# Patient Record
Sex: FEMALE | Race: WHITE | NOT HISPANIC OR LATINO | Employment: FULL TIME | URBAN - METROPOLITAN AREA
[De-identification: names, ages, dates, MRNs, and addresses within clinical notes are randomized per-mention and may not be internally consistent; named-entity substitution may affect disease eponyms.]

---

## 2017-03-10 DIAGNOSIS — N92.6 IRREGULAR MENSES: ICD-10-CM

## 2017-03-10 RX ORDER — DESOGESTREL AND ETHINYL ESTRADIOL 0.15-0.03
KIT ORAL
Qty: 28 TABLET | Refills: 3 | Status: SHIPPED | OUTPATIENT
Start: 2017-03-10 | End: 2018-01-26

## 2017-03-28 ENCOUNTER — PATIENT MESSAGE (OUTPATIENT)
Dept: INTERNAL MEDICINE | Facility: CLINIC | Age: 27
End: 2017-03-28

## 2017-03-28 ENCOUNTER — OFFICE VISIT (OUTPATIENT)
Dept: INTERNAL MEDICINE | Facility: CLINIC | Age: 27
End: 2017-03-28
Payer: COMMERCIAL

## 2017-03-28 VITALS
HEIGHT: 64 IN | DIASTOLIC BLOOD PRESSURE: 74 MMHG | SYSTOLIC BLOOD PRESSURE: 126 MMHG | WEIGHT: 143.94 LBS | BODY MASS INDEX: 24.57 KG/M2

## 2017-03-28 DIAGNOSIS — R30.0 DYSURIA: ICD-10-CM

## 2017-03-28 DIAGNOSIS — N30.00 ACUTE CYSTITIS WITHOUT HEMATURIA: Primary | ICD-10-CM

## 2017-03-28 LAB
BACTERIA #/AREA URNS AUTO: NORMAL /HPF
BILIRUB UR QL STRIP: NEGATIVE
CAOX CRY UR QL COMP ASSIST: NORMAL
CLARITY UR REFRACT.AUTO: ABNORMAL
COLOR UR AUTO: ABNORMAL
GLUCOSE UR QL STRIP: NEGATIVE
HGB UR QL STRIP: NEGATIVE
KETONES UR QL STRIP: NEGATIVE
LEUKOCYTE ESTERASE UR QL STRIP: ABNORMAL
MICROSCOPIC COMMENT: NORMAL
NITRITE UR QL STRIP: POSITIVE
PH UR STRIP: 5 [PH] (ref 5–8)
PROT UR QL STRIP: NEGATIVE
RBC #/AREA URNS AUTO: 0 /HPF (ref 0–4)
SP GR UR STRIP: >1.03 (ref 1–1.03)
SQUAMOUS #/AREA URNS AUTO: 1 /HPF
URN SPEC COLLECT METH UR: ABNORMAL
UROBILINOGEN UR STRIP-ACNC: ABNORMAL EU/DL
WBC #/AREA URNS AUTO: 1 /HPF (ref 0–5)

## 2017-03-28 PROCEDURE — 99213 OFFICE O/P EST LOW 20 MIN: CPT | Mod: S$GLB,,, | Performed by: STUDENT IN AN ORGANIZED HEALTH CARE EDUCATION/TRAINING PROGRAM

## 2017-03-28 PROCEDURE — 87086 URINE CULTURE/COLONY COUNT: CPT

## 2017-03-28 PROCEDURE — 87077 CULTURE AEROBIC IDENTIFY: CPT

## 2017-03-28 PROCEDURE — 87186 SC STD MICRODIL/AGAR DIL: CPT

## 2017-03-28 PROCEDURE — 87088 URINE BACTERIA CULTURE: CPT

## 2017-03-28 PROCEDURE — 99999 PR PBB SHADOW E&M-EST. PATIENT-LVL III: CPT | Mod: PBBFAC,,, | Performed by: STUDENT IN AN ORGANIZED HEALTH CARE EDUCATION/TRAINING PROGRAM

## 2017-03-28 PROCEDURE — 1160F RVW MEDS BY RX/DR IN RCRD: CPT | Mod: S$GLB,,, | Performed by: STUDENT IN AN ORGANIZED HEALTH CARE EDUCATION/TRAINING PROGRAM

## 2017-03-28 PROCEDURE — 81001 URINALYSIS AUTO W/SCOPE: CPT

## 2017-03-28 RX ORDER — NITROFURANTOIN (MACROCRYSTALS) 100 MG/1
100 CAPSULE ORAL EVERY 12 HOURS
Qty: 10 CAPSULE | Refills: 0 | Status: SHIPPED | OUTPATIENT
Start: 2017-03-28 | End: 2017-03-29 | Stop reason: ALTCHOICE

## 2017-03-28 NOTE — PROGRESS NOTES
Subjective:       Patient ID: Chelsie Almonte is a 27 y.o. female.    Chief Complaint: Urinary Tract Infection (x1 day)    Urinary Tract Infection    Associated symptoms include frequency. Pertinent negatives include no flank pain, hematuria, nausea or rash.      Patient comes to clinic for evaluation of possible UTI. She has been experiencing urinary discomfort, along with increased frequency for the past day. She states that this is her third UTI this year, and symptoms are similar to previous episodes. The first UTI she took Cipro, second UTI she took Bactrim which she states did not work well as she still had residual symptoms. None of the previous UTI's are documented in our chart as they were done elsewhere, no previous urine cultures on file. She has been taking Azo since yesterday. She denies any fevers, chills, or hematuria. She believes that recurrent UTI's are secondary to lack of urination following sexual intercourse and dehydration.     Review of Systems   Constitutional: Negative for activity change, appetite change, fatigue, fever and unexpected weight change.   HENT: Negative for congestion, hearing loss, rhinorrhea and sore throat.    Eyes: Negative for discharge and visual disturbance.   Respiratory: Negative for apnea, cough, choking and shortness of breath.    Cardiovascular: Negative for chest pain, palpitations and leg swelling.   Gastrointestinal: Negative for abdominal distention, abdominal pain, blood in stool, diarrhea and nausea.   Endocrine: Negative for cold intolerance, heat intolerance, polydipsia, polyphagia and polyuria.   Genitourinary: Positive for dysuria and frequency. Negative for difficulty urinating, flank pain and hematuria.   Musculoskeletal: Negative for arthralgias, back pain, joint swelling and myalgias.   Skin: Negative for color change, pallor and rash.   Allergic/Immunologic: Negative for environmental allergies and food allergies.   Neurological: Negative for  dizziness and headaches.   Hematological: Negative for adenopathy. Does not bruise/bleed easily.   Psychiatric/Behavioral: Negative for behavioral problems, decreased concentration, dysphoric mood and sleep disturbance. The patient is not nervous/anxious.        Objective:      Physical Exam   Constitutional: She is oriented to person, place, and time. She appears well-developed and well-nourished. No distress.   HENT:   Head: Normocephalic and atraumatic.   Right Ear: External ear normal.   Left Ear: External ear normal.   Mouth/Throat: No oropharyngeal exudate.   Eyes: Conjunctivae and EOM are normal. Pupils are equal, round, and reactive to light. Right eye exhibits no discharge. Left eye exhibits no discharge. No scleral icterus.   Neck: Normal range of motion. Neck supple. No JVD present. No tracheal deviation present. No thyromegaly present.   Cardiovascular: Normal rate and regular rhythm.  Exam reveals no gallop and no friction rub.    No murmur heard.  Pulmonary/Chest: Effort normal and breath sounds normal. No respiratory distress. She has no wheezes. She has no rales.   Abdominal: Soft. Bowel sounds are normal. She exhibits no distension and no mass. There is no tenderness. There is no rebound and no guarding.   Musculoskeletal: Normal range of motion. She exhibits no edema or tenderness.   Lymphadenopathy:     She has no cervical adenopathy.   Neurological: She is alert and oriented to person, place, and time.   Skin: Skin is warm and dry. No rash noted. She is not diaphoretic. No erythema. No pallor.   Psychiatric: She has a normal mood and affect. Her behavior is normal. Judgment and thought content normal.       Assessment:       1. Acute cystitis without hematuria    2. Dysuria        Plan:       Chelsie CORDOBA was seen today for urinary tract infection.    Diagnoses and all orders for this visit:    Dysuria  -     URINALYSIS  -     Urine culture  -     POCT urinalysis, dipstick  - Nitrofurantoin 500 BID  for 5 days, will follow up with patient regarding results of culture and appropriate abx

## 2017-03-28 NOTE — MR AVS SNAPSHOT
[facility]  [address]  [facility] [address]  Phone: [phone]  Fax: [phone]                  Chelsie Almonte   3/28/2017 3:30 PM   Office Visit    Description:  Female : 1990   Provider:  Philippe Peterson MD   Department:  [facility]           Reason for Visit     Urinary Tract Infection           Diagnoses this Visit        Comments    Dysuria    -  Primary            To Do List           Goals (5 Years of Data)     None      Follow-Up and Disposition     Return if symptoms worsen or fail to improve.    Follow-up and Disposition History       These Medications        Disp Refills Start End    nitrofurantoin (MACRODANTIN) 100 MG capsule 10 capsule 0 3/28/2017 2017    Take 1 capsule (100 mg total) by mouth every 12 (twelve) hours. - Oral    Pharmacy: \[facility]\"" [facility]  [facility] [address] #: [phone]         [facility] On Call     [facility] On Call Nurse Care Line -  Assistance  Registered nurses in the [facility] On Call Center provide clinical advisement, health education, appointment booking, and other advisory services.  Call for this free service at [phone].             Medications           Message regarding Medications     Verify the changes and/or additions to your medication regime listed below are the same as discussed with your clinician today.  If any of these changes or additions are incorrect, please notify your healthcare provider.        START taking these NEW medications        Refills    nitrofurantoin (MACRODANTIN) 100 MG capsule 0    Sig: Take 1 capsule (100 mg total) by mouth every 12 (twelve) hours.    Class: Normal    Route: Oral           Verify that the below list of medications is an accurate representation of the medications you are currently taking.  If none reported, the list may be blank. If incorrect, please contact your healthcare provider. Carry this list with you in case  "of emergency.           Current Medications     adapalene (DIFFERIN) 0.1 % gel     fluticasone (FLONASE) 50 mcg/actuation nasal spray 1 spray by Each Nare route once daily.    GILDESS 1-20 mg-mcg per tablet Take 1 tablet by mouth once daily.    minocycline 90 mg Tb24     nitrofurantoin (MACRODANTIN) 100 MG capsule Take 1 capsule (100 mg total) by mouth every 12 (twelve) hours.    RECLIPSEN, 28, 0.15-0.03 mg per tablet TAKE ONE TABLET BY MOUTH ONCE DAILY           Clinical Reference Information           Your Vitals Were     BP Height Weight Last Period BMI    126/74 (BP Location: Right arm, Patient Position: Sitting, BP Method: Manual) 5' 4" (1.626 m) 65.3 kg (143 lb 15.4 oz) 03/01/2017 24.71 kg/m2      Blood Pressure          Most Recent Value    BP  126/74      Allergies as of 3/28/2017     Penicillins      Immunizations Administered on Date of Encounter - 3/28/2017     None      Orders Placed During Today's Visit      Normal Orders This Visit    POCT urinalysis, dipstick or tablet reag     URINALYSIS     Urine culture       Language Assistance Services     ATTENTION: Language assistance services are available, free of charge. Please call [phone].      ATENCIÓN: Si habla oxana, tiene a wright disposición servicios gratuitos de asistencia lingüística. Llame al [phone].     MARLENY Ý: N?u b?n nói Ti?ng Vi?t, có các d?ch v? h? tr? ngôn ng? mi?n phí dành cho b?n. G?i s? [phone].         [facility] complies with applicable Federal civil rights laws and does not discriminate on the basis of race, color, national origin, age, disability, or sex.        "

## 2017-03-29 RX ORDER — NITROFURANTOIN 25; 75 MG/1; MG/1
100 CAPSULE ORAL 2 TIMES DAILY
Qty: 10 CAPSULE | Refills: 0 | Status: SHIPPED | OUTPATIENT
Start: 2017-03-29 | End: 2017-04-03

## 2017-03-29 NOTE — PROGRESS NOTES
Preceptor note    Patient's history and physical discussed, please refer to resident physician's note for specific details. Pt seen with resident physician. Medical record reviewed. UA positive. I agree with resident's assessment and plan.

## 2017-03-31 LAB — BACTERIA UR CULT: NORMAL

## 2017-05-26 ENCOUNTER — OFFICE VISIT (OUTPATIENT)
Dept: OBSTETRICS AND GYNECOLOGY | Facility: CLINIC | Age: 27
End: 2017-05-26
Payer: COMMERCIAL

## 2017-05-26 VITALS
BODY MASS INDEX: 24.46 KG/M2 | DIASTOLIC BLOOD PRESSURE: 74 MMHG | SYSTOLIC BLOOD PRESSURE: 108 MMHG | WEIGHT: 143.31 LBS | HEIGHT: 64 IN

## 2017-05-26 DIAGNOSIS — Z01.419 ENCOUNTER FOR GYNECOLOGICAL EXAMINATION WITHOUT ABNORMAL FINDING: Primary | ICD-10-CM

## 2017-05-26 DIAGNOSIS — N92.6 IRREGULAR MENSES: ICD-10-CM

## 2017-05-26 LAB
C TRACH DNA SPEC QL NAA+PROBE: NOT DETECTED
N GONORRHOEA DNA SPEC QL NAA+PROBE: NOT DETECTED

## 2017-05-26 PROCEDURE — 99999 PR PBB SHADOW E&M-EST. PATIENT-LVL III: CPT | Mod: PBBFAC,,, | Performed by: OBSTETRICS & GYNECOLOGY

## 2017-05-26 PROCEDURE — 99395 PREV VISIT EST AGE 18-39: CPT | Mod: S$GLB,,, | Performed by: OBSTETRICS & GYNECOLOGY

## 2017-05-26 PROCEDURE — 87591 N.GONORRHOEAE DNA AMP PROB: CPT

## 2017-05-26 RX ORDER — DESOGESTREL AND ETHINYL ESTRADIOL 0.15-0.03
1 KIT ORAL DAILY
Qty: 28 TABLET | Refills: 11 | Status: SHIPPED | OUTPATIENT
Start: 2017-05-26 | End: 2018-02-19 | Stop reason: SDUPTHER

## 2017-05-26 NOTE — PROGRESS NOTES
"CC: Well woman exam    Chelsie Almonte is a 27 y.o. female  presents for a well woman exam.  She has no issues, problems, or complaints.  Wants refill on OCPs  Agrees to STD testing    Past Medical History:   Diagnosis Date    Abnormal Pap smear of cervix     Heart murmur     evaluated as a child, told not to be a problem    Scoliosis     didn' t need brace       Past Surgical History:   Procedure Laterality Date    breast augmentation      NOSE SURGERY      correct fracture of nose    TONSILLECTOMY, ADENOIDECTOMY         OB History    Para Term  AB Living   0        SAB TAB Ectopic Multiple Live Births                    Family History   Problem Relation Age of Onset    Breast cancer Neg Hx     Colon cancer Neg Hx     Ovarian cancer Neg Hx        Social History   Substance Use Topics    Smoking status: Never Smoker    Smokeless tobacco: Never Used    Alcohol use 0.0 oz/week      Comment: once a week       /74   Ht 5' 4" (1.626 m)   Wt 65 kg (143 lb 4.8 oz)   LMP 2017 (Exact Date)   BMI 24.60 kg/m²     ROS:  GENERAL: Denies weight gain or weight loss. Feeling well overall.   SKIN: Denies rash or lesions.   HEAD: Denies head injury or headache.   NODES: Denies enlarged lymph nodes.   CHEST: Denies chest pain or shortness of breath.   CARDIOVASCULAR: Denies palpitations or left sided chest pain.   ABDOMEN: No abdominal pain, constipation, diarrhea, nausea, vomiting or rectal bleeding.   URINARY: No frequency, dysuria, hematuria, or burning on urination.  REPRODUCTIVE: See HPI.   BREASTS: The patient performs breast self-examination and denies pain, lumps, or nipple discharge.   HEMATOLOGIC: No easy bruisability or excessive bleeding.  MUSCULOSKELETAL: Denies joint pain or swelling.   NEUROLOGIC: Denies syncope or weakness.   PSYCHIATRIC: Denies depression, anxiety or mood swings.    Physical Exam:    APPEARANCE: Well nourished, well developed, in no acute " distress.  AFFECT: WNL, alert and oriented x 3  SKIN: No acne or hirsutism  NECK: Neck symmetric without masses or thyromegaly  NODES: No inguinal, cervical, axillary, or femoral lymph node enlargement  CHEST: Good respiratory effect  ABDOMEN: Soft.  No tenderness or masses.  No hepatosplenomegaly.  No hernias.  BREASTS: Symmetrical, no skin changes or visible lesions.  No palpable masses, nipple discharge bilaterally.  PELVIC: Normal external genitalia without lesions.  Normal hair distribution.  Adequate perineal body, normal urethral meatus.  Vagina moist and well rugated without lesions or discharge.  Cervix pink, without lesions, discharge or tenderness.  No significant cystocele or rectocele.  Bimanual exam shows uterus to be normal size, regular, mobile and nontender.  Adnexa without masses or tenderness.    EXTREMITIES: No edema.    ASSESSMENT AND PLAN  1. Encounter for gynecological examination without abnormal finding  desogestrel-ethinyl estradiol (APRI) 0.15-0.03 mg per tablet   2. Irregular menses  desogestrel-ethinyl estradiol (APRI) 0.15-0.03 mg per tablet    C. trachomatis/N. gonorrhoeae by AMP DNA Cervix       Patient was counseled today on A.C.S. Pap guidelines and recommendations for yearly pelvic exams, mammograms and monthly self breast exams; to see her PCP for other health maintenance.     F/U PRN

## 2017-06-02 ENCOUNTER — TELEPHONE (OUTPATIENT)
Dept: INTERNAL MEDICINE | Facility: CLINIC | Age: 27
End: 2017-06-02

## 2017-06-02 ENCOUNTER — PATIENT MESSAGE (OUTPATIENT)
Dept: INTERNAL MEDICINE | Facility: CLINIC | Age: 27
End: 2017-06-02

## 2017-06-02 NOTE — TELEPHONE ENCOUNTER
----- Message from Hortensia Hodgson sent at 6/2/2017 12:00 PM CDT -----  Contact: Self/[phone]  Pt said that she is calling in regards to needing to schedule a Tdap injection. Please call and advise            Thank you

## 2017-11-15 ENCOUNTER — OFFICE VISIT (OUTPATIENT)
Dept: URGENT CARE | Facility: CLINIC | Age: 27
End: 2017-11-15
Payer: COMMERCIAL

## 2017-11-15 VITALS
HEART RATE: 103 BPM | BODY MASS INDEX: 23.9 KG/M2 | OXYGEN SATURATION: 98 % | TEMPERATURE: 100 F | DIASTOLIC BLOOD PRESSURE: 85 MMHG | SYSTOLIC BLOOD PRESSURE: 147 MMHG | HEIGHT: 64 IN | WEIGHT: 140 LBS

## 2017-11-15 DIAGNOSIS — J06.9 UPPER RESPIRATORY TRACT INFECTION, UNSPECIFIED TYPE: Primary | ICD-10-CM

## 2017-11-15 DIAGNOSIS — R52 BODY ACHES: ICD-10-CM

## 2017-11-15 PROCEDURE — 99213 OFFICE O/P EST LOW 20 MIN: CPT | Mod: 25,S$GLB,, | Performed by: FAMILY MEDICINE

## 2017-11-15 PROCEDURE — 96372 THER/PROPH/DIAG INJ SC/IM: CPT | Mod: S$GLB,,, | Performed by: FAMILY MEDICINE

## 2017-11-15 RX ORDER — DEXAMETHASONE SODIUM PHOSPHATE 100 MG/10ML
5 INJECTION INTRAMUSCULAR; INTRAVENOUS ONCE
Status: COMPLETED | OUTPATIENT
Start: 2017-11-15 | End: 2017-11-15

## 2017-11-15 RX ORDER — BENZONATATE 200 MG/1
200 CAPSULE ORAL 3 TIMES DAILY PRN
Qty: 30 CAPSULE | Refills: 0 | Status: SHIPPED | OUTPATIENT
Start: 2017-11-15 | End: 2017-11-25

## 2017-11-15 RX ORDER — IBUPROFEN 200 MG
800 TABLET ORAL
Status: COMPLETED | OUTPATIENT
Start: 2017-11-15 | End: 2017-11-15

## 2017-11-15 RX ADMIN — Medication 800 MG: at 06:11

## 2017-11-15 RX ADMIN — DEXAMETHASONE SODIUM PHOSPHATE 5 MG: 100 INJECTION INTRAMUSCULAR; INTRAVENOUS at 06:11

## 2017-11-15 NOTE — PROGRESS NOTES
"Subjective:       Patient ID: Chelsie Almonte is a 27 y.o. female.    Vitals:  height is 5' 4" (1.626 m) and weight is 63.5 kg (140 lb). Her temperature is 99.5 °F (37.5 °C). Her blood pressure is 147/85 (abnormal) and her pulse is 103. Her oxygen saturation is 98%.     Chief Complaint: URI    Throat pain and cough      URI    This is a new problem. The current episode started yesterday. The problem has been gradually worsening. Associated symptoms include congestion, coughing, headaches and a sore throat. Pertinent negatives include no abdominal pain, chest pain, ear pain, nausea or wheezing. She has tried acetaminophen (tylenol cold, dayquil, flonase) for the symptoms. The treatment provided no relief.     Review of Systems   Constitution: Positive for chills, fever and malaise/fatigue.   HENT: Positive for congestion, hoarse voice and sore throat. Negative for ear pain.    Eyes: Negative for discharge and redness.   Cardiovascular: Negative for chest pain, dyspnea on exertion and leg swelling.   Respiratory: Positive for cough. Negative for shortness of breath, sputum production and wheezing.    Musculoskeletal: Negative for myalgias.   Gastrointestinal: Negative for abdominal pain and nausea.   Neurological: Positive for headaches.   All other systems reviewed and are negative.      Objective:      Physical Exam   Constitutional: She is oriented to person, place, and time. She appears well-developed and well-nourished.  Non-toxic appearance. She appears ill.   HENT:   Head: Normocephalic and atraumatic.   Right Ear: External ear normal.   Left Ear: External ear normal.   Nose: Mucosal edema, rhinorrhea and sinus tenderness present.   Mouth/Throat: Posterior oropharyngeal erythema present.   Eyes: Conjunctivae and EOM are normal. Pupils are equal, round, and reactive to light.   Neck: Normal range of motion.   Cardiovascular: Normal rate, regular rhythm and normal heart sounds.    Pulmonary/Chest: Effort " normal and breath sounds normal. She has no wheezes. She has no rales.   Abdominal: Soft.   Neurological: She is alert and oriented to person, place, and time.       Assessment:       1. Upper respiratory tract infection, unspecified type    2. Body aches        Plan:         Upper respiratory tract infection, unspecified type  -     dexamethasone injection 5 mg; Inject 0.5 mLs (5 mg total) into the muscle once.    Body aches  -     POCT Influenza A/B  -     ibuprofen tablet 800 mg; Take 4 tablets (800 mg total) by mouth one time.    Other orders  -     benzonatate (TESSALON) 200 MG capsule; Take 1 capsule (200 mg total) by mouth 3 (three) times daily as needed for Cough.  Dispense: 30 capsule; Refill: 0      Patient Instructions     Viral Upper Respiratory Illness (Adult)  You have a viral upper respiratory illness (URI), which is another term for the common cold. This illness is contagious during the first few days. It is spread through the air by coughing and sneezing. It may also be spread by direct contact (touching the sick person and then touching your own eyes, nose, or mouth). Frequent handwashing will decrease risk of spread. Most viral illnesses go away within 7 to 10 days with rest and simple home remedies. Sometimes the illness may last for several weeks. Antibiotics will not kill a virus, and they are generally not prescribed for this condition.    Home care  · If symptoms are severe, rest at home for the first 2 to 3 days. When you resume activity, don't let yourself get too tired.  · Avoid being exposed to cigarette smoke (yours or others).  · You may use acetaminophen or ibuprofen to control pain and fever, unless another medicine was prescribed. (Note: If you have chronic liver or kidney disease, have ever had a stomach ulcer or gastrointestinal bleeding, or are taking blood-thinning medicines, talk with your healthcare provider before using these medicines.) Aspirin should never be given to  anyone under 18 years of age who is ill with a viral infection or fever. It may cause severe liver or brain damage.  · Your appetite may be poor, so a light diet is fine. Avoid dehydration by drinking 6 to 8 glasses of fluids per day (water, soft drinks, juices, tea, or soup). Extra fluids will help loosen secretions in the nose and lungs.  · Over-the-counter cold medicines will not shorten the length of time youre sick, but they may be helpful for the following symptoms: cough, sore throat, and nasal and sinus congestion. (Note: Do not use decongestants if you have high blood pressure.)  Follow-up care  Follow up with your healthcare provider, or as advised.  When to seek medical advice  Call your healthcare provider right away if any of these occur:  · Cough with lots of colored sputum (mucus)  · Severe headache; face, neck, or ear pain  · Difficulty swallowing due to throat pain  · Fever of 100.4°F (38°C)  Call 911, or get immediate medical care  Call emergency services right away if any of these occur:  · Chest pain, shortness of breath, wheezing, or difficulty breathing  · Coughing up blood  · Inability to swallow due to throat pain  Date Last Reviewed: 9/13/2015  © 5131-0026 [employer]. [address] [place] [address], [place] [address]. All rights reserved. This information is not intended as a substitute for professional medical care. Always follow your healthcare professional's instructions.

## 2017-11-15 NOTE — LETTER
November 15, 2017      [facility] [address] [facility] [facility] [place] [address]  Phone: [phone]  Fax: [phone]       Patient: Chelsie Almonte   YOB: 1990  Date of Visit: 11/15/2017    To Whom It May Concern:    Herbert Almonte  was at [facility] on 11/15/2017. She may return to work/school on 11/17/2017 with no restrictions. If you have any questions or concerns, or if I can be of further assistance, please do not hesitate to contact me.    Sincerely,        Eliseo Maradiaga MD

## 2017-11-16 NOTE — PATIENT INSTRUCTIONS
Viral Upper Respiratory Illness (Adult)  You have a viral upper respiratory illness (URI), which is another term for the common cold. This illness is contagious during the first few days. It is spread through the air by coughing and sneezing. It may also be spread by direct contact (touching the sick person and then touching your own eyes, nose, or mouth). Frequent handwashing will decrease risk of spread. Most viral illnesses go away within 7 to 10 days with rest and simple home remedies. Sometimes the illness may last for several weeks. Antibiotics will not kill a virus, and they are generally not prescribed for this condition.    Home care  · If symptoms are severe, rest at home for the first 2 to 3 days. When you resume activity, don't let yourself get too tired.  · Avoid being exposed to cigarette smoke (yours or others).  · You may use acetaminophen or ibuprofen to control pain and fever, unless another medicine was prescribed. (Note: If you have chronic liver or kidney disease, have ever had a stomach ulcer or gastrointestinal bleeding, or are taking blood-thinning medicines, talk with your healthcare provider before using these medicines.) Aspirin should never be given to anyone under 18 years of age who is ill with a viral infection or fever. It may cause severe liver or brain damage.  · Your appetite may be poor, so a light diet is fine. Avoid dehydration by drinking 6 to 8 glasses of fluids per day (water, soft drinks, juices, tea, or soup). Extra fluids will help loosen secretions in the nose and lungs.  · Over-the-counter cold medicines will not shorten the length of time youre sick, but they may be helpful for the following symptoms: cough, sore throat, and nasal and sinus congestion. (Note: Do not use decongestants if you have high blood pressure.)  Follow-up care  Follow up with your healthcare provider, or as advised.  When to seek medical advice  Call your healthcare provider right away if any  of these occur:  · Cough with lots of colored sputum (mucus)  · Severe headache; face, neck, or ear pain  · Difficulty swallowing due to throat pain  · Fever of 100.4°F (38°C)  Call 911, or get immediate medical care  Call emergency services right away if any of these occur:  · Chest pain, shortness of breath, wheezing, or difficulty breathing  · Coughing up blood  · Inability to swallow due to throat pain  Date Last Reviewed: 9/13/2015  © 0748-9860 [employer]. [address] [place] [address]. All rights reserved. This information is not intended as a substitute for professional medical care. Always follow your healthcare professional's instructions.

## 2017-11-20 ENCOUNTER — OFFICE VISIT (OUTPATIENT)
Dept: URGENT CARE | Facility: CLINIC | Age: 27
End: 2017-11-20
Payer: COMMERCIAL

## 2017-11-20 VITALS
SYSTOLIC BLOOD PRESSURE: 143 MMHG | TEMPERATURE: 98 F | DIASTOLIC BLOOD PRESSURE: 83 MMHG | OXYGEN SATURATION: 98 % | HEART RATE: 104 BPM | WEIGHT: 140 LBS | HEIGHT: 64 IN | RESPIRATION RATE: 19 BRPM | BODY MASS INDEX: 23.9 KG/M2

## 2017-11-20 DIAGNOSIS — H66.002 ACUTE SUPPURATIVE OTITIS MEDIA OF LEFT EAR WITHOUT SPONTANEOUS RUPTURE OF TYMPANIC MEMBRANE, RECURRENCE NOT SPECIFIED: ICD-10-CM

## 2017-11-20 DIAGNOSIS — J02.9 PHARYNGITIS, UNSPECIFIED ETIOLOGY: Primary | ICD-10-CM

## 2017-11-20 PROCEDURE — 99213 OFFICE O/P EST LOW 20 MIN: CPT | Mod: S$GLB,,, | Performed by: NURSE PRACTITIONER

## 2017-11-20 RX ORDER — CODEINE PHOSPHATE AND GUAIFENESIN 10; 100 MG/5ML; MG/5ML
5 SOLUTION ORAL 3 TIMES DAILY PRN
Qty: 118 ML | Refills: 0 | Status: SHIPPED | OUTPATIENT
Start: 2017-11-20 | End: 2017-11-27

## 2017-11-20 RX ORDER — DOXYCYCLINE 100 MG/1
100 CAPSULE ORAL 2 TIMES DAILY
Qty: 14 CAPSULE | Refills: 0 | Status: SHIPPED | OUTPATIENT
Start: 2017-11-20 | End: 2017-11-27

## 2017-11-20 RX ORDER — LIDOCAINE HYDROCHLORIDE 20 MG/ML
SOLUTION OROPHARYNGEAL
Qty: 100 ML | Refills: 0 | Status: SHIPPED | OUTPATIENT
Start: 2017-11-20 | End: 2017-11-23

## 2017-11-20 NOTE — PATIENT INSTRUCTIONS
Please drink plenty of fluids.  Please get plenty of rest.    Please return here or go to the Emergency Department for any concerns or worsening of condition.    If you were prescribed antibiotics, please take them to completion.    If you were prescribed a narcotic medication cough medication, do not drive or operate heavy equipment or machinery while taking these medications.    If not allergic, please take over the counter Tylenol (Acetaminophen) and/or Motrin (Ibuprofen) as directed on bottle for control of pain and/or fever.    Please follow up with your primary care doctor or specialist as needed.    If you  smoke, please stop smoking.    When You Have a Sore Throat    A sore throat can be painful. There are many reasons why you may have a sore throat. Your healthcare provider will work with you to find the cause of your sore throat. He or she will also find the best treatment for you.  What causes a sore throat?  Sore throats can be caused or worsened by:  · Cold or flu viruses  · Bacteria  · Irritants such as tobacco smoke or air pollution  · Acid reflux  A healthy throat  The tonsils are on the sides of the throat near the base of the tongue. They collect viruses and bacteria and help fight infection. The throat (pharynx) is the passage for air. Mucus from the nasal cavity also moves down the passage.  An inflamed throat  The tonsils and pharynx can become inflamed due to a cold or flu virus. Postnasal drip (excess mucus draining from the nasal cavity) can irritate the throat. It can also make the throat or tonsils more likely to be infected by bacteria. Severe, untreated tonsillitis in children or adults can cause a pocket of pus (abscess) to form near the tonsil.  Your evaluation  A medical evaluation can help find the cause of your sore throat. It can also help your healthcare provider choose the best treatment for you. The evaluation may include a health history, physical exam, and diagnostic  tests.  Health history  Your healthcare provider may ask you the following:  · How long has the sore throat lasted and how have you been treating it?  · Do you have any other symptoms, such as body aches, fever, or cough?  · Does your sore throat recur? If so, how often? How many days of school or work have you missed because of a sore throat?  · Do you have trouble eating or swallowing?  · Have you been told that you snore or have other sleep problems?  · Do you have bad breath?  · Do you cough up bad-tasting mucus?  Physical exam  During the exam, your healthcare provider checks your ears, nose, and throat for problems. He or she also checks for swelling in the neck, and may listen to your chest.  Possible tests  Other tests your healthcare provider may perform include:  · A throat swab to check for bacteria such as streptococcus (the bacteria that causes strep throat)  · A blood test to check for mononucleosis (a viral infection)  · A chest X-ray to rule out pneumonia, especially if you have a cough  Treating a sore throat  Treatment depends on many factors. What is the likely cause? Is the problem recent? Does it keep coming back? In many cases, the best thing to do is to treat the symptoms, rest, and let the problem heal itself. Antibiotics may help clear up some bacterial infections. For cases of severe or recurring tonsillitis, the tonsils may need to be removed.  Relieving your symptoms  · Dont smoke, and avoid secondhand smoke.  · For children, try throat sprays or Popsicles. Adults and older children may try lozenges.  · Drink warm liquids to soothe the throat and help thin mucus. Avoid alcohol, spicy foods, and acidic drinks such as orange juice. These can irritate the throat.  · Gargle with warm saltwater (1 teaspoon of salt to 8 ounces of warm water).  · Use a humidifier to keep air moist and relieve throat dryness.  · Try over-the-counter pain relievers such as acetaminophen or ibuprofen. Use as  "directed, and dont exceed the recommended dose. Dont give aspirin to children.   Are antibiotics needed?  If your sore throat is due to a bacterial infection, antibiotics may speed healing and prevent complications. Although group A streptococcus ("strep throat" or GAS) is the major treatable infection for a sore throat, GAS causes only 5% to 15% of sore throats in adults who seek medical care. Most sore throats are caused by cold or flu viruses. And antibiotics dont treat viral illness. In fact, using antibiotics when theyre not needed may produce bacteria that are harder to kill. Your healthcare provider will prescribe antibiotics only if he or she thinks they are likely to help.  If antibiotics are prescribed  Take the medicine exactly as directed. Be sure to finish your prescription even if youre feeling better. And be sure to ask your healthcare provider or pharmacist what side effects are common and what to do about them.  Is surgery needed?  In some cases, tonsils need to be removed. This is often done as outpatient (same-day) surgery. Your healthcare provider may advise removing the tonsils in cases of:  · Several severe bouts of tonsillitis in a year. Severe episodes include those that lead to missed days of school or work, or that need to be treated with antibiotics.  · Tonsillitis that causes breathing problems during sleep  · Tonsillitis caused by food particles collecting in pouches in the tonsils (cryptic tonsillitis)  Call your healthcare provider if any of the following occur:  · Symptoms worsen, or new symptoms develop.  · Swollen tonsils make breathing difficult.  · The pain is severe enough to keep you from drinking liquids.  · A skin rash, hives, or wheezing develops. Any of these could signal an allergic reaction to antibiotics.  · Symptoms dont improve within a week.  · Symptoms dont improve within 2 to 3 days of starting antibiotics.   Date Last Reviewed: 10/1/2016  © 0916-6154 [employer] " [employer]. [address] [place] [address], [place] [address]. All rights reserved. This information is not intended as a substitute for professional medical care. Always follow your healthcare professional's instructions.        Self-Care for Sore Throats    Sore throats happen for many reasons, such as colds, allergies, and infections caused by viruses or bacteria. In any case, your throat becomes red and sore. Your goal for self-care is to reduce your discomfort while giving your throat a chance to heal.  Moisten and soothe your throat  Tips include the following:  · Try a sip of water first thing after waking up.  · Keep your throat moist by drinking 6 or more glasses of clear liquids every day.  · Run a cool-air humidifier in your room overnight.  · Avoid cigarette smoke.   · Suck on throat lozenges, cough drops, hard candy, ice chips, or frozen fruit-juice bars. Use the sugar-free versions if your diet or medical condition requires them.  Gargle to ease irritation  Gargling every hour or 2 can ease irritation. Try gargling with 1 of these solutions:  · 1/4 teaspoon of salt in 1/2 cup of warm water  · An over-the-counter anesthetic gargle  Use medicine for more relief  Over-the-counter medicine can reduce sore throat symptoms. Ask your pharmacist if you have questions about which medicine to use:  · Ease pain with anesthetic sprays. Aspirin or an aspirin substitute also helps. Remember, never give aspirin to anyone 18 or younger, or if you are already taking blood thinners.   · For sore throats caused by allergies, try antihistamines to block the allergic reaction.  · Remember: unless a sore throat is caused by a bacterial infection, antibiotics wont help you.  Prevent future sore throats  Prevention tips include the following:  · Stop smoking or reduce contact with secondhand smoke. Smoke irritates the tender throat lining.  · Limit contact with pets and with allergy-causing substances, such as pollen and  mold.  · When youre around someone with a sore throat or cold, wash your hands often to keep viruses or bacteria from spreading.  · Dont strain your vocal cords.  Call your healthcare provider  Contact your healthcare provider if you have:  · A temperature over 101°F (38.3°C)  · White spots on the throat  · Great difficulty swallowing  · Trouble breathing  · A skin rash  · Recent exposure to someone else with strep bacteria  · Severe hoarseness and swollen glands in the neck or jaw   Date Last Reviewed: 8/1/2016 © 2000-2017 [employer]. [address] [place] [id number]. All rights reserved. This information is not intended as a substitute for professional medical care. Always follow your healthcare professional's instructions.

## 2017-11-20 NOTE — PROGRESS NOTES
"Subjective:       Patient ID: Chelsie Almonte is a 27 y.o. female.    Vitals:  height is 5' 4" (1.626 m) and weight is 63.5 kg (140 lb). Her temperature is 98.4 °F (36.9 °C). Her blood pressure is 143/83 (abnormal) and her pulse is 104. Her respiration is 19 and oxygen saturation is 98%.     Chief Complaint: Cough    Pt reports being sick for 1 week and getting worse today with fever of 100.6      Cough   This is a new problem. The current episode started in the past 7 days. The problem has been gradually worsening. The problem occurs every few minutes. The cough is productive of purulent sputum. Associated symptoms include chills, ear pain, a fever, headaches and a sore throat. Pertinent negatives include no chest pain, eye redness, myalgias, shortness of breath or wheezing. Nothing aggravates the symptoms. Treatments tried: delsym, motrin and nyquil. The treatment provided mild relief.     Review of Systems   Constitution: Positive for chills, fever and malaise/fatigue.   HENT: Positive for ear pain, hoarse voice and sore throat.    Eyes: Negative for discharge and redness.   Cardiovascular: Negative for chest pain, dyspnea on exertion and leg swelling.   Respiratory: Positive for cough. Negative for shortness of breath, sputum production and wheezing.    Musculoskeletal: Negative for myalgias.   Gastrointestinal: Negative for abdominal pain and nausea.   Neurological: Positive for headaches.       Objective:      Physical Exam   Constitutional: She is oriented to person, place, and time. She appears well-developed and well-nourished. She is cooperative.  Non-toxic appearance. She does not have a sickly appearance. She appears ill. No distress.   HENT:   Head: Normocephalic and atraumatic.   Right Ear: Hearing, tympanic membrane, external ear and ear canal normal.   Left Ear: Hearing, external ear and ear canal normal. Tympanic membrane is injected. A middle ear effusion is present.   Nose: Mucosal edema and " rhinorrhea present. Right sinus exhibits no maxillary sinus tenderness and no frontal sinus tenderness. Left sinus exhibits no maxillary sinus tenderness and no frontal sinus tenderness.   Mouth/Throat: Uvula is midline and mucous membranes are normal. Posterior oropharyngeal erythema present.   Eyes: Conjunctivae and lids are normal.   Neck: Normal range of motion and full passive range of motion without pain. Neck supple. No neck rigidity. No edema, no erythema and normal range of motion present.   Cardiovascular: Normal rate, regular rhythm and normal heart sounds.    Pulmonary/Chest: Effort normal and breath sounds normal. No accessory muscle usage. No apnea, no tachypnea and no bradypnea. No respiratory distress. She has no decreased breath sounds. She has no wheezes. She has no rhonchi. She has no rales.   Pt coughing throughout exam   Abdominal: Normal appearance.   Lymphadenopathy:        Head (right side): No submental, no submandibular, no tonsillar, no preauricular, no posterior auricular and no occipital adenopathy present.        Head (left side): No submental, no submandibular, no tonsillar, no preauricular, no posterior auricular and no occipital adenopathy present.     She has cervical adenopathy.        Right cervical: Superficial cervical adenopathy present.        Left cervical: Superficial cervical adenopathy present.   Neurological: She is alert and oriented to person, place, and time.   Psychiatric: She has a normal mood and affect. Her speech is normal and behavior is normal.   Nursing note and vitals reviewed.      Assessment:       1. Pharyngitis, unspecified etiology    2. Acute suppurative otitis media of left ear without spontaneous rupture of tympanic membrane, recurrence not specified        Plan:         Pharyngitis, unspecified etiology  -     guaifenesin-codeine 100-10 mg/5 ml (TUSSI-ORGANIDIN NR)  mg/5 mL syrup; Take 5 mLs by mouth 3 (three) times daily as needed for Cough.   Dispense: 118 mL; Refill: 0  -     doxycycline (VIBRAMYCIN) 100 MG Cap; Take 1 capsule (100 mg total) by mouth 2 (two) times daily.  Dispense: 14 capsule; Refill: 0  -     lidocaine HCl 2% (LIDOCAINE VISCOUS) 2 % Soln; by Mucous Membrane route every 3 (three) hours as needed. Gargle and spit every 3 hours as needed  Dispense: 100 mL; Refill: 0    Acute suppurative otitis media of left ear without spontaneous rupture of tympanic membrane, recurrence not specified  -     doxycycline (VIBRAMYCIN) 100 MG Cap; Take 1 capsule (100 mg total) by mouth 2 (two) times daily.  Dispense: 14 capsule; Refill: 0      Pt instructed to follow up with pcp for no improvement or go to ER for worsening of symptoms.

## 2017-11-24 ENCOUNTER — TELEPHONE (OUTPATIENT)
Dept: URGENT CARE | Facility: CLINIC | Age: 27
End: 2017-11-24

## 2017-11-25 ENCOUNTER — TELEPHONE (OUTPATIENT)
Dept: URGENT CARE | Facility: CLINIC | Age: 27
End: 2017-11-25

## 2017-12-26 ENCOUNTER — PATIENT MESSAGE (OUTPATIENT)
Dept: INTERNAL MEDICINE | Facility: CLINIC | Age: 27
End: 2017-12-26

## 2017-12-26 ENCOUNTER — HOSPITAL ENCOUNTER (OUTPATIENT)
Dept: RADIOLOGY | Facility: HOSPITAL | Age: 27
Discharge: HOME | End: 2017-12-26
Attending: NURSE PRACTITIONER
Payer: COMMERCIAL

## 2017-12-26 ENCOUNTER — OFFICE VISIT (OUTPATIENT)
Dept: INTERNAL MEDICINE | Facility: CLINIC | Age: 27
End: 2017-12-26
Payer: COMMERCIAL

## 2017-12-26 VITALS
OXYGEN SATURATION: 99 % | HEIGHT: 64 IN | SYSTOLIC BLOOD PRESSURE: 122 MMHG | BODY MASS INDEX: 24.05 KG/M2 | HEART RATE: 64 BPM | DIASTOLIC BLOOD PRESSURE: 70 MMHG | WEIGHT: 140.88 LBS

## 2017-12-26 DIAGNOSIS — M54.40 ACUTE RIGHT-SIDED LOW BACK PAIN WITH SCIATICA, SCIATICA LATERALITY UNSPECIFIED: ICD-10-CM

## 2017-12-26 DIAGNOSIS — M41.26 OTHER IDIOPATHIC SCOLIOSIS, LUMBAR REGION: ICD-10-CM

## 2017-12-26 DIAGNOSIS — M54.41 CHRONIC RIGHT-SIDED LOW BACK PAIN WITH RIGHT-SIDED SCIATICA: ICD-10-CM

## 2017-12-26 DIAGNOSIS — M41.9 SCOLIOSIS OF LUMBAR SPINE, UNSPECIFIED SCOLIOSIS TYPE: Primary | ICD-10-CM

## 2017-12-26 DIAGNOSIS — G89.29 CHRONIC RIGHT-SIDED LOW BACK PAIN WITH RIGHT-SIDED SCIATICA: ICD-10-CM

## 2017-12-26 DIAGNOSIS — G89.29 CHRONIC RIGHT-SIDED LOW BACK PAIN WITH RIGHT-SIDED SCIATICA: Primary | ICD-10-CM

## 2017-12-26 DIAGNOSIS — M54.41 CHRONIC RIGHT-SIDED LOW BACK PAIN WITH RIGHT-SIDED SCIATICA: Primary | ICD-10-CM

## 2017-12-26 DIAGNOSIS — M54.31 SCIATICA OF RIGHT SIDE: ICD-10-CM

## 2017-12-26 PROCEDURE — 72110 X-RAY EXAM L-2 SPINE 4/>VWS: CPT | Mod: TC

## 2017-12-26 PROCEDURE — 99999 PR PBB SHADOW E&M-EST. PATIENT-LVL IV: CPT | Mod: PBBFAC,,, | Performed by: NURSE PRACTITIONER

## 2017-12-26 PROCEDURE — 99213 OFFICE O/P EST LOW 20 MIN: CPT | Mod: S$GLB,,, | Performed by: NURSE PRACTITIONER

## 2017-12-26 PROCEDURE — 72110 X-RAY EXAM L-2 SPINE 4/>VWS: CPT | Mod: 26,,, | Performed by: RADIOLOGY

## 2017-12-26 RX ORDER — DICLOFENAC SODIUM 75 MG/1
75 TABLET, DELAYED RELEASE ORAL 2 TIMES DAILY
Qty: 30 TABLET | Refills: 0 | Status: SHIPPED | OUTPATIENT
Start: 2017-12-26 | End: 2018-01-26

## 2017-12-26 RX ORDER — METHOCARBAMOL 500 MG/1
500 TABLET, FILM COATED ORAL 3 TIMES DAILY
Qty: 30 TABLET | Refills: 0 | Status: SHIPPED | OUTPATIENT
Start: 2017-12-26 | End: 2017-12-27

## 2017-12-26 NOTE — PROGRESS NOTES
INTERNAL MEDICINE PROGRESS/URGENT CARE NOTE    CHIEF COMPLAINT     Chief Complaint   Patient presents with    Low-back Pain     4+ months (But has gotten worst in past few days) , Patient stated that she has shooting Lower Glute pain =5 , Right side       HPI     Chelsie Almonte is a 27 y.o. female who presents for an urgent visit today.    Lowe back pain-0 started 4 moths ago but recently pain localizes to the right glute and down the right leg. Pain is sharp and stabbing. Pain worse with bending over and walking. Relieving with heating pad and stretches.   Denies injury and trauma. Pt stands for long periods of time at work attributes pain to the this. wears flat shoes at work. 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    No numbness or tingling to the feet.     Past Medical History:  Past Medical History:   Diagnosis Date    Abnormal Pap smear of cervix     Heart murmur     evaluated as a child, told not to be a problem    Scoliosis     didn' t need brace       Home Medications:  Prior to Admission medications    Medication Sig Start Date End Date Taking? Authorizing Provider   desogestrel-ethinyl estradiol (APRI) 0.15-0.03 mg per tablet Take 1 tablet by mouth once daily. 5/26/17 5/26/18 Yes Hodan Markham MD   RECLIPSEN, 28, 0.15-0.03 mg per tablet TAKE ONE TABLET BY MOUTH ONCE DAILY 3/10/17   Hodan Markham MD       Review of Systems:  Review of Systems   Constitutional: Negative for chills, fatigue and fever.   Respiratory: Positive for cough. Negative for shortness of breath and wheezing.    Cardiovascular: Negative for chest pain and palpitations.   Gastrointestinal: Negative for abdominal pain, constipation, diarrhea, nausea and vomiting.   Genitourinary: Negative for difficulty urinating, flank pain, pelvic pain and urgency.   Musculoskeletal: Positive for back pain. Negative for gait problem, myalgias, neck pain and neck stiffness.   Skin: Negative for rash.   Neurological:  "Negative for dizziness, seizures, light-headedness, numbness and headaches.       Health Maintainence:   Immunizations:  Health Maintenance       Date Due Completion Date    HPV Vaccines (1 of 3 - Female 3 Dose Series) 02/11/2002 ---    TETANUS VACCINE 02/11/2009 ---    Pap Smear 04/04/2017 4/4/2016    Influenza Vaccine 08/01/2017 ---           PHYSICAL EXAM     /70 (BP Location: Left arm, Patient Position: Sitting, BP Method: Medium (Manual))   Pulse 64   Ht 5' 4" (1.626 m)   Wt 63.9 kg (140 lb 14 oz)   LMP 10/26/2017 (LMP Unknown)   SpO2 99%   BMI 24.18 kg/m²     Physical Exam   Constitutional: She is oriented to person, place, and time. She appears well-developed and well-nourished.   HENT:   Head: Normocephalic.   Right Ear: External ear normal.   Left Ear: External ear normal.   Nose: Nose normal.   Mouth/Throat: Oropharynx is clear and moist. No oropharyngeal exudate.   Eyes: Pupils are equal, round, and reactive to light.   Neck: Neck supple. No JVD present. No tracheal deviation present. No thyromegaly present.   Cardiovascular: Normal rate, regular rhythm, normal heart sounds and intact distal pulses.  Exam reveals no gallop and no friction rub.    No murmur heard.  Pulmonary/Chest: Effort normal and breath sounds normal. No respiratory distress. She has no wheezes. She has no rales.   Abdominal: Soft. Bowel sounds are normal. She exhibits no distension. There is no tenderness.   Musculoskeletal: Normal range of motion. She exhibits no edema.        Lumbar back: She exhibits tenderness, pain and spasm. She exhibits normal range of motion, no bony tenderness, no swelling, no edema, no deformity, no laceration and normal pulse.        Back:    Lymphadenopathy:     She has no cervical adenopathy.   Neurological: She is alert and oriented to person, place, and time.   Skin: Skin is warm and dry. No rash noted.   Psychiatric: She has a normal mood and affect. Her behavior is normal.   Vitals " reviewed.      LABS     No results found for: LABA1C, HGBA1C  CMP  Sodium   Date Value Ref Range Status   11/11/2015 137 136 - 145 mmol/L Final     Potassium   Date Value Ref Range Status   11/11/2015 4.1 3.5 - 5.1 mmol/L Final     Chloride   Date Value Ref Range Status   11/11/2015 106 95 - 110 mmol/L Final     CO2   Date Value Ref Range Status   11/11/2015 26 23 - 29 mmol/L Final     Glucose   Date Value Ref Range Status   11/11/2015 98 70 - 110 mg/dL Final     BUN, Bld   Date Value Ref Range Status   11/11/2015 11 6 - 20 mg/dL Final     Creatinine   Date Value Ref Range Status   11/11/2015 0.9 0.5 - 1.4 mg/dL Final     Calcium   Date Value Ref Range Status   11/11/2015 9.3 8.7 - 10.5 mg/dL Final     Total Protein   Date Value Ref Range Status   11/11/2015 6.9 6.0 - 8.4 g/dL Final     Albumin   Date Value Ref Range Status   11/11/2015 3.7 3.5 - 5.2 g/dL Final     Total Bilirubin   Date Value Ref Range Status   11/11/2015 0.5 0.1 - 1.0 mg/dL Final     Comment:     For infants and newborns, interpretation of results should be based  on gestational age, weight and in agreement with clinical  observations.  Premature Infant recommended reference ranges:  Up to 24 hours.............<8.0 mg/dL  Up to 48 hours............<12.0 mg/dL  3-5 days..................<15.0 mg/dL  6-29 days.................<15.0 mg/dL       Alkaline Phosphatase   Date Value Ref Range Status   11/11/2015 35 (L) 55 - 135 U/L Final     AST   Date Value Ref Range Status   11/11/2015 21 10 - 40 U/L Final     ALT   Date Value Ref Range Status   11/11/2015 18 10 - 44 U/L Final     Anion Gap   Date Value Ref Range Status   11/11/2015 5 (L) 8 - 16 mmol/L Final     eGFR if    Date Value Ref Range Status   11/11/2015 >60.0 >60 mL/min/1.73 m^2 Final     eGFR if non    Date Value Ref Range Status   11/11/2015 >60.0 >60 mL/min/1.73 m^2 Final     Comment:     Calculation used to obtain the estimated glomerular filtration  rate  (eGFR) is the CKD-EPI equation. Since race is unknown   in our information system, the eGFR values for   -American and Non--American patients are given   for each creatinine result.       Lab Results   Component Value Date    WBC 9.52 02/20/2016    HGB 13.8 02/20/2016    HCT 41.4 02/20/2016    MCV 84 02/20/2016     02/20/2016     Lab Results   Component Value Date    CHOL 174 11/11/2015     Lab Results   Component Value Date    HDL 60 11/11/2015     Lab Results   Component Value Date    LDLCALC 90.0 11/11/2015     Lab Results   Component Value Date    TRIG 120 11/11/2015     Lab Results   Component Value Date    CHOLHDL 34.5 11/11/2015     Lab Results   Component Value Date    TSH 2.938 11/11/2015       ASSESSMENT/PLAN     Chelsie Almonte is a 27 y.o. female with  Past Medical History:   Diagnosis Date    Abnormal Pap smear of cervix     Heart murmur     evaluated as a child, told not to be a problem    Scoliosis     didn' t need brace     Chronic right-sided low back pain with right-sided sciatica- will send for images. May use nsaids bid with food and methocarbamol as needed.   -     X-Ray Lumbar Spine Complete 5 View; Future; Expected date: 12/26/2017  -     diclofenac (VOLTAREN) 75 MG EC tablet; Take 1 tablet (75 mg total) by mouth 2 (two) times daily.  Dispense: 30 tablet; Refill: 0  -     methocarbamol (ROBAXIN) 500 MG Tab; Take 1 tablet (500 mg total) by mouth 3 (three) times daily.  Dispense: 30 tablet; Refill: 0    Sciatica of right side  -     diclofenac (VOLTAREN) 75 MG EC tablet; Take 1 tablet (75 mg total) by mouth 2 (two) times daily.  Dispense: 30 tablet; Refill: 0  -     methocarbamol (ROBAXIN) 500 MG Tab; Take 1 tablet (500 mg total) by mouth 3 (three) times daily.  Dispense: 30 tablet; Refill: 0    Other idiopathic scoliosis, lumbar region- xrays of the L-spine show lumbar scoliosis. Will refer to PT/OT.   -     Ambulatory Referral to Physical/Occupational  Therapy          Follow up  As needed     Patient education provided from Mon[facility]. Patient was counseled on when and how to seek emergent care.       Jane KNIGHT, KAYLA, FNP-c   Department of Internal Medicine - [facility]  11:33 AM

## 2017-12-27 ENCOUNTER — TELEPHONE (OUTPATIENT)
Dept: INTERNAL MEDICINE | Facility: CLINIC | Age: 27
End: 2017-12-27

## 2017-12-27 RX ORDER — CYCLOBENZAPRINE HCL 5 MG
5 TABLET ORAL 3 TIMES DAILY PRN
Qty: 30 TABLET | Refills: 1 | Status: SHIPPED | OUTPATIENT
Start: 2017-12-27 | End: 2018-01-06

## 2017-12-27 NOTE — TELEPHONE ENCOUNTER
----- Message from Jose Escalante sent at 12/27/2017  8:50 AM CST -----  Contact: self [phone]  Patient is returning a call from the office . Please advise , Thanks

## 2017-12-27 NOTE — TELEPHONE ENCOUNTER
----- Message from [place] Jennifer sent at 12/26/2017  9:02 AM CST -----  Contact: self/ [phone] cell  Type: Sooner appointment than  is able to schedule    When is the first available appointment? 01/02/2018    What is the nature of the appointment? Annual    What appointment type: epp    Comments: Pt would like to request a sooner appt than 01/02/2018 with Dr. Olvera or a later appt during the week, preferably after 3 pm.  She won't be available on Tuesday, the 2nd.  She would like to speak with someone in the office to discuss.  Please call and advise.      Thank you

## 2018-01-09 ENCOUNTER — CLINICAL SUPPORT (OUTPATIENT)
Dept: REHABILITATION | Facility: HOSPITAL | Age: 28
End: 2018-01-09
Attending: NURSE PRACTITIONER
Payer: COMMERCIAL

## 2018-01-09 DIAGNOSIS — G89.29 CHRONIC RIGHT-SIDED LOW BACK PAIN WITH RIGHT-SIDED SCIATICA: ICD-10-CM

## 2018-01-09 DIAGNOSIS — M54.41 CHRONIC RIGHT-SIDED LOW BACK PAIN WITH RIGHT-SIDED SCIATICA: ICD-10-CM

## 2018-01-09 PROBLEM — M54.40 CHRONIC RIGHT-SIDED LOW BACK PAIN WITH SCIATICA: Status: ACTIVE | Noted: 2018-01-09

## 2018-01-09 PROCEDURE — 97161 PT EVAL LOW COMPLEX 20 MIN: CPT | Performed by: PHYSICAL THERAPIST

## 2018-01-09 PROCEDURE — 97140 MANUAL THERAPY 1/> REGIONS: CPT | Performed by: PHYSICAL THERAPIST

## 2018-01-09 NOTE — PROGRESS NOTES
Physician:Jane Nazario,*  Diagnosis:  LBP with R sided sciatica  Encounter Diagnosis   Name Primary?    Chronic right-sided low back pain with right-sided sciatica       Orders:  Physical Therapy evaluate and treat  Treatment start time: 8:25  Treatment end time: 9:15    Subjective:  Pt c/o midline LBP with pain radiating from R buttock posteriorly to knee at times x 5 mo of insidious onset.  She rates pain as 0/10 presently, 6/10 with prolonged standing at work.     Chief complaint:  pain  Radicular symptoms:  R LE  Aggravating factors:   Standing; increased activity  Easing factors:  rest     Current functional status:   Independent with ADL    Patients structured exercise routine:    none  Exercise routine prior to onset :     walking    Special tests:    MRI:    none  Xray:    + for L-scoliosis and disc space narrowing    Past treatment includes:  none    Work:     pharmacist                             Pts goals:  Decrease pain    OBJECTIVE:  Postural examination:  Decreased lordosis in sitting; pelvis level     Functional assessment:   - walking:   independent             AROM:  WNL trunk, pain with extension     MMT:   Gross trunk 4+/5; 4/5 R hip abductors, 5/5 others B LE    Tone:  normal    Flexibility testing:   Tight hams and piriformis    Special tests:   + SLR on R; neg SI stresses    Palpation:   TTP R piriformis    Joint mobility: good    Swelling:  none    Other: 2+ quad reflex; sensation intact to light touch      History  Co-morbidities and personal factors that may impact the plan of care Examination  Body Structures and Functions, activity limitations and participation restrictions that may impact the plan of care    Clinical Presentation   Co-morbidities:   none        Personal Factors:   no deficits Body Regions:   back  trunk    Body Systems:    ROM  strength            Participation Restrictions:   No heavy lifting     Activity limitations:   Learning and applying knowledge  no  deficits    General Tasks and Commands  no deficits    Communication  no deficits    Mobility  no deficits    Self care  no deficits    Domestic Life  no deficits    Interactions/Relationships  no deficits    Life Areas  no deficits    Community and Social Life  no deficits         stable and uncomplicated                      low   low  low Decision Making/ Complexity Score:  Low/41% limitation           TREATMENT:    Today's treatment:  HP x 10 min, HEP and DN R piriformis after written consent given.    Pt was provided with a written copy of exercises to perform as tolerated, including: knee to chest, GS, SL hip abd, prone hip extension, LTR, priformis stretch and bridges.    Exercises were reviewed and pt was able to demonstrate them prior to the end of the session.     Pt was provided educational information, including: posture education.    Discussed insurance limitations with pt.     ASSESSMENT:  PT diagnosis: trunk/hip weakness with pain secondary to piriformis syndrome; possible HNP?   Patient can benefit from outpatient physical therapy and a home program  Prognosis is Good.    No cultural, environmental or spiritual barriers identified to treatment or learning.     Medical necessity is demonstrated by the following  IMPAIRMENTS:  Pain limits function of effected part for some activities and Weakness    GOALS:    4_   weeks. Pt agrees with goals set.  1. Independent with HEP.  2. Report decreased    LB    pain  <   / =  3/10 with adls such as walking  3. Increased MMT  for  Trunk/hip abductors to 4+/5 to 5/5 with ADL    4. Increased flexibility for piriformis to WNL with functional activities    PLAN:  Outpatient physical therapy 1-2 times weekly to include: pt ed, hep, therapeutic exercises, neuromuscular re-education/ balance exercises, joint mobilizations, modalities prn, and aquatic therapy.    Cont PT for  6         weeks.   I certify the need for these services   furnished under this plan of  treatment and while under my   care.____________________________________ Physician/Referring Practitioner Date   of Signature

## 2018-01-23 ENCOUNTER — CLINICAL SUPPORT (OUTPATIENT)
Dept: REHABILITATION | Facility: HOSPITAL | Age: 28
End: 2018-01-23
Attending: NURSE PRACTITIONER
Payer: COMMERCIAL

## 2018-01-23 DIAGNOSIS — G89.29 CHRONIC RIGHT-SIDED LOW BACK PAIN WITH RIGHT-SIDED SCIATICA: ICD-10-CM

## 2018-01-23 DIAGNOSIS — M54.41 CHRONIC RIGHT-SIDED LOW BACK PAIN WITH RIGHT-SIDED SCIATICA: ICD-10-CM

## 2018-01-23 PROCEDURE — 97140 MANUAL THERAPY 1/> REGIONS: CPT | Performed by: PHYSICAL THERAPIST

## 2018-01-23 PROCEDURE — 97110 THERAPEUTIC EXERCISES: CPT | Performed by: PHYSICAL THERAPIST

## 2018-01-23 NOTE — PROGRESS NOTES
" Outpatient Physical Therapy Progress Note     Time in: 8:20  Time out: 9:00  Ther ex time: 20 min    Visit # 2    Subjective:  Pt states R LB/buttock feels "real good" and rates pain as 0/10.  States doing HEP as instructed.    Objective:  MIN TTP R piriformis.    Treatment:  There ex and modalities for increased ROM/strength and improved ADL to include:  HP x 10 min, piriformis stretch 2 ways, prone alt UE lifts, prone opposite UE/LE lifts, manual piriformis stretch, HEP review and DN TP R piriformis.     Assessment:  Significant sx decrease.  Tolerated DN well.     Will the patient continue to benefit from skilled PT intervention?     yes    Medical necessity is demonstrated by:   Pain limits function of effected part for all activities  Unable to participate fully in daily activities      Progress towards goals: good    New/Revised Goals:    Plan:  Continue with established Plan of Care toward PT goals.        "

## 2018-01-26 ENCOUNTER — OFFICE VISIT (OUTPATIENT)
Dept: OPHTHALMOLOGY | Facility: CLINIC | Age: 28
End: 2018-01-26
Payer: COMMERCIAL

## 2018-01-26 DIAGNOSIS — H15.101 EPISCLERITIS OF RIGHT EYE: ICD-10-CM

## 2018-01-26 PROCEDURE — 99999 PR PBB SHADOW E&M-EST. PATIENT-LVL III: CPT | Mod: PBBFAC,,, | Performed by: OPHTHALMOLOGY

## 2018-01-26 PROCEDURE — 92002 INTRM OPH EXAM NEW PATIENT: CPT | Mod: S$GLB,,, | Performed by: OPHTHALMOLOGY

## 2018-01-26 NOTE — PROGRESS NOTES
HPI     Triage pt  Patient states OD red and eye pressure x 1 day ago which the redness has   improved.  No eye drops.  No pain, but pressure (1)    I have personally interviewed the patient, reviewed the history and   examined the patient and agree with the technician's exam.    Last edited by Michael Xie MD on 1/26/2018  4:03 PM. (History)            Assessment /Plan     For exam results, see Encounter Report.    Episcleritis of right eye      Artificial tears as desired. Return as needed.

## 2018-01-31 ENCOUNTER — CLINICAL SUPPORT (OUTPATIENT)
Dept: REHABILITATION | Facility: HOSPITAL | Age: 28
End: 2018-01-31
Attending: NURSE PRACTITIONER
Payer: COMMERCIAL

## 2018-01-31 DIAGNOSIS — R52 PAIN: ICD-10-CM

## 2018-01-31 PROCEDURE — 97140 MANUAL THERAPY 1/> REGIONS: CPT | Performed by: PHYSICAL THERAPIST

## 2018-01-31 PROCEDURE — 97110 THERAPEUTIC EXERCISES: CPT | Performed by: PHYSICAL THERAPIST

## 2018-01-31 NOTE — PROGRESS NOTES
" Outpatient Physical Therapy Progress Note     Time in: 8:50  Time out: 9:35  Ther ex time: 25 min    Visit # 3    Subjective:  Pt states R LB/buttock feel "alot better" and rates pain as 0/10.  States doing HEP as instructed.    Objective:  MIN TTP R piriformis.  TTP R piriformis.    Treatment:  There ex and modalities for increased ROM/strength and improved ADL to include:  HP x 10 min, piriformis stretch 2 ways, prone alt UE lifts, prone alt LE lifts, prone opposite UE/LE lifts, manual piriformis stretch, bridges, GTB ER, HEP review and DN TP R piriformis.     Assessment:  Progressing well.  No pain with treatment.     Will the patient continue to benefit from skilled PT intervention?     yes    Medical necessity is demonstrated by:   Pain limits function of effected part for all activities  Unable to participate fully in daily activities      Progress towards goals: good    New/Revised Goals:    Plan:  Continue with established Plan of Care toward PT goals.        "

## 2018-02-02 ENCOUNTER — CLINICAL SUPPORT (OUTPATIENT)
Dept: REHABILITATION | Facility: HOSPITAL | Age: 28
End: 2018-02-02
Attending: NURSE PRACTITIONER
Payer: COMMERCIAL

## 2018-02-02 DIAGNOSIS — R52 PAIN: ICD-10-CM

## 2018-02-02 PROCEDURE — 97110 THERAPEUTIC EXERCISES: CPT | Performed by: PHYSICAL THERAPIST

## 2018-02-02 PROCEDURE — 97140 MANUAL THERAPY 1/> REGIONS: CPT | Performed by: PHYSICAL THERAPIST

## 2018-02-02 NOTE — PROGRESS NOTES
" Outpatient Physical Therapy Progress Note     Time in: 7:30  Time out: 8:20  Ther ex time: 25 min    Visit # 4    Subjective:  Pt states R LB/buttock feel "good" and rates pain as 0/10.  States doing HEP as instructed.    Objective:  MIN TTP R piriformis.  Gross strength 4+/5 hip abductors.    Treatment:  There ex and modalities for increased ROM/strength and improved ADL to include:  HP x 10 min, piriformis stretch 2 ways, prone alt UE lifts, prone alt LE lifts, prone opposite UE/LE lifts, manual piriformis stretch, bridges, GTB ER, HEP review and DN TP R piriformis.     Assessment:  Progressing well.  No pain with treatment.     Will the patient continue to benefit from skilled PT intervention?     yes    Medical necessity is demonstrated by:   Pain limits function of effected part for all activities  Unable to participate fully in daily activities      Progress towards goals: good    New/Revised Goals:    Plan:  Continue with established Plan of Care toward PT goals.        "

## 2018-02-08 ENCOUNTER — TELEPHONE (OUTPATIENT)
Dept: INTERNAL MEDICINE | Facility: CLINIC | Age: 28
End: 2018-02-08

## 2018-02-08 RX ORDER — OSELTAMIVIR PHOSPHATE 75 MG/1
75 CAPSULE ORAL 2 TIMES DAILY
Qty: 10 CAPSULE | Refills: 0 | Status: SHIPPED | OUTPATIENT
Start: 2018-02-08 | End: 2018-02-13

## 2018-02-08 NOTE — TELEPHONE ENCOUNTER
----- Message from Mindy Vega sent at 2/7/2018  3:17 PM CST -----  Contact: self/[phone]  Patient is asking for a courtesy call in regards her appointment on 02/09/18 ( I offered the earliest 04/2018, but patient want something for this month). Please call and advice.       Thank you!!!

## 2018-02-19 DIAGNOSIS — Z01.419 ENCOUNTER FOR GYNECOLOGICAL EXAMINATION WITHOUT ABNORMAL FINDING: ICD-10-CM

## 2018-02-19 DIAGNOSIS — N92.6 IRREGULAR MENSES: ICD-10-CM

## 2018-02-19 RX ORDER — DESOGESTREL AND ETHINYL ESTRADIOL 0.15-0.03
KIT ORAL
Qty: 28 TABLET | Refills: 0 | Status: SHIPPED | OUTPATIENT
Start: 2018-02-19 | End: 2018-04-22 | Stop reason: SDUPTHER

## 2018-02-20 ENCOUNTER — OFFICE VISIT (OUTPATIENT)
Dept: INTERNAL MEDICINE | Facility: CLINIC | Age: 28
End: 2018-02-20
Payer: COMMERCIAL

## 2018-02-20 VITALS
DIASTOLIC BLOOD PRESSURE: 70 MMHG | HEART RATE: 76 BPM | HEIGHT: 64 IN | WEIGHT: 140.88 LBS | BODY MASS INDEX: 24.05 KG/M2 | SYSTOLIC BLOOD PRESSURE: 118 MMHG

## 2018-02-20 DIAGNOSIS — Z00.00 ROUTINE GENERAL MEDICAL EXAMINATION AT A HEALTH CARE FACILITY: Primary | ICD-10-CM

## 2018-02-20 DIAGNOSIS — Z71.84 TRAVEL ADVICE ENCOUNTER: ICD-10-CM

## 2018-02-20 DIAGNOSIS — Z01.84 IMMUNITY STATUS TESTING: ICD-10-CM

## 2018-02-20 PROCEDURE — 99395 PREV VISIT EST AGE 18-39: CPT | Mod: S$GLB,,, | Performed by: INTERNAL MEDICINE

## 2018-02-20 PROCEDURE — 99999 PR PBB SHADOW E&M-EST. PATIENT-LVL III: CPT | Mod: PBBFAC,,, | Performed by: INTERNAL MEDICINE

## 2018-02-20 RX ORDER — FLUTICASONE PROPIONATE 50 MCG
2 SPRAY, SUSPENSION (ML) NASAL DAILY
Qty: 16 G | Refills: 12 | Status: SHIPPED | OUTPATIENT
Start: 2018-02-20 | End: 2018-03-22

## 2018-02-20 NOTE — PROGRESS NOTES
"Chief Complaint: Annual exam     HPI: This is a 28 year old woman who presents for her annual exam. 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     She has intermittent carpal tunnel syndrome symptoms of the right hand.  She wears a wrist brace for carpal tunnel intermittently which helps.      Sleep is better. She will wake up once to urinate.            Past Medical History    Diagnosis  Date      Scoliosis          didn' t need brace      Heart murmur          evaluated as a child, told not to be a problem      Abnormal Pap smear of cervix                Past Surgical History    Procedure  Laterality  Date      Tonsillectomy, adenoidectomy          Nose surgery            correct fracture of nose      Breast augmentation                 Meds and allergies: updated on EPIC     Social History: updated     Family History: updated     Review of Systems:  General: No fever, chills, night sweats, weight loss, fatigue  HEENT: No visual changes, hearing loss, sore throat, post nasal drip. Intermittent sinus congestion.   Resp:  No cough, shortness of breath, dyspnea on exertion  Cardiovascular:  No chest pain, palpitations  GI: No nausea, vomiting, diarrhea, melana, bloody stools, heartburn. Has BM 3 times weekly - whole life  : No dysuria, hematuria,   Musculoskeletal: No joint pain, muscle pain  Neuro: No headaches. She has some right sciatic nerve pain and has been doing physical therapy which has helped. She has occasional right foot numbness which is better  Endocrine: No polydipsia, polyuria, hot or cold intolerance.  Heme: No anemia, easy bruising.  Skin: No rashes, hairloss. Has acne which is better  Breasts: No abnormalities  GYN: No periods. On ocp but does not take placebo. Saw Dr Markham     Physical exam:  /70   Pulse 76   Ht 5' 4" (1.626 m)   Wt 63.9 kg (140 lb 14 oz)   BMI 24.18 kg/m²   General: alert, oriented x 3, no apparent distress.  Affect normal  HEENT: Conjunctivae: " anicteric, PERRL, EOMI, TM clear, Oralpharynx clear  Neck: supple, no thyroid enlargement, no cervical lymphadenopathy  Resp: effort normal, lungs clear bilaterally  CV: Regular rate and rhythm with 3/6 systolic murmur, No gallops or rubs, no lower extremity edema,  Abdomen: soft, non-distended, non-tender, bowel sounds present, no hepatosplenomegaly.           Assessment/Plan:  Annual exam - discussed diet, exercise and safety issues.  [facility]  Travel clinic to travel to [facility]  She is to return in one year, sooner if issues  \          Answers for HPI/ROS submitted by the patient on 2/20/2018   activity change: No  unexpected weight change: No  neck pain: No  hearing loss: No  rhinorrhea: No  trouble swallowing: No  eye discharge: No  visual disturbance: No  chest tightness: No  wheezing: No  chest pain: No  palpitations: No  blood in stool: No  constipation: No  vomiting: No  diarrhea: No  polydipsia: No  polyuria: No  difficulty urinating: No  hematuria: No  menstrual problem: No  dysuria: No  joint swelling: No  arthralgias: No  headaches: No  weakness: No  confusion: No  dysphoric mood: No

## 2018-03-21 ENCOUNTER — OFFICE VISIT (OUTPATIENT)
Dept: INFECTIOUS DISEASES | Facility: CLINIC | Age: 28
End: 2018-03-21
Payer: COMMERCIAL

## 2018-03-21 VITALS
SYSTOLIC BLOOD PRESSURE: 120 MMHG | DIASTOLIC BLOOD PRESSURE: 73 MMHG | HEIGHT: 64 IN | BODY MASS INDEX: 23.9 KG/M2 | WEIGHT: 140 LBS | TEMPERATURE: 99 F | HEART RATE: 68 BPM

## 2018-03-21 DIAGNOSIS — Z71.84 COUNSELING FOR TRAVEL: Primary | ICD-10-CM

## 2018-03-21 PROCEDURE — 99999 PR PBB SHADOW E&M-EST. PATIENT-LVL III: CPT | Mod: PBBFAC,,, | Performed by: PHYSICIAN ASSISTANT

## 2018-03-21 PROCEDURE — 99401 PREV MED CNSL INDIV APPRX 15: CPT | Mod: S$GLB,,, | Performed by: PHYSICIAN ASSISTANT

## 2018-03-21 RX ORDER — AZITHROMYCIN 500 MG/1
1000 TABLET, FILM COATED ORAL ONCE
Qty: 4 TABLET | Refills: 0 | Status: SHIPPED | OUTPATIENT
Start: 2018-03-21 | End: 2018-03-22 | Stop reason: SDUPTHER

## 2018-03-21 RX ORDER — ATOVAQUONE AND PROGUANIL HYDROCHLORIDE 250; 100 MG/1; MG/1
TABLET, FILM COATED ORAL
Qty: 17 TABLET | Refills: 0 | Status: SHIPPED | OUTPATIENT
Start: 2018-03-21 | End: 2018-05-07

## 2018-03-21 NOTE — PROGRESS NOTES
Travel Consult  Chief Complaint   Patient presents with    Travel Consult     Chelsie Almonte is here for travel consultation.  She is going to the [facility] [place] leaving 3/24 for [place] x 1 week.  She will going to [facility] [place] for vacation in Nov x 2 weeks.    [place]    Accommodations: hotel and resort  Purpose of travel: vacation  Currently ill / Fever: no  History of Splenectomy: no  The patient states that she does not live with a household member that has cancer, HIV infection, or take drugs to suppress the immune system.  Past Medical History:   Diagnosis Date    Abnormal Pap smear of cervix     Heart murmur     evaluated as a child, told not to be a problem    Scoliosis     didn' t need brace     Penicillins  Immunization History   Administered Date(s) Administered    Meningococcal Conjugate (MCV4P) 04/04/2016     ASSESSMENT: Travel  PLAN:  1. The Patient was provided with an extensive travel guidance packet which provides travel information specific to the patients itinerary.   2. The patient's medical history was reviewed and the patient was counseled on:  · Dietary precautions.  · Personal protective measures to prevent insect-borne diseases (e.g., malaria, dengue).  · Precautions to prevent exposure to rabies and seek treatment for possible exposures.  · Precautions against sun exposure.  · Precautions against development of DVT during flight.  · Personal and travel safety.  3. The patient's immunization history was reviewed and, based on the patient's itinerary, immunizations were ordered.    4. The patient was encouraged to contact us about any problems that may develop after immunization and possible side effects were reviewed.    5. The patient was instructed to purchase Imodium over the counter to take in case diarrhea (without blood or fever) develops.  An antibiotic was ordered for treatment if severe or bloody diarrhea develops and the patient was  instructed on use and possible side effects.    6. The patient was also instructed to purchase insect repellent containing DEET or [place] and apply according to repellent label instructions.  If indicated by the patients itinerary an anti-malarial agent was prescribed for malaria prophylaxis and possible side effects were reviewed.    7. The patient was instructed to contact us if problems develop after travel.  8. She has had the HEp A and B vaccines in the past.    9. She will be given Malarone and Azithromycin.  She was made aware Malarone is pregnancy category C  10. She was given a copy of the Zika and pregnancy sheet from the CDC.  11. She will be going to [place] later in the year and will contact me when her itinerary is set.    12. Typhoid vaccine was deferred given her short departure and she will be staying at a resort so suspect risk is minimal.  13.  The patient was encouraged to call the office for further concerns or complaints pr illness associated with her travel or vaccines.  Business card provided.

## 2018-03-22 DIAGNOSIS — Z71.84 COUNSELING FOR TRAVEL: ICD-10-CM

## 2018-03-22 RX ORDER — AZITHROMYCIN 500 MG/1
1000 TABLET, FILM COATED ORAL ONCE
Qty: 4 TABLET | Refills: 0 | Status: SHIPPED | OUTPATIENT
Start: 2018-03-22 | End: 2018-03-22

## 2018-04-22 DIAGNOSIS — N92.6 IRREGULAR MENSES: ICD-10-CM

## 2018-04-22 DIAGNOSIS — Z01.419 ENCOUNTER FOR GYNECOLOGICAL EXAMINATION WITHOUT ABNORMAL FINDING: ICD-10-CM

## 2018-04-22 RX ORDER — DESOGESTREL AND ETHINYL ESTRADIOL 0.15-0.03
KIT ORAL
Qty: 28 TABLET | Refills: 4 | Status: SHIPPED | OUTPATIENT
Start: 2018-04-22 | End: 2018-07-11 | Stop reason: SDUPTHER

## 2018-05-07 ENCOUNTER — OFFICE VISIT (OUTPATIENT)
Dept: OBSTETRICS AND GYNECOLOGY | Facility: CLINIC | Age: 28
End: 2018-05-07
Payer: COMMERCIAL

## 2018-05-07 VITALS
WEIGHT: 140.88 LBS | HEIGHT: 64 IN | BODY MASS INDEX: 24.05 KG/M2 | DIASTOLIC BLOOD PRESSURE: 72 MMHG | SYSTOLIC BLOOD PRESSURE: 100 MMHG

## 2018-05-07 DIAGNOSIS — Z01.419 ENCOUNTER FOR GYNECOLOGICAL EXAMINATION WITHOUT ABNORMAL FINDING: Primary | ICD-10-CM

## 2018-05-07 DIAGNOSIS — Z30.9 ENCOUNTER FOR CONTRACEPTIVE MANAGEMENT, UNSPECIFIED TYPE: ICD-10-CM

## 2018-05-07 PROCEDURE — 99999 PR PBB SHADOW E&M-EST. PATIENT-LVL III: CPT | Mod: PBBFAC,,, | Performed by: OBSTETRICS & GYNECOLOGY

## 2018-05-07 PROCEDURE — 99395 PREV VISIT EST AGE 18-39: CPT | Mod: S$GLB,,, | Performed by: OBSTETRICS & GYNECOLOGY

## 2018-05-07 RX ORDER — LEVONORGESTREL / ETHINYL ESTRADIOL AND ETHINYL ESTRADIOL 150-30(84)
1 KIT ORAL DAILY
Qty: 91 EACH | Refills: 3 | Status: SHIPPED | OUTPATIENT
Start: 2018-05-07 | End: 2019-05-02

## 2018-05-07 RX ORDER — CYCLOBENZAPRINE HCL 5 MG
5 TABLET ORAL 3 TIMES DAILY PRN
Refills: 1 | COMMUNITY
Start: 2018-04-24 | End: 2019-05-02

## 2018-05-07 RX ORDER — FLUTICASONE PROPIONATE 50 MCG
SPRAY, SUSPENSION (ML) NASAL
COMMUNITY
Start: 2018-04-05 | End: 2019-05-02

## 2018-05-07 NOTE — PROGRESS NOTES
"CC: Well woman exam    Chelsie Almonte is a 28 y.o. female  presents for a well woman exam.  She has no issues, problems, or complaints.  She was doing continuous OCPs but having BTB bleeding. Would like to try another birth control.   She is getting  and would like to skip cycles in five months for her wedding and honeymoon.    Past Medical History:   Diagnosis Date    Abnormal Pap smear of cervix     Heart murmur     evaluated as a child, told not to be a problem    Scoliosis     didn' t need brace       Past Surgical History:   Procedure Laterality Date    breast augmentation      NOSE SURGERY      correct fracture of nose    TONSILLECTOMY, ADENOIDECTOMY         OB History    Para Term  AB Living   0             SAB TAB Ectopic Multiple Live Births                         Family History   Problem Relation Age of Onset    Breast cancer Neg Hx     Colon cancer Neg Hx     Ovarian cancer Neg Hx        Social History   Substance Use Topics    Smoking status: Never Smoker    Smokeless tobacco: Never Used    Alcohol use 0.0 oz/week      Comment: once a week       /72 (BP Location: Right arm, Patient Position: Sitting, BP Method: Small (Manual))   Ht 5' 4" (1.626 m)   Wt 63.9 kg (140 lb 14 oz)   BMI 24.18 kg/m²     ROS:  GENERAL: Denies weight gain or weight loss. Feeling well overall.   SKIN: Denies rash or lesions.   HEAD: Denies head injury or headache.   NODES: Denies enlarged lymph nodes.   CHEST: Denies chest pain or shortness of breath.   CARDIOVASCULAR: Denies palpitations or left sided chest pain.   ABDOMEN: No abdominal pain, constipation, diarrhea, nausea, vomiting or rectal bleeding.   URINARY: No frequency, dysuria, hematuria, or burning on urination.  REPRODUCTIVE: See HPI.   BREASTS: The patient performs breast self-examination and denies pain, lumps, or nipple discharge.   HEMATOLOGIC: No easy bruisability or excessive bleeding.  MUSCULOSKELETAL: Denies " joint pain or swelling.   NEUROLOGIC: Denies syncope or weakness.   PSYCHIATRIC: Denies depression, anxiety or mood swings.    Physical Exam:    APPEARANCE: Well nourished, well developed, in no acute distress.  AFFECT: WNL, alert and oriented x 3  SKIN: No acne or hirsutism  NECK: Neck symmetric without masses or thyromegaly  NODES: No inguinal, cervical, axillary, or femoral lymph node enlargement  CHEST: Good respiratory effect  ABDOMEN: Soft.  No tenderness or masses.  No hepatosplenomegaly.  No hernias.  BREASTS: Symmetrical, no skin changes or visible lesions.  No palpable masses, nipple discharge bilaterally.  PELVIC: Normal external genitalia without lesions.  Normal hair distribution.  Adequate perineal body, normal urethral meatus.  Vagina moist and well rugated without lesions or discharge.  Cervix pink, without lesions, discharge or tenderness.  No significant cystocele or rectocele.  Bimanual exam shows uterus to be normal size, regular, mobile and nontender.  Adnexa without masses or tenderness.    EXTREMITIES: No edema.    ASSESSMENT AND PLAN  1. Encounter for gynecological examination without abnormal finding     2. Encounter for contraceptive management, unspecified type       Will switch over to seasonique continuous OCPs and see if the BTB improves./    Patient was counseled today on A.C.S. Pap guidelines and recommendations for yearly pelvic exams, mammograms and monthly self breast exams; to see her PCP for other health maintenance.     Follow-up in about 1 year (around 5/7/2019).

## 2018-07-11 DIAGNOSIS — Z01.419 ENCOUNTER FOR GYNECOLOGICAL EXAMINATION WITHOUT ABNORMAL FINDING: ICD-10-CM

## 2018-07-11 DIAGNOSIS — N92.6 IRREGULAR MENSES: ICD-10-CM

## 2018-07-11 RX ORDER — DESOGESTREL AND ETHINYL ESTRADIOL 0.15-0.03
KIT ORAL
Qty: 28 TABLET | Refills: 0 | Status: SHIPPED | OUTPATIENT
Start: 2018-07-11 | End: 2019-05-02

## 2019-01-08 ENCOUNTER — OFFICE VISIT (OUTPATIENT)
Dept: FAMILY MEDICINE | Facility: CLINIC | Age: 29
End: 2019-01-08
Payer: COMMERCIAL

## 2019-01-08 VITALS
TEMPERATURE: 99 F | WEIGHT: 149.38 LBS | SYSTOLIC BLOOD PRESSURE: 110 MMHG | HEART RATE: 64 BPM | DIASTOLIC BLOOD PRESSURE: 74 MMHG | RESPIRATION RATE: 14 BRPM | HEIGHT: 64 IN | BODY MASS INDEX: 25.5 KG/M2

## 2019-01-08 DIAGNOSIS — J06.9 UPPER RESPIRATORY TRACT INFECTION, UNSPECIFIED TYPE: Primary | ICD-10-CM

## 2019-01-08 DIAGNOSIS — J32.9 SINUSITIS, UNSPECIFIED CHRONICITY, UNSPECIFIED LOCATION: ICD-10-CM

## 2019-01-08 PROBLEM — J02.9 PHARYNGITIS: Status: RESOLVED | Noted: 2017-11-20 | Resolved: 2019-01-08

## 2019-01-08 PROBLEM — R30.0 DYSURIA: Status: RESOLVED | Noted: 2017-03-28 | Resolved: 2019-01-08

## 2019-01-08 PROBLEM — R52 PAIN: Status: RESOLVED | Noted: 2018-01-31 | Resolved: 2019-01-08

## 2019-01-08 PROBLEM — H66.002 ACUTE SUPPURATIVE OTITIS MEDIA OF LEFT EAR WITHOUT SPONTANEOUS RUPTURE OF TYMPANIC MEMBRANE: Status: RESOLVED | Noted: 2017-11-20 | Resolved: 2019-01-08

## 2019-01-08 PROCEDURE — 99999 PR PBB SHADOW E&M-EST. PATIENT-LVL III: CPT | Mod: PBBFAC,,, | Performed by: FAMILY MEDICINE

## 2019-01-08 PROCEDURE — 3008F PR BODY MASS INDEX (BMI) DOCUMENTED: ICD-10-PCS | Mod: CPTII,S$GLB,, | Performed by: FAMILY MEDICINE

## 2019-01-08 PROCEDURE — 3008F BODY MASS INDEX DOCD: CPT | Mod: CPTII,S$GLB,, | Performed by: FAMILY MEDICINE

## 2019-01-08 PROCEDURE — 99999 PR PBB SHADOW E&M-EST. PATIENT-LVL III: ICD-10-PCS | Mod: PBBFAC,,, | Performed by: FAMILY MEDICINE

## 2019-01-08 PROCEDURE — 99203 OFFICE O/P NEW LOW 30 MIN: CPT | Mod: 25,S$GLB,, | Performed by: FAMILY MEDICINE

## 2019-01-08 PROCEDURE — 99203 PR OFFICE/OUTPT VISIT, NEW, LEVL III, 30-44 MIN: ICD-10-PCS | Mod: 25,S$GLB,, | Performed by: FAMILY MEDICINE

## 2019-01-08 PROCEDURE — 96372 THER/PROPH/DIAG INJ SC/IM: CPT | Mod: S$GLB,,, | Performed by: FAMILY MEDICINE

## 2019-01-08 PROCEDURE — 96372 PR INJECTION,THERAP/PROPH/DIAG2ST, IM OR SUBCUT: ICD-10-PCS | Mod: S$GLB,,, | Performed by: FAMILY MEDICINE

## 2019-01-08 RX ORDER — TETANUS TOXOID, REDUCED DIPHTHERIA TOXOID AND ACELLULAR PERTUSSIS VACCINE, ADSORBED 5; 2.5; 8; 8; 2.5 [IU]/.5ML; [IU]/.5ML; UG/.5ML; UG/.5ML; UG/.5ML
SUSPENSION INTRAMUSCULAR
Refills: 0 | COMMUNITY
Start: 2018-12-05 | End: 2019-05-02

## 2019-01-08 RX ORDER — BENZONATATE 100 MG/1
100 CAPSULE ORAL 3 TIMES DAILY PRN
Qty: 30 CAPSULE | Refills: 0 | Status: SHIPPED | OUTPATIENT
Start: 2019-01-08 | End: 2019-01-18

## 2019-01-08 RX ORDER — METHYLPREDNISOLONE ACETATE 40 MG/ML
40 INJECTION, SUSPENSION INTRA-ARTICULAR; INTRALESIONAL; INTRAMUSCULAR; SOFT TISSUE
Status: COMPLETED | OUTPATIENT
Start: 2019-01-08 | End: 2019-01-08

## 2019-01-08 RX ORDER — FLUTICASONE PROPIONATE 50 MCG
SPRAY, SUSPENSION (ML) NASAL
COMMUNITY
Start: 2018-04-05 | End: 2019-01-08 | Stop reason: SDUPTHER

## 2019-01-08 RX ORDER — DOXYCYCLINE HYCLATE 100 MG
100 TABLET ORAL 2 TIMES DAILY
Qty: 20 TABLET | Refills: 0 | Status: SHIPPED | OUTPATIENT
Start: 2019-01-08 | End: 2019-01-19

## 2019-01-08 RX ADMIN — METHYLPREDNISOLONE ACETATE 40 MG: 40 INJECTION, SUSPENSION INTRA-ARTICULAR; INTRALESIONAL; INTRAMUSCULAR; SOFT TISSUE at 04:01

## 2019-01-08 NOTE — PROGRESS NOTES
Subjective:       Patient ID: Chelsie Almonte is a 28 y.o. female.    Chief Complaint: Cough and Sinusitis    HPI  28 year old female comes in with c/o uri that has been going on for 3 days. She has zyrtec and flonase without much help. She has a dry cough. She feels like she has some PND. She says she has pain in her sinuses. No fevers. No sick contacts. She does have some sore throat in the morning.    PMH, PSH, ALLERGIES, SH, FH reviewed in nurse's notes above  Medications reconciled in the nurse's notes    Review of Systems   Constitutional: Negative for chills and fever.   HENT: Positive for congestion, postnasal drip and sore throat. Negative for ear pain, rhinorrhea and trouble swallowing.    Eyes: Negative for redness and itching.   Respiratory: Positive for cough. Negative for shortness of breath and wheezing.    Cardiovascular: Negative for chest pain and palpitations.   Gastrointestinal: Negative for abdominal pain, diarrhea, nausea and vomiting.   Genitourinary: Negative for dysuria and frequency.   Skin: Negative for rash.   Neurological: Negative for weakness and headaches.       Objective:      Physical Exam   Constitutional: She is oriented to person, place, and time. She appears well-developed. No distress.   HENT:   Head: Normocephalic and atraumatic.   Eyes: Conjunctivae are normal. Pupils are equal, round, and reactive to light.   Neck: Normal range of motion. Neck supple. No thyromegaly present.   Cardiovascular: Normal rate, regular rhythm, normal heart sounds and intact distal pulses.   Pulmonary/Chest: Effort normal and breath sounds normal. No respiratory distress. She has no wheezes.   Abdominal: Soft. Bowel sounds are normal. There is no tenderness.   Musculoskeletal: Normal range of motion. She exhibits no edema.   Lymphadenopathy:     She has no cervical adenopathy.   Neurological: She is alert and oriented to person, place, and time.   Skin: Skin is warm and dry. No rash noted.    Psychiatric: She has a normal mood and affect. Her behavior is normal.   Nursing note and vitals reviewed.       Assessment/Plan:       Problem List Items Addressed This Visit     None      Visit Diagnoses     Upper respiratory tract infection, unspecified type    -  Primary    Relevant Medications    methylPREDNISolone acetate injection 40 mg (Start on 1/8/2019  4:45 PM)    benzonatate (TESSALON) 100 MG capsule    doxycycline (VIBRA-TABS) 100 MG tablet    Sinusitis, unspecified chronicity, unspecified location        Relevant Medications    methylPREDNISolone acetate injection 40 mg (Start on 1/8/2019  4:45 PM)    benzonatate (TESSALON) 100 MG capsule    doxycycline (VIBRA-TABS) 100 MG tablet      RTC if condition acutely worsens or any other concerns, otherwise RTC as scheduled

## 2019-03-29 ENCOUNTER — OFFICE VISIT (OUTPATIENT)
Dept: INTERNAL MEDICINE | Facility: CLINIC | Age: 29
End: 2019-03-29
Payer: COMMERCIAL

## 2019-03-29 VITALS
WEIGHT: 144.81 LBS | TEMPERATURE: 98 F | HEIGHT: 64 IN | OXYGEN SATURATION: 99 % | HEART RATE: 64 BPM | SYSTOLIC BLOOD PRESSURE: 120 MMHG | DIASTOLIC BLOOD PRESSURE: 74 MMHG | BODY MASS INDEX: 24.72 KG/M2

## 2019-03-29 DIAGNOSIS — N39.0 URINARY TRACT INFECTION WITH HEMATURIA, SITE UNSPECIFIED: Primary | ICD-10-CM

## 2019-03-29 DIAGNOSIS — R31.9 URINARY TRACT INFECTION WITH HEMATURIA, SITE UNSPECIFIED: Primary | ICD-10-CM

## 2019-03-29 LAB
BILIRUB SERPL-MCNC: ABNORMAL MG/DL
BLOOD URINE, POC: ABNORMAL
COLOR, POC UA: YELLOW
GLUCOSE UR QL STRIP: NORMAL
KETONES UR QL STRIP: ABNORMAL
LEUKOCYTE ESTERASE URINE, POC: ABNORMAL
NITRITE, POC UA: ABNORMAL
PH, POC UA: 6
PROTEIN, POC: 30
SPECIFIC GRAVITY, POC UA: 1.02
UROBILINOGEN, POC UA: NORMAL

## 2019-03-29 PROCEDURE — 99213 PR OFFICE/OUTPT VISIT, EST, LEVL III, 20-29 MIN: ICD-10-PCS | Mod: 25,S$GLB,, | Performed by: INTERNAL MEDICINE

## 2019-03-29 PROCEDURE — 3008F PR BODY MASS INDEX (BMI) DOCUMENTED: ICD-10-PCS | Mod: CPTII,S$GLB,, | Performed by: INTERNAL MEDICINE

## 2019-03-29 PROCEDURE — 87186 SC STD MICRODIL/AGAR DIL: CPT

## 2019-03-29 PROCEDURE — 3008F BODY MASS INDEX DOCD: CPT | Mod: CPTII,S$GLB,, | Performed by: INTERNAL MEDICINE

## 2019-03-29 PROCEDURE — 87088 URINE BACTERIA CULTURE: CPT

## 2019-03-29 PROCEDURE — 81002 URINALYSIS NONAUTO W/O SCOPE: CPT | Mod: S$GLB,,, | Performed by: INTERNAL MEDICINE

## 2019-03-29 PROCEDURE — 99999 PR PBB SHADOW E&M-EST. PATIENT-LVL III: ICD-10-PCS | Mod: PBBFAC,,, | Performed by: INTERNAL MEDICINE

## 2019-03-29 PROCEDURE — 87086 URINE CULTURE/COLONY COUNT: CPT

## 2019-03-29 PROCEDURE — 99999 PR PBB SHADOW E&M-EST. PATIENT-LVL III: CPT | Mod: PBBFAC,,, | Performed by: INTERNAL MEDICINE

## 2019-03-29 PROCEDURE — 87077 CULTURE AEROBIC IDENTIFY: CPT

## 2019-03-29 PROCEDURE — 99213 OFFICE O/P EST LOW 20 MIN: CPT | Mod: 25,S$GLB,, | Performed by: INTERNAL MEDICINE

## 2019-03-29 PROCEDURE — 81002 POCT URINE DIPSTICK WITHOUT MICROSCOPE: ICD-10-PCS | Mod: S$GLB,,, | Performed by: INTERNAL MEDICINE

## 2019-03-29 RX ORDER — NITROFURANTOIN 25; 75 MG/1; MG/1
100 CAPSULE ORAL 2 TIMES DAILY
Qty: 14 CAPSULE | Refills: 0 | Status: SHIPPED | OUTPATIENT
Start: 2019-03-29 | End: 2019-05-02

## 2019-03-29 NOTE — PROGRESS NOTES
"Subjective:       Patient ID: Chelsie Green is a 29 y.o. female.    Chief Complaint: Urinary Tract Infection    Dysuria started this am.  Has had some abdominal "heaviness" for a week.  NO fever or back pain    Review of Systems   Constitutional: Negative for activity change, chills, fatigue and fever.   HENT: Negative for congestion, ear pain, nosebleeds, postnasal drip, sinus pressure and sore throat.    Eyes: Negative.  Negative for visual disturbance.   Respiratory: Negative for cough, chest tightness, shortness of breath and wheezing.    Cardiovascular: Negative for chest pain.   Gastrointestinal: Negative for abdominal pain, diarrhea, nausea and vomiting.   Genitourinary: Negative for difficulty urinating, dysuria, frequency and urgency.   Musculoskeletal: Negative for arthralgias and neck stiffness.   Skin: Negative for rash.   Neurological: Negative for dizziness, weakness and headaches.   Psychiatric/Behavioral: Negative for sleep disturbance. The patient is not nervous/anxious.        Objective:      Physical Exam   Constitutional: She is oriented to person, place, and time. She appears well-developed and well-nourished.  Non-toxic appearance. No distress.   HENT:   Head: Normocephalic and atraumatic.   Right Ear: Tympanic membrane, external ear and ear canal normal.   Left Ear: Tympanic membrane, external ear and ear canal normal.   Eyes: Pupils are equal, round, and reactive to light. EOM are normal. No scleral icterus.   Neck: Normal range of motion. Neck supple. No thyromegaly present.   Cardiovascular: Normal rate, regular rhythm and normal heart sounds.   Pulmonary/Chest: Effort normal and breath sounds normal.   Abdominal: Soft. Bowel sounds are normal. She exhibits no mass. There is no tenderness. There is no rebound.   Musculoskeletal: Normal range of motion.   Lymphadenopathy:     She has no cervical adenopathy.   Neurological: She is alert and oriented to person, place, and time. She has " normal reflexes. She displays normal reflexes. No cranial nerve deficit. She exhibits normal muscle tone. Coordination normal.   Skin: Skin is warm and dry.   Psychiatric: She has a normal mood and affect. Her behavior is normal.       Assessment:       1. Urinary tract infection with hematuria, site unspecified        Plan:   Chelsie was seen today for urinary tract infection.    Diagnoses and all orders for this visit:    Urinary tract infection with hematuria, site unspecified  -     POCT urine dipstick without microscope  -     Urine culture    Other orders  -     nitrofurantoin, macrocrystal-monohydrate, (MACROBID) 100 MG capsule; Take 1 capsule (100 mg total) by mouth 2 (two) times daily.

## 2019-03-31 LAB — BACTERIA UR CULT: NORMAL

## 2019-04-15 ENCOUNTER — TELEPHONE (OUTPATIENT)
Dept: OBSTETRICS AND GYNECOLOGY | Facility: CLINIC | Age: 29
End: 2019-04-15

## 2019-04-15 ENCOUNTER — PATIENT MESSAGE (OUTPATIENT)
Dept: OBSTETRICS AND GYNECOLOGY | Facility: CLINIC | Age: 29
End: 2019-04-15

## 2019-04-15 DIAGNOSIS — Z34.90 PREGNANCY, UNSPECIFIED GESTATIONAL AGE: Primary | ICD-10-CM

## 2019-04-15 NOTE — TELEPHONE ENCOUNTER
----- Message from Monica Gore sent at 4/15/2019 12:13 PM CDT -----  Contact: lovely  Can the clinic reply in [facility]: no    Who Called: lovely    Date of Positive Preg Test: 04/12/2019    1st day of Last Menstrual Cycle: 03/15/2019    List Any Difficulties: no    What Number to Call Back: [phone]

## 2019-05-02 ENCOUNTER — PROCEDURE VISIT (OUTPATIENT)
Dept: OBSTETRICS AND GYNECOLOGY | Facility: CLINIC | Age: 29
End: 2019-05-02
Payer: COMMERCIAL

## 2019-05-02 ENCOUNTER — LAB VISIT (OUTPATIENT)
Dept: LAB | Facility: OTHER | Age: 29
End: 2019-05-02
Payer: COMMERCIAL

## 2019-05-02 ENCOUNTER — OFFICE VISIT (OUTPATIENT)
Dept: OBSTETRICS AND GYNECOLOGY | Facility: CLINIC | Age: 29
End: 2019-05-02
Payer: COMMERCIAL

## 2019-05-02 VITALS
DIASTOLIC BLOOD PRESSURE: 64 MMHG | SYSTOLIC BLOOD PRESSURE: 120 MMHG | BODY MASS INDEX: 24.62 KG/M2 | HEIGHT: 64 IN | WEIGHT: 144.19 LBS

## 2019-05-02 DIAGNOSIS — Z36.82 ENCOUNTER FOR (NT) NUCHAL TRANSLUCENCY SCAN: ICD-10-CM

## 2019-05-02 DIAGNOSIS — Z34.90 PREGNANCY, UNSPECIFIED GESTATIONAL AGE: ICD-10-CM

## 2019-05-02 DIAGNOSIS — Z32.01 POSITIVE PREGNANCY TEST: ICD-10-CM

## 2019-05-02 DIAGNOSIS — N91.5 OLIGOMENORRHEA, UNSPECIFIED TYPE: ICD-10-CM

## 2019-05-02 DIAGNOSIS — N91.5 OLIGOMENORRHEA, UNSPECIFIED TYPE: Primary | ICD-10-CM

## 2019-05-02 LAB
ABO + RH BLD: NORMAL
BASOPHILS # BLD AUTO: 0.02 K/UL (ref 0–0.2)
BASOPHILS NFR BLD: 0.2 % (ref 0–1.9)
BLD GP AB SCN CELLS X3 SERPL QL: NORMAL
DIFFERENTIAL METHOD: NORMAL
EOSINOPHIL # BLD AUTO: 0 K/UL (ref 0–0.5)
EOSINOPHIL NFR BLD: 0.4 % (ref 0–8)
ERYTHROCYTE [DISTWIDTH] IN BLOOD BY AUTOMATED COUNT: 12.5 % (ref 11.5–14.5)
HCT VFR BLD AUTO: 40.5 % (ref 37–48.5)
HGB BLD-MCNC: 13.8 G/DL (ref 12–16)
LYMPHOCYTES # BLD AUTO: 2.2 K/UL (ref 1–4.8)
LYMPHOCYTES NFR BLD: 27 % (ref 18–48)
MCH RBC QN AUTO: 28.8 PG (ref 27–31)
MCHC RBC AUTO-ENTMCNC: 34.1 G/DL (ref 32–36)
MCV RBC AUTO: 85 FL (ref 82–98)
MONOCYTES # BLD AUTO: 0.5 K/UL (ref 0.3–1)
MONOCYTES NFR BLD: 6.4 % (ref 4–15)
NEUTROPHILS # BLD AUTO: 5.4 K/UL (ref 1.8–7.7)
NEUTROPHILS NFR BLD: 65.9 % (ref 38–73)
PLATELET # BLD AUTO: 225 K/UL (ref 150–350)
PMV BLD AUTO: 10.6 FL (ref 9.2–12.9)
RBC # BLD AUTO: 4.79 M/UL (ref 4–5.4)
WBC # BLD AUTO: 8.25 K/UL (ref 3.9–12.7)

## 2019-05-02 PROCEDURE — 99999 PR PBB SHADOW E&M-EST. PATIENT-LVL III: CPT | Mod: PBBFAC,,, | Performed by: NURSE PRACTITIONER

## 2019-05-02 PROCEDURE — 3008F PR BODY MASS INDEX (BMI) DOCUMENTED: ICD-10-PCS | Mod: CPTII,S$GLB,, | Performed by: NURSE PRACTITIONER

## 2019-05-02 PROCEDURE — 87491 CHLMYD TRACH DNA AMP PROBE: CPT

## 2019-05-02 PROCEDURE — 36415 COLL VENOUS BLD VENIPUNCTURE: CPT

## 2019-05-02 PROCEDURE — 76801 OB US < 14 WKS SINGLE FETUS: CPT | Mod: S$GLB,,, | Performed by: OBSTETRICS & GYNECOLOGY

## 2019-05-02 PROCEDURE — 87147 CULTURE TYPE IMMUNOLOGIC: CPT

## 2019-05-02 PROCEDURE — 85025 COMPLETE CBC W/AUTO DIFF WBC: CPT

## 2019-05-02 PROCEDURE — 99999 PR PBB SHADOW E&M-EST. PATIENT-LVL III: ICD-10-PCS | Mod: PBBFAC,,, | Performed by: NURSE PRACTITIONER

## 2019-05-02 PROCEDURE — 86901 BLOOD TYPING SEROLOGIC RH(D): CPT

## 2019-05-02 PROCEDURE — 3008F BODY MASS INDEX DOCD: CPT | Mod: CPTII,S$GLB,, | Performed by: NURSE PRACTITIONER

## 2019-05-02 PROCEDURE — 86592 SYPHILIS TEST NON-TREP QUAL: CPT

## 2019-05-02 PROCEDURE — 76801 PR US, OB <14WKS, TRANSABD, SINGLE GESTATION: ICD-10-PCS | Mod: S$GLB,,, | Performed by: OBSTETRICS & GYNECOLOGY

## 2019-05-02 PROCEDURE — 88175 CYTOPATH C/V AUTO FLUID REDO: CPT

## 2019-05-02 PROCEDURE — 99214 PR OFFICE/OUTPT VISIT, EST, LEVL IV, 30-39 MIN: ICD-10-PCS | Mod: S$GLB,,, | Performed by: NURSE PRACTITIONER

## 2019-05-02 PROCEDURE — 87340 HEPATITIS B SURFACE AG IA: CPT

## 2019-05-02 PROCEDURE — 99214 OFFICE O/P EST MOD 30 MIN: CPT | Mod: S$GLB,,, | Performed by: NURSE PRACTITIONER

## 2019-05-02 PROCEDURE — 87086 URINE CULTURE/COLONY COUNT: CPT

## 2019-05-02 PROCEDURE — 86703 HIV-1/HIV-2 1 RESULT ANTBDY: CPT

## 2019-05-02 PROCEDURE — 87088 URINE BACTERIA CULTURE: CPT

## 2019-05-02 NOTE — PROGRESS NOTES
"  CC: Positive Pregnancy Test    HISTORY OF PRESENT ILLNESS:    Chelsie Green is a 29 y.o. female, ,  Presents today for a routine exam complaining of amenorrhea and positive home urine pregnancy test.  Patient's last menstrual period was 03/15/2019.   She is not currently on any contraception.  Reports nausea. Reports breast tenderness. Denies vaginal bleeding and pelvic pain.  Medical h/o innocent heart murmur.   She will be traveling to [place] next month.  Discussed threat of Zika virus and CDC recommendation to avoid areas on the watch list.   Works as a pharmacist.      ROS:  GENERAL: No weight changes. No swelling. No fatigue. No fever.  CARDIOVASCULAR: No chest pain. No shortness of breath. No leg cramps.   NEUROLOGICAL: No headaches. No vision changes.  BREASTS: No pain. No lumps. No discharge.  ABDOMEN: No pain. No diarrhea. No constipation.  REPRODUCTIVE: No abnormal bleeding.   VULVA: No pain. No lesions. No itching.  VAGINA: No relaxation. No itching. No odor. No discharge. No lesions.  URINARY: No incontinence. No nocturia. No frequency. No dysuria.    MEDICATIONS AND ALLERGIES:  Reviewed        COMPREHENSIVE GYN HISTORY:  PAP History: Denies abnormal Paps.  Infection History: Denies STDs. Denies PID.  Benign History: Denies uterine fibroids. Denies ovarian cysts. Denies endometriosis. Denies other conditions.  Cancer History: Denies cervical cancer. Denies uterine cancer or hyperplasia. Denies ovarian cancer. Denies vulvar cancer or pre-cancer. Denies vaginal cancer or pre-cancer. Denies breast cancer. Denies colon cancer.  Sexual Activity History: Reports currently being sexually active  Menstrual History: None.  Contraception: None    /64   Ht 5' 4" (1.626 m)   Wt 65.4 kg (144 lb 2.9 oz)   LMP 03/15/2019   BMI 24.75 kg/m²     PE:  AFFECT: Calm, alert and oriented X 3. Interactive during exam  GENERAL: Appears well-nourished, well-developed, in no acute distress.  HEAD: Normocephalic, " atruamatic  TEETH: Good dentition.  THYROID: No thyromegally   BREASTS: No masses, skin changes, nipple discharge or adenopathy bilaterally.  SKIN: Normal for race, warm, & dry. No lesions or rashes.  LUNGS: Easy and unlabored, clear to auscultation bilaterally.  HEART: Regular rate and rhythm   ABDOMEN: Soft and nontender without masses or organomegally.  VULVA: No lesions, masses or tenderness.  VAGINA: Moist and well rugated without lesions or discharge.  CERVIX: Moist and pink without lesions, discharge or tenderness.      UTERUS SIZE: 8 week size, nontender and without masses.  ADNEXA: No masses or tenderness.  ESTIMATE OF PELVIC CAPACITY: Adequate  EXTREMITIES: No cyanosis, clubbing or edema. No calf tenderness.  LYMPH NODES: No axillary or inguinal adenopathy.    PROCEDURES:  UPT Positive  Genprobe  Pap      ASSESSMENT/PLAN:  Amenorrhea  Positive urine pregnancy test (JOE: 19, EGA: 6w6d based on LMP)    -  Routine prenatal care    Nausea and vomiting in pregnancy    -  Education regarding lifestyle and dietary modifications    -  Advised use of B6/Unisom. Pt will notify us if no relief/worsening symptoms, will consider Zofran if needed.      1st TRIMESTER COUNSELING: Discussed all, booklet provided:  Common complaints of pregnancy  HIV and other routine prenatal tests including  genetic screening  Risk factors identified by prenatal history  Oriented to practice - discussed anticipated course of prenatal care & indications for Ultrasound  Childbirth classes/Hospital facilities   Nutrition and weight gain counseling  Toxoplasmosis precautions (Cats/Raw Meat)  Sexual activity and exercise  Environmental/Work hazards  Travel  Tobacco (Ask, Advise, Assess, Assist, and Arrange), as well as alcohol and drug use  Use of any medications (Including supplements, Vitamins, Herbs, or OTC Drugs)  Domestic violence  Seat belt use      TERATOLOGY COUNSELING:   Discussed indications and options for aneuploidy  screening - pamphlets given    -  Pt desires NT and orders placed    -  Pt declines testing  Dating US later today  FOLLOW-UP in 4 weeks with Dr. Sophie Paul, NP    OB/GYN

## 2019-05-02 NOTE — PROCEDURES
Procedures   Obstetrical ultrasound completed today.  See report in imaging section of [facility].

## 2019-05-03 LAB
BACTERIA UR CULT: NORMAL
C TRACH DNA SPEC QL NAA+PROBE: NOT DETECTED
HBV SURFACE AG SERPL QL IA: NEGATIVE
HIV 1+2 AB+HIV1 P24 AG SERPL QL IA: NEGATIVE
N GONORRHOEA DNA SPEC QL NAA+PROBE: NOT DETECTED
RPR SER QL: NORMAL

## 2019-05-04 ENCOUNTER — PATIENT MESSAGE (OUTPATIENT)
Dept: OBSTETRICS AND GYNECOLOGY | Facility: CLINIC | Age: 29
End: 2019-05-04

## 2019-05-06 ENCOUNTER — TELEPHONE (OUTPATIENT)
Dept: OBSTETRICS AND GYNECOLOGY | Facility: CLINIC | Age: 29
End: 2019-05-06

## 2019-05-06 ENCOUNTER — PATIENT MESSAGE (OUTPATIENT)
Dept: OBSTETRICS AND GYNECOLOGY | Facility: CLINIC | Age: 29
End: 2019-05-06

## 2019-05-06 DIAGNOSIS — O23.41 GROUP B STREPTOCOCCUS URINARY TRACT INFECTION AFFECTING PREGNANCY IN FIRST TRIMESTER: ICD-10-CM

## 2019-05-06 DIAGNOSIS — B95.1 GROUP B STREPTOCOCCUS URINARY TRACT INFECTION AFFECTING PREGNANCY IN FIRST TRIMESTER: ICD-10-CM

## 2019-05-06 RX ORDER — CLINDAMYCIN HYDROCHLORIDE 300 MG/1
300 CAPSULE ORAL EVERY 6 HOURS
Qty: 28 CAPSULE | Refills: 0 | Status: SHIPPED | OUTPATIENT
Start: 2019-05-06 | End: 2019-05-13

## 2019-05-08 ENCOUNTER — PATIENT MESSAGE (OUTPATIENT)
Dept: OBSTETRICS AND GYNECOLOGY | Facility: CLINIC | Age: 29
End: 2019-05-08

## 2019-05-08 DIAGNOSIS — Z36.82 ENCOUNTER FOR (NT) NUCHAL TRANSLUCENCY SCAN: Primary | ICD-10-CM

## 2019-05-23 ENCOUNTER — TELEPHONE (OUTPATIENT)
Dept: OBSTETRICS AND GYNECOLOGY | Facility: CLINIC | Age: 29
End: 2019-05-23

## 2019-05-23 ENCOUNTER — PATIENT MESSAGE (OUTPATIENT)
Dept: OBSTETRICS AND GYNECOLOGY | Facility: CLINIC | Age: 29
End: 2019-05-23

## 2019-05-23 NOTE — TELEPHONE ENCOUNTER
----- Message from Chelita العلي sent at 5/23/2019 12:52 PM CDT -----  Contact: pt  Name of Who is Calling: KEATON JURADO [[id number]]       What is the request in detail: Patient is requesting a call from staff she states she needs the CPT code for the maternity 21 testing so she can give to her insurance company to see if they cover the test  .....Please contact to further discuss and advise.     Can the clinic reply by [facility]: yes     [facility] [phone]

## 2019-05-23 NOTE — TELEPHONE ENCOUNTER
Patient will like to have [id number] instead of NT. Patient was advised to come in the same day to have the test done.

## 2019-05-24 ENCOUNTER — PATIENT MESSAGE (OUTPATIENT)
Dept: OBSTETRICS AND GYNECOLOGY | Facility: CLINIC | Age: 29
End: 2019-05-24

## 2019-06-04 ENCOUNTER — INITIAL PRENATAL (OUTPATIENT)
Dept: OBSTETRICS AND GYNECOLOGY | Facility: CLINIC | Age: 29
End: 2019-06-04
Payer: COMMERCIAL

## 2019-06-04 VITALS — SYSTOLIC BLOOD PRESSURE: 112 MMHG | BODY MASS INDEX: 24.64 KG/M2 | DIASTOLIC BLOOD PRESSURE: 80 MMHG | WEIGHT: 143.5 LBS

## 2019-06-04 DIAGNOSIS — O23.41 GROUP B STREPTOCOCCUS URINARY TRACT INFECTION AFFECTING PREGNANCY IN FIRST TRIMESTER: Primary | ICD-10-CM

## 2019-06-04 DIAGNOSIS — Z34.90 PREGNANCY, UNSPECIFIED GESTATIONAL AGE: ICD-10-CM

## 2019-06-04 DIAGNOSIS — B95.1 GROUP B STREPTOCOCCUS URINARY TRACT INFECTION AFFECTING PREGNANCY IN FIRST TRIMESTER: Primary | ICD-10-CM

## 2019-06-04 PROCEDURE — 0500F PR INITIAL PRENATAL CARE VISIT: ICD-10-PCS | Mod: S$GLB,,, | Performed by: OBSTETRICS & GYNECOLOGY

## 2019-06-04 PROCEDURE — 0500F INITIAL PRENATAL CARE VISIT: CPT | Mod: S$GLB,,, | Performed by: OBSTETRICS & GYNECOLOGY

## 2019-06-04 PROCEDURE — 99999 PR PBB SHADOW E&M-EST. PATIENT-LVL II: ICD-10-PCS | Mod: PBBFAC,,, | Performed by: OBSTETRICS & GYNECOLOGY

## 2019-06-04 PROCEDURE — 99999 PR PBB SHADOW E&M-EST. PATIENT-LVL II: CPT | Mod: PBBFAC,,, | Performed by: OBSTETRICS & GYNECOLOGY

## 2019-06-05 ENCOUNTER — PATIENT MESSAGE (OUTPATIENT)
Dept: OBSTETRICS AND GYNECOLOGY | Facility: CLINIC | Age: 29
End: 2019-06-05

## 2019-06-05 ENCOUNTER — PATIENT MESSAGE (OUTPATIENT)
Dept: ADMINISTRATIVE | Facility: OTHER | Age: 29
End: 2019-06-05

## 2019-06-05 ENCOUNTER — TELEPHONE (OUTPATIENT)
Dept: OBSTETRICS AND GYNECOLOGY | Facility: CLINIC | Age: 29
End: 2019-06-05

## 2019-06-05 NOTE — PROGRESS NOTES
This is patient's first OB visit with me.  Pt is a G1 at 11w4d.  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  Signed patient up for connected MOM today.  Pt would like aneuploidy screening.  Would like to do [id number].  Aware of out of pocket cost.  Will send for bloodwork today.  Labwork reviewed.  Discussed GBS UTI in detail.  Pt given clindamycin to treat it by NP.  Will send UCx to see if resolved.  Discussed that sensitivities were not done on GBS UTI and pt has PCN allergy.  Discussed that without sensitivities, may need to get vancomycin in labor.  Pt reports that she does NOT think that she has a PCN allergy as she has never had an issue in the past with PCN.  If UCx is positive for GBS, pt would like to try course of PCN for treatment.  Aware of risks if does have PCN allergy and would still like to try it because she does not want to have to get Vancomycin at time of delivery.  Will follow up UCx and treat as needed.  Otherwise doing well.  Given pregnancy A-Z booklet.  Other questions answered.  RTC in 4 weeks.

## 2019-06-12 ENCOUNTER — PATIENT MESSAGE (OUTPATIENT)
Dept: OBSTETRICS AND GYNECOLOGY | Facility: CLINIC | Age: 29
End: 2019-06-12

## 2019-06-21 ENCOUNTER — HOSPITAL ENCOUNTER (EMERGENCY)
Facility: HOSPITAL | Age: 29
Discharge: HOME | End: 2019-06-22
Attending: EMERGENCY MEDICINE
Payer: COMMERCIAL

## 2019-06-21 ENCOUNTER — NURSE TRIAGE (OUTPATIENT)
Dept: ADMINISTRATIVE | Facility: CLINIC | Age: 29
End: 2019-06-21

## 2019-06-21 VITALS
RESPIRATION RATE: 16 BRPM | OXYGEN SATURATION: 100 % | HEART RATE: 72 BPM | SYSTOLIC BLOOD PRESSURE: 130 MMHG | HEIGHT: 64 IN | BODY MASS INDEX: 24.41 KG/M2 | TEMPERATURE: 99 F | DIASTOLIC BLOOD PRESSURE: 60 MMHG | WEIGHT: 143 LBS

## 2019-06-21 DIAGNOSIS — N93.9 VAGINAL BLEEDING: ICD-10-CM

## 2019-06-21 PROCEDURE — 99284 PR EMERGENCY DEPT VISIT,LEVEL IV: ICD-10-PCS | Mod: ,,, | Performed by: EMERGENCY MEDICINE

## 2019-06-21 PROCEDURE — 99284 EMERGENCY DEPT VISIT MOD MDM: CPT

## 2019-06-21 PROCEDURE — 99284 EMERGENCY DEPT VISIT MOD MDM: CPT | Mod: ,,, | Performed by: EMERGENCY MEDICINE

## 2019-06-21 PROCEDURE — 81025 URINE PREGNANCY TEST: CPT | Performed by: EMERGENCY MEDICINE

## 2019-06-22 LAB
ANION GAP SERPL CALC-SCNC: 8 MMOL/L (ref 8–16)
B-HCG UR QL: POSITIVE
BACTERIA #/AREA URNS AUTO: ABNORMAL /HPF
BASOPHILS # BLD AUTO: 0.01 K/UL (ref 0–0.2)
BASOPHILS NFR BLD: 0.1 % (ref 0–1.9)
BILIRUB UR QL STRIP: NEGATIVE
BUN SERPL-MCNC: 10 MG/DL (ref 6–20)
CALCIUM SERPL-MCNC: 9.7 MG/DL (ref 8.7–10.5)
CHLORIDE SERPL-SCNC: 103 MMOL/L (ref 95–110)
CLARITY UR REFRACT.AUTO: ABNORMAL
CO2 SERPL-SCNC: 24 MMOL/L (ref 23–29)
COLOR UR AUTO: YELLOW
CREAT SERPL-MCNC: 0.7 MG/DL (ref 0.5–1.4)
CTP QC/QA: YES
DIFFERENTIAL METHOD: NORMAL
EOSINOPHIL # BLD AUTO: 0.1 K/UL (ref 0–0.5)
EOSINOPHIL NFR BLD: 0.6 % (ref 0–8)
ERYTHROCYTE [DISTWIDTH] IN BLOOD BY AUTOMATED COUNT: 12.7 % (ref 11.5–14.5)
EST. GFR  (AFRICAN AMERICAN): >60 ML/MIN/1.73 M^2
EST. GFR  (NON AFRICAN AMERICAN): >60 ML/MIN/1.73 M^2
GLUCOSE SERPL-MCNC: 86 MG/DL (ref 70–110)
GLUCOSE UR QL STRIP: NEGATIVE
HCG INTACT+B SERPL-ACNC: NORMAL MIU/ML
HCT VFR BLD AUTO: 38.3 % (ref 37–48.5)
HGB BLD-MCNC: 12.7 G/DL (ref 12–16)
HGB UR QL STRIP: ABNORMAL
IMM GRANULOCYTES # BLD AUTO: 0.03 K/UL (ref 0–0.04)
IMM GRANULOCYTES NFR BLD AUTO: 0.3 % (ref 0–0.5)
KETONES UR QL STRIP: NEGATIVE
LEUKOCYTE ESTERASE UR QL STRIP: NEGATIVE
LYMPHOCYTES # BLD AUTO: 3.1 K/UL (ref 1–4.8)
LYMPHOCYTES NFR BLD: 28.3 % (ref 18–48)
MCH RBC QN AUTO: 28.9 PG (ref 27–31)
MCHC RBC AUTO-ENTMCNC: 33.2 G/DL (ref 32–36)
MCV RBC AUTO: 87 FL (ref 82–98)
MICROSCOPIC COMMENT: ABNORMAL
MONOCYTES # BLD AUTO: 0.7 K/UL (ref 0.3–1)
MONOCYTES NFR BLD: 6.4 % (ref 4–15)
NEUTROPHILS # BLD AUTO: 7 K/UL (ref 1.8–7.7)
NEUTROPHILS NFR BLD: 64.3 % (ref 38–73)
NITRITE UR QL STRIP: NEGATIVE
NRBC BLD-RTO: 0 /100 WBC
PH UR STRIP: 8 [PH] (ref 5–8)
PLATELET # BLD AUTO: 194 K/UL (ref 150–350)
PMV BLD AUTO: 10.1 FL (ref 9.2–12.9)
POTASSIUM SERPL-SCNC: 3.5 MMOL/L (ref 3.5–5.1)
PROT UR QL STRIP: NEGATIVE
RBC # BLD AUTO: 4.4 M/UL (ref 4–5.4)
RBC #/AREA URNS AUTO: 44 /HPF (ref 0–4)
SODIUM SERPL-SCNC: 135 MMOL/L (ref 136–145)
SP GR UR STRIP: 1.01 (ref 1–1.03)
URN SPEC COLLECT METH UR: ABNORMAL
WBC # BLD AUTO: 10.9 K/UL (ref 3.9–12.7)

## 2019-06-22 PROCEDURE — 80048 BASIC METABOLIC PNL TOTAL CA: CPT

## 2019-06-22 PROCEDURE — 85025 COMPLETE CBC W/AUTO DIFF WBC: CPT

## 2019-06-22 PROCEDURE — 81001 URINALYSIS AUTO W/SCOPE: CPT

## 2019-06-22 PROCEDURE — 84702 CHORIONIC GONADOTROPIN TEST: CPT

## 2019-06-22 PROCEDURE — 87086 URINE CULTURE/COLONY COUNT: CPT

## 2019-06-22 NOTE — ED TRIAGE NOTES
Pt states that she is 14 weeks pregnant and started to have vaginal bleeding about an hour ago. Pt states she was having intercourse with her  when it started. Pt denies N/V or abdominal cramping.

## 2019-06-22 NOTE — TELEPHONE ENCOUNTER
Reason for Disposition   MODERATE vaginal bleeding (i.e., soaking 1 pad / hour; clots)    Protocols used: PREGNANCY - VAGINAL BLEEDING LESS THAN 20 WEEKS EGA-A-  14 weeks pregnant..   Had intercourse 30 mins ago.. And now is bleeding. Turned toilet water pink after emptying bladder now wearing panty liner. Panty liner dry. Pt emptied bladder a second time and again turned toilet water pink. Pt seeing bright red blood on tissue when wiping. No blood on panty liner. But blood when wiping , no abd pains. Offered to contact MD on call. Pt transferred to speak with MD on diogo. ED warnings given. Call back with questions

## 2019-06-22 NOTE — DISCHARGE INSTRUCTIONS
Your ultrasound and other blood tests were reassuring.    There were some bacteria in you're urinalysis.  Urine culture was sent.  Please follow that culture up with her gyn doctor.    Contact her gyn doctor on Monday to schedule follow-up.    Refrain from sexual intercourse until cleared by your gyn doctor.    If you have large amounts of bleeding (1 saturated pad an hour for 2-3 hours), strong abdominal pain, feeling weak, dizzy, like going to pass out, return to the emergency department for re-evaluation.

## 2019-06-22 NOTE — ED NOTES
Patient identifiers for Chelsie Green checked and correct.  LOC: Patient is awake, alert, and aware of environment with an appropriate affect. Patient is oriented x 3 and speaking appropriately.  APPEARANCE: Patient resting comfortably and in no acute distress. Patient is clean and well groomed, patient's clothing is properly fastened.  SKIN: The skin is warm and dry. Patient has normal skin turgor and moist mucus membrances. Skin is intact; no bruising or breakdown noted.  MUSKULOSKELETAL: Patient is moving all extremities well, no obvious deformities noted. Pulses intact.   RESPIRATORY: Airway is open and patent. Respirations are spontaneous and non-labored with normal effort and rate.  CARDIAC: Patient has a normal rate and rhythm. No peripheral edema noted. Capillary refill < 3 seconds.  ABDOMEN: No distention noted. Bowel sounds active in all 4 quadrants. Soft and non-tender upon palpation.  NEUROLOGICAL: PERRL. Facial expression is symmetrical. Hand grasps are equal bilaterally. Normal sensation in all extremities when touched with finger.  Allergies reported:   Review of patient's allergies indicates:   Allergen Reactions    Penicillins Rash

## 2019-06-22 NOTE — ED PROVIDER NOTES
Encounter Date: 6/21/2019    SCRIBE #1 NOTE: I, Lynn Alexandre, am scribing for, and in the presence of,  Dr. Parra. I have scribed the entire note.       History     Chief Complaint   Patient presents with    Vaginal Bleeding     14 weeks pregnant. Bleeding started tonight     29 y.o. female with medical conditions including heart murmur and scoliosis, who presents with complaint of vaginal bleeding, 14 weeks pregnant. Patient states she was having intercourse about an hour ago when she noticed vaginal bleeding. Patient states bleeding is slightly less than her regular period and more than spotting but is not passing any clots. Patient states she went to the bathroom shortly after arrival to the ED and her pad was saturated and she still saw blood on the toilet tissue after wiping. This is patients first pregnancy, with LMP March 15th 2019. Patient is taking prenatal medications. Last US was at 7 hand showed an IUP. Denies abdominal pain, vaginal discharge, vaginal pain, or urinary sx. Patient denies abdominal surgical history.    The history is provided by the patient.     Review of patient's allergies indicates:   Allergen Reactions    Penicillins Rash     Past Medical History:   Diagnosis Date    Abnormal Pap smear of cervix 2012    Heart murmur     evaluated as a child, told not to be a problem    Scoliosis     didn' t need brace     Past Surgical History:   Procedure Laterality Date    breast augmentation      NOSE SURGERY      correct fracture of nose    TONSILLECTOMY, ADENOIDECTOMY       Family History   Problem Relation Age of Onset    Breast cancer Neg Hx     Colon cancer Neg Hx     Ovarian cancer Neg Hx     Cancer Neg Hx      Social History     Tobacco Use    Smoking status: Never Smoker    Smokeless tobacco: Never Used   Substance Use Topics    Alcohol use: Not Currently     Alcohol/week: 0.0 oz     Comment: once a week    Drug use: No     Review of Systems   Constitutional: Negative  for chills and fever.   HENT: Negative for rhinorrhea and sore throat.    Eyes: Negative for visual disturbance.   Respiratory: Negative for cough and shortness of breath.    Gastrointestinal: Negative for abdominal pain, nausea and vomiting.   Genitourinary: Positive for vaginal bleeding. Negative for dysuria, vaginal discharge and vaginal pain.   Musculoskeletal: Negative for back pain and gait problem.   Skin: Negative for rash.   Neurological: Negative for weakness and numbness.       Physical Exam     Initial Vitals [06/21/19 2249]   BP Pulse Resp Temp SpO2   130/60 72 16 98.9 °F (37.2 °C) 100 %      MAP       --         Physical Exam    Nursing note and vitals reviewed.    Physical Exam:  GENERAL APPEARANCE: Well developed, well nourished, in no acute distress.  HENT: Normocephalic, atraumatic    EYES: Sclerae anicteric   NECK: Supple  LUNGS: Breathing comfortably. Speaking in full sentences   ABDOMEN: Soft, non tender. Non distension.  Gravid.  PELVIC:  No adnexal tenderness, no cervical motion tenderness, there is a small amount of blood in the cervical os no active hemorrhaging, cervix is visualized and is closed, no obvious cervical friability / or irritation, no lacerations  NEUROLOGIC: Alert, interacting normally. No facial droop.   MSK: Moving all four extremities.  Skin: Warm and dry. No visible rash on exposed areas of skin.    Psych: Mood and affect normal.     ED Course   Procedures  Labs Reviewed   POCT URINE PREGNANCY - Abnormal; Notable for the following components:       Result Value    POC Preg Test, Ur Positive (*)     All other components within normal limits   CBC W/ AUTO DIFFERENTIAL   HCG, QUANTITATIVE, PREGNANCY   BASIC METABOLIC PANEL   URINALYSIS, REFLEX TO URINE CULTURE   URINALYSIS MICROSCOPIC          Imaging Results    None          Medical Decision Making:   History:   Old Medical Records: I decided to obtain old medical records.  Initial Assessment:   Vaginal bleeding tonight  after intercourse in the context of being 14 weeks pregnant.     A positive in the chart.  No need for RhoGAM,    No abdominal pain. Abdominal tenderness, abdominal exam is benign.     Beside US shows clear IUP with positive FH and movement.    Os is closed.    Will get screening labs and urine studies. If labs are unremarkable OBGYN follow up tomorrow.   Clinical Tests:   Lab Tests: Ordered and Reviewed  ED Management:  Update:  Labs are unremarkable.  UA with bacteria, culture sent.  Will not treat at this time as possibly contaminated sample, and asymptomatic bacteriuria treated in pregnancy is for culture positive results.  At Follow-up of culture with no there is no growth.    Patient is pain free in the emergency department.  Briefly discussed with OBGYN resident agrees with plan and ED workup.  Likely related to intercourse, significantly less likely threatened miscarriage.    Patient is safe for outpatient OBGYN follow-up.  Pelvic rest pending clearance from OBGYN.  Return precautions for new bleeding, cramping, passing clots, weakness, dizziness, shortness of breath, or any other new, worsening or worrisome symptoms.    Patient agrees with plan and verbalized understanding of return precautions.    MDM Complexity Points:   Problem Points:  1.New problem, with additional ED work-up planned - vaginal bleeding in pregnancy as post intercourse    Data Points:  Review or order clinical lab tests, Decision to obtain old records (in the EHR), Review and summarization of old records and Discuss test with performing physician/consulting physician - discussed with OBGYN team.       Other:   I have discussed this case with another health care provider.            Scribe Attestation:   Scribe #1: I performed the above scribed service and the documentation accurately describes the services I performed. I attest to the accuracy of the note.               Clinical Impression:       ICD-10-CM ICD-9-CM   1. Vaginal bleeding  N93.9 623.8                                Henry Parra MD  06/24/19 1424

## 2019-06-23 LAB — BACTERIA UR CULT: NO GROWTH

## 2019-06-24 ENCOUNTER — TELEPHONE (OUTPATIENT)
Dept: OBSTETRICS AND GYNECOLOGY | Facility: CLINIC | Age: 29
End: 2019-06-24

## 2019-06-24 ENCOUNTER — PATIENT MESSAGE (OUTPATIENT)
Dept: OBSTETRICS AND GYNECOLOGY | Facility: CLINIC | Age: 29
End: 2019-06-24

## 2019-06-24 NOTE — TELEPHONE ENCOUNTER
Called pt to know if she's still bleeding. Pt denies any bleeding just brown discharge. Informed pt that it was old blood coming out. Informed pt if she starts to bleed again and is concerned, or if she fills up a pad within an hour to reach out to us. Pt verbalized understanding.

## 2019-06-27 ENCOUNTER — PATIENT MESSAGE (OUTPATIENT)
Dept: OBSTETRICS AND GYNECOLOGY | Facility: CLINIC | Age: 29
End: 2019-06-27

## 2019-06-28 ENCOUNTER — PATIENT MESSAGE (OUTPATIENT)
Dept: OBSTETRICS AND GYNECOLOGY | Facility: CLINIC | Age: 29
End: 2019-06-28

## 2019-06-28 DIAGNOSIS — R51.9 PREGNANCY HEADACHE IN SECOND TRIMESTER: Primary | ICD-10-CM

## 2019-06-28 DIAGNOSIS — O26.892 PREGNANCY HEADACHE IN SECOND TRIMESTER: Primary | ICD-10-CM

## 2019-06-28 RX ORDER — BUTALBITAL, ACETAMINOPHEN AND CAFFEINE 50; 325; 40 MG/1; MG/1; MG/1
1 TABLET ORAL EVERY 6 HOURS PRN
Qty: 30 TABLET | Refills: 0 | Status: SHIPPED | OUTPATIENT
Start: 2019-06-28 | End: 2019-07-28

## 2019-07-09 ENCOUNTER — TELEPHONE (OUTPATIENT)
Dept: OBSTETRICS AND GYNECOLOGY | Facility: CLINIC | Age: 29
End: 2019-07-09

## 2019-07-09 ENCOUNTER — LAB VISIT (OUTPATIENT)
Dept: LAB | Facility: OTHER | Age: 29
End: 2019-07-09
Payer: COMMERCIAL

## 2019-07-09 ENCOUNTER — PATIENT MESSAGE (OUTPATIENT)
Dept: OBSTETRICS AND GYNECOLOGY | Facility: CLINIC | Age: 29
End: 2019-07-09

## 2019-07-09 ENCOUNTER — ROUTINE PRENATAL (OUTPATIENT)
Dept: OBSTETRICS AND GYNECOLOGY | Facility: CLINIC | Age: 29
End: 2019-07-09
Payer: COMMERCIAL

## 2019-07-09 VITALS
WEIGHT: 151.25 LBS | DIASTOLIC BLOOD PRESSURE: 60 MMHG | SYSTOLIC BLOOD PRESSURE: 100 MMHG | BODY MASS INDEX: 25.96 KG/M2

## 2019-07-09 DIAGNOSIS — Z34.00 SUPERVISION OF NORMAL FIRST PREGNANCY, ANTEPARTUM: Primary | ICD-10-CM

## 2019-07-09 DIAGNOSIS — Z82.79 FAMILY HISTORY OF SPINA BIFIDA: ICD-10-CM

## 2019-07-09 DIAGNOSIS — Z34.00 SUPERVISION OF NORMAL FIRST PREGNANCY, ANTEPARTUM: ICD-10-CM

## 2019-07-09 PROCEDURE — 0502F SUBSEQUENT PRENATAL CARE: CPT | Mod: CPTII,S$GLB,, | Performed by: NURSE PRACTITIONER

## 2019-07-09 PROCEDURE — 99999 PR PBB SHADOW E&M-EST. PATIENT-LVL II: ICD-10-PCS | Mod: PBBFAC,,, | Performed by: NURSE PRACTITIONER

## 2019-07-09 PROCEDURE — 81511 FTL CGEN ABNOR FOUR ANAL: CPT

## 2019-07-09 PROCEDURE — 99999 PR PBB SHADOW E&M-EST. PATIENT-LVL II: CPT | Mod: PBBFAC,,, | Performed by: NURSE PRACTITIONER

## 2019-07-09 PROCEDURE — 0502F PR SUBSEQUENT PRENATAL CARE: ICD-10-PCS | Mod: CPTII,S$GLB,, | Performed by: NURSE PRACTITIONER

## 2019-07-09 PROCEDURE — 36415 COLL VENOUS BLD VENIPUNCTURE: CPT

## 2019-07-09 NOTE — PROGRESS NOTES
Here for routine OB appt at 16w4d, with no complaints.  Reports quickening. Denies VB and cramping.  Pain, bleeding, and PTL precautions given.  MFM anatomy scan ordered.  OB glucose and CBC with next appt.   F/U scheduled 8 weeks- Connected MOM.

## 2019-07-11 LAB
# FETUSES US: NORMAL
2ND TRIMESTER 4 SCREEN PNL SERPL: NEGATIVE
2ND TRIMESTER 4 SCREEN SERPL-IMP: NORMAL
AFP MOM SERPL: 1.57
AFP SERPL-MCNC: 55.6 NG/ML
AGE AT DELIVERY: 28
B-HCG MOM SERPL: 0.49
B-HCG SERPL-ACNC: 15.1 IU/ML
FET TS 21 RISK FROM MAT AGE: NORMAL
GA (DAYS): 4 D
GA (WEEKS): 16 WK
GA METHOD: NORMAL
IDDM PATIENT QL: NORMAL
INHIBIN A MOM SERPL: 1.22
INHIBIN A SERPL-MCNC: 193.1 PG/ML
SMOKING STATUS FTND: NO
TS 18 RISK FETUS: NORMAL
TS 21 RISK FETUS: NORMAL
U ESTRIOL MOM SERPL: 1.23
U ESTRIOL SERPL-MCNC: 1.28 NG/ML

## 2019-08-02 ENCOUNTER — PROCEDURE VISIT (OUTPATIENT)
Dept: MATERNAL FETAL MEDICINE | Facility: CLINIC | Age: 29
End: 2019-08-02
Payer: COMMERCIAL

## 2019-08-02 ENCOUNTER — PATIENT MESSAGE (OUTPATIENT)
Dept: ADMINISTRATIVE | Facility: OTHER | Age: 29
End: 2019-08-02

## 2019-08-02 DIAGNOSIS — Z34.00 SUPERVISION OF NORMAL FIRST PREGNANCY, ANTEPARTUM: ICD-10-CM

## 2019-08-02 PROCEDURE — 76805 PR US, OB 14+WKS, TRANSABD, SINGLE GESTATION: ICD-10-PCS | Mod: S$GLB,,, | Performed by: OBSTETRICS & GYNECOLOGY

## 2019-08-02 PROCEDURE — 76805 OB US >/= 14 WKS SNGL FETUS: CPT | Mod: S$GLB,,, | Performed by: OBSTETRICS & GYNECOLOGY

## 2019-08-02 PROCEDURE — 99499 UNLISTED E&M SERVICE: CPT | Mod: S$GLB,,, | Performed by: OBSTETRICS & GYNECOLOGY

## 2019-08-02 PROCEDURE — 99499 NO LOS: ICD-10-PCS | Mod: S$GLB,,, | Performed by: OBSTETRICS & GYNECOLOGY

## 2019-08-08 ENCOUNTER — TELEPHONE (OUTPATIENT)
Dept: OBSTETRICS AND GYNECOLOGY | Facility: CLINIC | Age: 29
End: 2019-08-08

## 2019-08-08 NOTE — TELEPHONE ENCOUNTER
----- Message from Paola Rodriguez sent at 8/8/2019  2:32 PM CDT -----  Contact: KEATON JURADO [[id number]]  Type:  Patient Returning Call    Who Called: KEATON JURADO [[id number]]    Who Left Message for Patient:     Does the patient know what this is regarding?: yes    Best Call Back Number: [phone]    Additional Information:

## 2019-09-03 ENCOUNTER — TELEPHONE (OUTPATIENT)
Dept: OBSTETRICS AND GYNECOLOGY | Facility: CLINIC | Age: 29
End: 2019-09-03

## 2019-09-03 ENCOUNTER — ROUTINE PRENATAL (OUTPATIENT)
Dept: OBSTETRICS AND GYNECOLOGY | Facility: CLINIC | Age: 29
End: 2019-09-03
Payer: COMMERCIAL

## 2019-09-03 ENCOUNTER — LAB VISIT (OUTPATIENT)
Dept: LAB | Facility: OTHER | Age: 29
End: 2019-09-03
Payer: COMMERCIAL

## 2019-09-03 VITALS
DIASTOLIC BLOOD PRESSURE: 64 MMHG | BODY MASS INDEX: 26.94 KG/M2 | SYSTOLIC BLOOD PRESSURE: 132 MMHG | WEIGHT: 156.94 LBS

## 2019-09-03 DIAGNOSIS — Z34.00 SUPERVISION OF NORMAL FIRST PREGNANCY, ANTEPARTUM: ICD-10-CM

## 2019-09-03 DIAGNOSIS — Z34.02 SUPERVISION OF NORMAL FIRST PREGNANCY IN SECOND TRIMESTER: Primary | ICD-10-CM

## 2019-09-03 DIAGNOSIS — R73.9 ELEVATED BLOOD SUGAR: Primary | ICD-10-CM

## 2019-09-03 LAB
BASOPHILS # BLD AUTO: 0.03 K/UL (ref 0–0.2)
BASOPHILS NFR BLD: 0.2 % (ref 0–1.9)
DIFFERENTIAL METHOD: ABNORMAL
EOSINOPHIL # BLD AUTO: 0.1 K/UL (ref 0–0.5)
EOSINOPHIL NFR BLD: 0.9 % (ref 0–8)
ERYTHROCYTE [DISTWIDTH] IN BLOOD BY AUTOMATED COUNT: 12.5 % (ref 11.5–14.5)
GLUCOSE SERPL-MCNC: 144 MG/DL (ref 70–140)
HCT VFR BLD AUTO: 37 % (ref 37–48.5)
HGB BLD-MCNC: 12.3 G/DL (ref 12–16)
IMM GRANULOCYTES # BLD AUTO: 0.06 K/UL (ref 0–0.04)
IMM GRANULOCYTES NFR BLD AUTO: 0.4 % (ref 0–0.5)
LYMPHOCYTES # BLD AUTO: 1.6 K/UL (ref 1–4.8)
LYMPHOCYTES NFR BLD: 11.8 % (ref 18–48)
MCH RBC QN AUTO: 28.8 PG (ref 27–31)
MCHC RBC AUTO-ENTMCNC: 33.2 G/DL (ref 32–36)
MCV RBC AUTO: 87 FL (ref 82–98)
MONOCYTES # BLD AUTO: 0.8 K/UL (ref 0.3–1)
MONOCYTES NFR BLD: 5.5 % (ref 4–15)
NEUTROPHILS # BLD AUTO: 11.2 K/UL (ref 1.8–7.7)
NEUTROPHILS NFR BLD: 81.2 % (ref 38–73)
NRBC BLD-RTO: 0 /100 WBC
PLATELET # BLD AUTO: 188 K/UL (ref 150–350)
PMV BLD AUTO: 10.3 FL (ref 9.2–12.9)
RBC # BLD AUTO: 4.27 M/UL (ref 4–5.4)
WBC # BLD AUTO: 13.85 K/UL (ref 3.9–12.7)

## 2019-09-03 PROCEDURE — 99999 PR PBB SHADOW E&M-EST. PATIENT-LVL II: ICD-10-PCS | Mod: PBBFAC,,, | Performed by: OBSTETRICS & GYNECOLOGY

## 2019-09-03 PROCEDURE — 85025 COMPLETE CBC W/AUTO DIFF WBC: CPT

## 2019-09-03 PROCEDURE — 82950 GLUCOSE TEST: CPT

## 2019-09-03 PROCEDURE — 0502F PR SUBSEQUENT PRENATAL CARE: ICD-10-PCS | Mod: CPTII,S$GLB,, | Performed by: OBSTETRICS & GYNECOLOGY

## 2019-09-03 PROCEDURE — 36415 COLL VENOUS BLD VENIPUNCTURE: CPT

## 2019-09-03 PROCEDURE — 0502F SUBSEQUENT PRENATAL CARE: CPT | Mod: CPTII,S$GLB,, | Performed by: OBSTETRICS & GYNECOLOGY

## 2019-09-03 PROCEDURE — 99999 PR PBB SHADOW E&M-EST. PATIENT-LVL II: CPT | Mod: PBBFAC,,, | Performed by: OBSTETRICS & GYNECOLOGY

## 2019-09-03 NOTE — PROGRESS NOTES
Good FM. Denies VB, LOF, CTX. Labor, ROM and bleeding precautions. Glucola today.  Cold symptoms for 1 week - discussed safe medications in pregnancy. Having some insomnia - discussed unisom. F/U 3-4 weeks with Sophie. Will do Tdap at work.

## 2019-09-04 ENCOUNTER — PATIENT MESSAGE (OUTPATIENT)
Dept: OBSTETRICS AND GYNECOLOGY | Facility: CLINIC | Age: 29
End: 2019-09-04

## 2019-09-04 DIAGNOSIS — J32.9 SINUSITIS, UNSPECIFIED CHRONICITY, UNSPECIFIED LOCATION: Primary | ICD-10-CM

## 2019-09-05 ENCOUNTER — TELEPHONE (OUTPATIENT)
Dept: OBSTETRICS AND GYNECOLOGY | Facility: CLINIC | Age: 29
End: 2019-09-05

## 2019-09-05 DIAGNOSIS — J32.9 OTHER SINUSITIS, UNSPECIFIED CHRONICITY: Primary | ICD-10-CM

## 2019-09-05 RX ORDER — AMOXICILLIN AND CLAVULANATE POTASSIUM 875; 125 MG/1; MG/1
1 TABLET, FILM COATED ORAL 2 TIMES DAILY
Qty: 20 TABLET | Refills: 0 | Status: SHIPPED | OUTPATIENT
Start: 2019-09-05 | End: 2019-09-15

## 2019-09-05 RX ORDER — AMOXICILLIN 500 MG/1
500 CAPSULE ORAL EVERY 12 HOURS
Qty: 14 CAPSULE | Refills: 0 | Status: SHIPPED | OUTPATIENT
Start: 2019-09-05 | End: 2019-09-12

## 2019-09-05 NOTE — TELEPHONE ENCOUNTER
Pt called c/o cold (cough, sore throat, headache, etc.) for the past 9 days.  She has tried  Delsym, Tylenol, and used cough drops and have gotten little relief.  Reports over the past couple of days, the mucus has turned a yellow/green color, and today she is having a  a sharp pain in my ear.  RX for Amoxicillin sent- reports not a true allergy.

## 2019-09-09 ENCOUNTER — OFFICE VISIT (OUTPATIENT)
Dept: INTERNAL MEDICINE | Facility: CLINIC | Age: 29
End: 2019-09-09
Payer: COMMERCIAL

## 2019-09-09 VITALS
SYSTOLIC BLOOD PRESSURE: 120 MMHG | HEIGHT: 64 IN | WEIGHT: 156 LBS | DIASTOLIC BLOOD PRESSURE: 68 MMHG | TEMPERATURE: 99 F | BODY MASS INDEX: 26.63 KG/M2

## 2019-09-09 VITALS
HEIGHT: 64 IN | BODY MASS INDEX: 26.63 KG/M2 | HEART RATE: 70 BPM | WEIGHT: 156 LBS | SYSTOLIC BLOOD PRESSURE: 120 MMHG | DIASTOLIC BLOOD PRESSURE: 68 MMHG | TEMPERATURE: 99 F

## 2019-09-09 DIAGNOSIS — J06.9 UPPER RESPIRATORY TRACT INFECTION, UNSPECIFIED TYPE: Primary | ICD-10-CM

## 2019-09-09 PROCEDURE — 99999 PR PBB SHADOW E&M-EST. PATIENT-LVL II: CPT | Mod: PBBFAC,,, | Performed by: INTERNAL MEDICINE

## 2019-09-09 PROCEDURE — 99213 PR OFFICE/OUTPT VISIT, EST, LEVL III, 20-29 MIN: ICD-10-PCS | Mod: S$GLB,,, | Performed by: PHYSICIAN ASSISTANT

## 2019-09-09 PROCEDURE — 3008F PR BODY MASS INDEX (BMI) DOCUMENTED: ICD-10-PCS | Mod: CPTII,S$GLB,, | Performed by: PHYSICIAN ASSISTANT

## 2019-09-09 PROCEDURE — 3008F BODY MASS INDEX DOCD: CPT | Mod: CPTII,S$GLB,, | Performed by: PHYSICIAN ASSISTANT

## 2019-09-09 PROCEDURE — 99213 OFFICE O/P EST LOW 20 MIN: CPT | Mod: S$GLB,,, | Performed by: PHYSICIAN ASSISTANT

## 2019-09-09 PROCEDURE — 99499 NO LOS: ICD-10-PCS | Mod: S$GLB,,, | Performed by: INTERNAL MEDICINE

## 2019-09-09 PROCEDURE — 99999 PR PBB SHADOW E&M-EST. PATIENT-LVL III: ICD-10-PCS | Mod: PBBFAC,,, | Performed by: PHYSICIAN ASSISTANT

## 2019-09-09 PROCEDURE — 99499 UNLISTED E&M SERVICE: CPT | Mod: S$GLB,,, | Performed by: INTERNAL MEDICINE

## 2019-09-09 PROCEDURE — 99999 PR PBB SHADOW E&M-EST. PATIENT-LVL III: CPT | Mod: PBBFAC,,, | Performed by: PHYSICIAN ASSISTANT

## 2019-09-09 PROCEDURE — 99999 PR PBB SHADOW E&M-EST. PATIENT-LVL II: ICD-10-PCS | Mod: PBBFAC,,, | Performed by: INTERNAL MEDICINE

## 2019-09-09 NOTE — LETTER
September 9, 2019    Chelsie Green  [address]   [place] [address]         [facility]  [address]  [facility] [address]  Phone: [phone]  Fax: [phone] September 9, 2019     Patient: Chelsie Green   YOB: 1990   Date of Visit: 9/9/2019       To Whom It May Concern:    It is my medical opinion that Chelsie Green should remain out of work until 9/12/19.    If you have any questions or concerns, please don't hesitate to call.    Sincerely,        Marline Qureshi PA-C

## 2019-09-09 NOTE — PROGRESS NOTES
Subjective:       Patient ID: Chelsie Green is a 29 y.o. female.        Chief Complaint: No chief complaint on file.    Chelsie Green is an established patient of Kalee Olvera MD here today for urgent care visit.    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    Sx started 2 weeks ago.  Nasal congestion, right ear pain/pressure, cough, right eye crusting/watery discharge (started yesterday).  She does not wear contacts.  No blurred vision.    Started augmentin per GYN last week - sx about the same although ear is doing better.    25 weeks pregnant.         Review of Systems   Constitutional: Negative for chills, diaphoresis, fatigue and fever.   HENT: Positive for congestion and ear pain. Negative for sore throat.    Eyes: Positive for discharge and redness. Negative for visual disturbance.   Respiratory: Positive for cough. Negative for chest tightness and shortness of breath.    Cardiovascular: Negative for chest pain, palpitations and leg swelling.   Gastrointestinal: Negative for abdominal pain, blood in stool, constipation, diarrhea, nausea and vomiting.   Genitourinary: Negative for dysuria, frequency, hematuria and urgency.   Musculoskeletal: Negative for arthralgias and back pain.   Skin: Negative for rash.   Neurological: Negative for dizziness, syncope, weakness and headaches.   Psychiatric/Behavioral: Negative for dysphoric mood and sleep disturbance. The patient is not nervous/anxious.        Objective:      Physical Exam   Constitutional: She appears well-developed and well-nourished.   HENT:   Head: Normocephalic.   Right Ear: External ear normal. Tympanic membrane is not erythematous. A middle ear effusion is present.   Left Ear: External ear normal. Tympanic membrane is not erythematous. A middle ear effusion is present.   Nose: Mucosal edema and rhinorrhea present.   Mouth/Throat: Oropharynx is clear and moist.   Eyes: Pupils are equal, round, and reactive to light. EOM are normal. Right  "conjunctiva is injected. Left conjunctiva is not injected. Right eye exhibits normal extraocular motion. Left eye exhibits normal extraocular motion.   Cardiovascular: Normal rate, regular rhythm and normal heart sounds. Exam reveals no gallop and no friction rub.   No murmur heard.  Pulmonary/Chest: Effort normal and breath sounds normal. No respiratory distress.   Abdominal: Soft. Normal appearance. There is no tenderness.   Musculoskeletal: She exhibits no edema.   Neurological: She is alert.   Skin: Skin is warm and dry.   Psychiatric: She has a normal mood and affect.   Nursing note and vitals reviewed.      Assessment:       1. Upper respiratory tract infection, unspecified type        Plan:       Diagnoses and all orders for this visit:    Upper respiratory tract infection, unspecified type    Presumed viral at this point given lack of response to augmentin.  Her ear is doing better so possibility of ear infection that is treated now with augementin so will finish the course.  Drink plenty of fluids, get lots of rest, and follow-up poor results.    Pt has been given instructions populated from [employer] database and has verbalized understanding of the after visit summary and information contained wherein.    Follow up with a primary care provider. May go to ER for acute shortness of breath, lightheadedness, fever, or any other emergent complaints or changes in condition.    "This note will be shared with the patient"    Future Appointments   Date Time Provider Department Center   9/17/2019  7:00 AM LAB, [facility]   9/27/2019  8:30 AM Jane Escobar MD [facility] [place] [facility]   10/24/2019  8:30 AM Shannon Barrios MD [facility] [place] [facility]               "

## 2019-09-17 ENCOUNTER — LAB VISIT (OUTPATIENT)
Dept: LAB | Facility: OTHER | Age: 29
End: 2019-09-17
Payer: COMMERCIAL

## 2019-09-17 DIAGNOSIS — R73.9 ELEVATED BLOOD SUGAR: ICD-10-CM

## 2019-09-17 LAB
GLUCOSE SERPL-MCNC: 143 MG/DL
GLUCOSE SERPL-MCNC: 64 MG/DL
GLUCOSE SERPL-MCNC: 77 MG/DL (ref 70–110)
GLUCOSE SERPL-MCNC: 86 MG/DL

## 2019-09-17 PROCEDURE — 82951 GLUCOSE TOLERANCE TEST (GTT): CPT

## 2019-09-17 PROCEDURE — 82952 GTT-ADDED SAMPLES: CPT

## 2019-09-17 PROCEDURE — 36415 COLL VENOUS BLD VENIPUNCTURE: CPT

## 2019-09-25 ENCOUNTER — PATIENT OUTREACH (OUTPATIENT)
Dept: ADMINISTRATIVE | Facility: OTHER | Age: 29
End: 2019-09-25

## 2019-09-25 ENCOUNTER — IMMUNIZATION (OUTPATIENT)
Dept: PHARMACY | Facility: CLINIC | Age: 29
End: 2019-09-25
Payer: COMMERCIAL

## 2019-09-27 ENCOUNTER — ROUTINE PRENATAL (OUTPATIENT)
Dept: OBSTETRICS AND GYNECOLOGY | Facility: CLINIC | Age: 29
End: 2019-09-27
Payer: COMMERCIAL

## 2019-09-27 VITALS
SYSTOLIC BLOOD PRESSURE: 130 MMHG | BODY MASS INDEX: 27.06 KG/M2 | DIASTOLIC BLOOD PRESSURE: 66 MMHG | WEIGHT: 157.63 LBS

## 2019-09-27 DIAGNOSIS — Z34.03 SUPERVISION OF NORMAL FIRST PREGNANCY IN THIRD TRIMESTER: ICD-10-CM

## 2019-09-27 PROCEDURE — 99999 PR PBB SHADOW E&M-EST. PATIENT-LVL II: ICD-10-PCS | Mod: PBBFAC,,, | Performed by: OBSTETRICS & GYNECOLOGY

## 2019-09-27 PROCEDURE — 99999 PR PBB SHADOW E&M-EST. PATIENT-LVL II: CPT | Mod: PBBFAC,,, | Performed by: OBSTETRICS & GYNECOLOGY

## 2019-09-27 PROCEDURE — 0502F PR SUBSEQUENT PRENATAL CARE: ICD-10-PCS | Mod: CPTII,S$GLB,, | Performed by: OBSTETRICS & GYNECOLOGY

## 2019-09-27 PROCEDURE — 0502F SUBSEQUENT PRENATAL CARE: CPT | Mod: CPTII,S$GLB,, | Performed by: OBSTETRICS & GYNECOLOGY

## 2019-09-27 NOTE — Clinical Note
Hey, she is coming back to you for next visit. She wants IOL at 39 weeks - told her to discuss with you. I am on Sat 12/14 if that makes your life easier to do then, happy to help.

## 2019-09-27 NOTE — PROGRESS NOTES
Good FM. Denies VB, LOF, CTX. Labor, ROM and bleeding precautions. Patient interested in IOL at 39 weeks - to discuss with Dr. Barrios. F/U 4 weeks.

## 2019-10-01 ENCOUNTER — TELEPHONE (OUTPATIENT)
Dept: PEDIATRICS | Facility: CLINIC | Age: 29
End: 2019-10-01

## 2019-10-01 NOTE — TELEPHONE ENCOUNTER
----- Message from Vandana Post sent at 10/1/2019 12:45 PM CDT -----  Contact: The Pt-----[phone]  Type:  Needs Medical Advice    Who Called: MOM  Would the patient rather a call back Best Call Back Number:[phone]  Additional Information: Calling to get the provider to be her baby doctor .He already see's a family member -Sydney Almonte

## 2019-10-01 NOTE — TELEPHONE ENCOUNTER
Please advise,    Pt wants you to be PCP for their baby because you see family member. To find out the family member is the prospective parent's sibling not the future patient's. Are you willing to see or only if the pt is a sibling of a current patient.     Thank you

## 2019-10-03 ENCOUNTER — NURSE TRIAGE (OUTPATIENT)
Dept: ADMINISTRATIVE | Facility: CLINIC | Age: 29
End: 2019-10-03

## 2019-10-03 NOTE — TELEPHONE ENCOUNTER
"    Reason for Disposition   [1] Mild contractions AND [2] > 10 minutes apart    Additional Information   Negative: Passed out (i.e., lost consciousness, collapsed and was not responding)   Negative: Shock suspected (e.g., cold/pale/clammy skin, too weak to stand, low BP, rapid pulse)   Negative: Difficult to awaken or acting confused (e.g., disoriented, slurred speech)   Negative: [1] SEVERE abdominal pain (e.g., excruciating) AND [2] constant AND [3] present > 1 hour   Negative: Severe bleeding (e.g., continuous red blood from vagina, or large blood clots)   Negative: Umbilical cord hanging out of the vagina (shiny, white, curled appearance, "like telephone cord")   Negative: Uncontrollable urge to push (i.e., feels like baby is coming out now)   Negative: Can see baby   Negative: Sounds like a life-threatening emergency to the triager   Negative: MODERATE-SEVERE abdominal pain   Negative: Contractions < 10 minutes apart for 1 hour (i.e., 6 or more contractions an hour)   Negative: [1] Contractions > 10 minutes apart AND [2] persist > 24 hours AND [3] no improvement using CARE ADVICE   Negative: [1] Contractions AND [2] any vaginal bleeding (including: red blood, clots, spotting, or pink/brown mucous)   Negative: Vaginal bleeding  (Exception: brief spotting after intercourse or pelvic exam, or slight pinkish or brownish mucous discharge)   Negative: Leakage of fluid from vagina   Negative: [1] Baby moving less today (e.g., kick count < 5 in 1 hour or < 10 in 2 hours) AND [2] pregnant 23 or more weeks   Negative: No movement of baby for 8 hours   Negative: Severe headache or headache that won't go away   Negative: New blurred vision or vision changes   Negative: Fever > 100.4 F (38.0 C)   Negative: Increased pressure in pelvic area   Negative: [1] Lower back discomfort AND [2] not relieved by rest   Negative: Has a cerclage (i.e., a procedure where the obstetrician stitches shut the " "cervix)   Negative: Pinkish or brownish mucous discharge  (Exception: brief spotting after intercourse or pelvic exam)   Negative: Patient sounds very sick or weak to the triager   Negative: Baby has dropped   Negative: Increase in vaginal discharge   Negative: Diarrhea   Negative: Prior history of  labor    Protocols used: PREGNANCY - LABOR - [id number]    Chelsie, almost 29 weeks pregnant, first pregnancy, called to report she had irregular contractions last night between 2330 and 0100.  They lasted between approx 4 to 8 minutes, and she did not time them. Began lower abdomen and "wrapped around to my lower back."  Pain 5-6 of 10 at the time, and baby was moving as normal then and now.  She has not had any more contractions.  No bleeding, fluid discharge, or mucus plug.  She does report "whitish discharge like I have always had this pregnancy."  This morning, she does have some back discomfort, but "not more than usual"  Provided home care advice, to include hydration with water, rest, and timing of contractions / written record of symptoms when occur.  Encouraged call back for return of symptoms, and assured her message will be sent to Dr Shannon Barrios, her OB.  Please contact Chelsie directly with any additional care advice.    "

## 2019-10-22 ENCOUNTER — PATIENT OUTREACH (OUTPATIENT)
Dept: ADMINISTRATIVE | Facility: OTHER | Age: 29
End: 2019-10-22

## 2019-10-24 ENCOUNTER — PROCEDURE VISIT (OUTPATIENT)
Dept: OBSTETRICS AND GYNECOLOGY | Facility: CLINIC | Age: 29
End: 2019-10-24
Payer: COMMERCIAL

## 2019-10-24 ENCOUNTER — ROUTINE PRENATAL (OUTPATIENT)
Dept: OBSTETRICS AND GYNECOLOGY | Facility: CLINIC | Age: 29
End: 2019-10-24
Payer: COMMERCIAL

## 2019-10-24 VITALS
WEIGHT: 164.88 LBS | BODY MASS INDEX: 28.31 KG/M2 | DIASTOLIC BLOOD PRESSURE: 78 MMHG | SYSTOLIC BLOOD PRESSURE: 130 MMHG

## 2019-10-24 DIAGNOSIS — O36.5930 SMALL-FOR-DATES FETUS, THIRD TRIMESTER: ICD-10-CM

## 2019-10-24 DIAGNOSIS — Z34.03 SUPERVISION OF NORMAL FIRST PREGNANCY IN THIRD TRIMESTER: ICD-10-CM

## 2019-10-24 DIAGNOSIS — O36.5930 SMALL-FOR-DATES FETUS, THIRD TRIMESTER: Primary | ICD-10-CM

## 2019-10-24 PROCEDURE — 99499 NO LOS: ICD-10-PCS | Mod: S$GLB,,, | Performed by: PEDIATRICS

## 2019-10-24 PROCEDURE — 76816 PR  US,PREGNANT UTERUS,F/U,TRANSABD APP: ICD-10-PCS | Mod: S$GLB,,, | Performed by: PEDIATRICS

## 2019-10-24 PROCEDURE — 0502F SUBSEQUENT PRENATAL CARE: CPT | Mod: CPTII,S$GLB,, | Performed by: OBSTETRICS & GYNECOLOGY

## 2019-10-24 PROCEDURE — 99999 PR PBB SHADOW E&M-EST. PATIENT-LVL II: CPT | Mod: PBBFAC,,, | Performed by: OBSTETRICS & GYNECOLOGY

## 2019-10-24 PROCEDURE — 76816 OB US FOLLOW-UP PER FETUS: CPT | Mod: S$GLB,,, | Performed by: PEDIATRICS

## 2019-10-24 PROCEDURE — 99499 UNLISTED E&M SERVICE: CPT | Mod: S$GLB,,, | Performed by: PEDIATRICS

## 2019-10-24 PROCEDURE — 0502F PR SUBSEQUENT PRENATAL CARE: ICD-10-PCS | Mod: CPTII,S$GLB,, | Performed by: OBSTETRICS & GYNECOLOGY

## 2019-10-24 PROCEDURE — 99999 PR PBB SHADOW E&M-EST. PATIENT-LVL II: ICD-10-PCS | Mod: PBBFAC,,, | Performed by: OBSTETRICS & GYNECOLOGY

## 2019-10-24 NOTE — PROGRESS NOTES
Pt doing well.  Denies any issues at this time.  Denies regular CTX, VB, VD, LOF.  + FM.  Continue PNV.  Measuring small for dates.  Will send for growth US.  Labor precautions reviewed.  Interesting in IOL at 39 weeks. Will put in request for 12/16 @ 2000.  Pt to RTC in 2 week.

## 2019-10-25 ENCOUNTER — PATIENT MESSAGE (OUTPATIENT)
Dept: OBSTETRICS AND GYNECOLOGY | Facility: CLINIC | Age: 29
End: 2019-10-25

## 2019-11-07 ENCOUNTER — ROUTINE PRENATAL (OUTPATIENT)
Dept: OBSTETRICS AND GYNECOLOGY | Facility: CLINIC | Age: 29
End: 2019-11-07
Payer: COMMERCIAL

## 2019-11-07 VITALS
WEIGHT: 166.69 LBS | BODY MASS INDEX: 28.61 KG/M2 | SYSTOLIC BLOOD PRESSURE: 122 MMHG | DIASTOLIC BLOOD PRESSURE: 58 MMHG

## 2019-11-07 DIAGNOSIS — Z91.89 AT RISK FOR BREASTFEEDING DIFFICULTY: Primary | ICD-10-CM

## 2019-11-07 DIAGNOSIS — Z34.03 SUPERVISION OF NORMAL FIRST PREGNANCY IN THIRD TRIMESTER: ICD-10-CM

## 2019-11-07 PROCEDURE — 0502F PR SUBSEQUENT PRENATAL CARE: ICD-10-PCS | Mod: CPTII,S$GLB,, | Performed by: OBSTETRICS & GYNECOLOGY

## 2019-11-07 PROCEDURE — 99999 PR PBB SHADOW E&M-EST. PATIENT-LVL II: ICD-10-PCS | Mod: PBBFAC,,, | Performed by: OBSTETRICS & GYNECOLOGY

## 2019-11-07 PROCEDURE — 99999 PR PBB SHADOW E&M-EST. PATIENT-LVL II: CPT | Mod: PBBFAC,,, | Performed by: OBSTETRICS & GYNECOLOGY

## 2019-11-07 PROCEDURE — 0502F SUBSEQUENT PRENATAL CARE: CPT | Mod: CPTII,S$GLB,, | Performed by: OBSTETRICS & GYNECOLOGY

## 2019-11-07 NOTE — LETTER
November 7, 2019    Chelsie Green  [address]  [facility] [address]              [facility] [id number]  [address] [facility] [address]  Phone: [phone]    To Whom It May Concern:    Ms. Chelsie Green is currently under our care for her pregnancy.  Ms. Green is due on 12/16/2019, and therefore, will be in the hospital for her delivery on the day she is expected to attend jury duty. For this reason she is unable to participate in jury duty at this time.  If you have any questions, please feel free to call me.    Sincerely,        Shannon Barrios MD

## 2019-11-12 ENCOUNTER — PATIENT MESSAGE (OUTPATIENT)
Dept: OBSTETRICS AND GYNECOLOGY | Facility: CLINIC | Age: 29
End: 2019-11-12

## 2019-11-12 DIAGNOSIS — Z91.89 AT RISK FOR BREASTFEEDING DIFFICULTY: Primary | ICD-10-CM

## 2019-11-15 ENCOUNTER — TELEPHONE (OUTPATIENT)
Dept: OBSTETRICS AND GYNECOLOGY | Facility: CLINIC | Age: 29
End: 2019-11-15

## 2019-11-15 NOTE — TELEPHONE ENCOUNTER
----- Message from Jazzy Reardon MA sent at 11/14/2019  4:24 PM CST -----  Contact: Patient       ----- Message -----  From: Yaritza Frey  Sent: 11/14/2019   4:05 PM CST  To: Sophie Ortega Staff    Patient called to request a new breast pump prescription after notified by her insurance company that is now requesting for the patients gestation age on the prescription as well as the type of breast pump (double electric breast pump). The patient would like the updated prescription to be faxed to her insurance company at [phone]. The patient can be reached at [phone].

## 2019-11-19 ENCOUNTER — ROUTINE PRENATAL (OUTPATIENT)
Dept: OBSTETRICS AND GYNECOLOGY | Facility: CLINIC | Age: 29
End: 2019-11-19
Payer: COMMERCIAL

## 2019-11-19 ENCOUNTER — LAB VISIT (OUTPATIENT)
Dept: LAB | Facility: OTHER | Age: 29
End: 2019-11-19
Attending: OBSTETRICS & GYNECOLOGY
Payer: COMMERCIAL

## 2019-11-19 VITALS
BODY MASS INDEX: 28.99 KG/M2 | WEIGHT: 168.88 LBS | SYSTOLIC BLOOD PRESSURE: 126 MMHG | DIASTOLIC BLOOD PRESSURE: 72 MMHG

## 2019-11-19 DIAGNOSIS — Z34.03 SUPERVISION OF NORMAL FIRST PREGNANCY IN THIRD TRIMESTER: Primary | ICD-10-CM

## 2019-11-19 DIAGNOSIS — Z34.03 SUPERVISION OF NORMAL FIRST PREGNANCY IN THIRD TRIMESTER: ICD-10-CM

## 2019-11-19 DIAGNOSIS — O16.3 ELEVATED BLOOD PRESSURE AFFECTING PREGNANCY IN THIRD TRIMESTER, ANTEPARTUM: ICD-10-CM

## 2019-11-19 LAB
ALBUMIN SERPL BCP-MCNC: 3 G/DL (ref 3.5–5.2)
ALP SERPL-CCNC: 87 U/L (ref 55–135)
ALT SERPL W/O P-5'-P-CCNC: 19 U/L (ref 10–44)
ANION GAP SERPL CALC-SCNC: 10 MMOL/L (ref 8–16)
AST SERPL-CCNC: 18 U/L (ref 10–40)
BASOPHILS # BLD AUTO: 0.04 K/UL (ref 0–0.2)
BASOPHILS NFR BLD: 0.4 % (ref 0–1.9)
BILIRUB SERPL-MCNC: 0.3 MG/DL (ref 0.1–1)
BUN SERPL-MCNC: 11 MG/DL (ref 6–20)
CALCIUM SERPL-MCNC: 9.4 MG/DL (ref 8.7–10.5)
CHLORIDE SERPL-SCNC: 105 MMOL/L (ref 95–110)
CO2 SERPL-SCNC: 24 MMOL/L (ref 23–29)
CREAT SERPL-MCNC: 0.7 MG/DL (ref 0.5–1.4)
CREAT UR-MCNC: 123 MG/DL (ref 15–325)
DIFFERENTIAL METHOD: ABNORMAL
EOSINOPHIL # BLD AUTO: 0 K/UL (ref 0–0.5)
EOSINOPHIL NFR BLD: 0.3 % (ref 0–8)
ERYTHROCYTE [DISTWIDTH] IN BLOOD BY AUTOMATED COUNT: 12.9 % (ref 11.5–14.5)
EST. GFR  (AFRICAN AMERICAN): >60 ML/MIN/1.73 M^2
EST. GFR  (NON AFRICAN AMERICAN): >60 ML/MIN/1.73 M^2
GLUCOSE SERPL-MCNC: 83 MG/DL (ref 70–110)
HCT VFR BLD AUTO: 40.4 % (ref 37–48.5)
HGB BLD-MCNC: 13.3 G/DL (ref 12–16)
IMM GRANULOCYTES # BLD AUTO: 0.07 K/UL (ref 0–0.04)
IMM GRANULOCYTES NFR BLD AUTO: 0.6 % (ref 0–0.5)
LYMPHOCYTES # BLD AUTO: 2.2 K/UL (ref 1–4.8)
LYMPHOCYTES NFR BLD: 20.2 % (ref 18–48)
MCH RBC QN AUTO: 28.1 PG (ref 27–31)
MCHC RBC AUTO-ENTMCNC: 32.9 G/DL (ref 32–36)
MCV RBC AUTO: 85 FL (ref 82–98)
MONOCYTES # BLD AUTO: 0.7 K/UL (ref 0.3–1)
MONOCYTES NFR BLD: 6.6 % (ref 4–15)
NEUTROPHILS # BLD AUTO: 7.9 K/UL (ref 1.8–7.7)
NEUTROPHILS NFR BLD: 71.9 % (ref 38–73)
NRBC BLD-RTO: 0 /100 WBC
PLATELET # BLD AUTO: 188 K/UL (ref 150–350)
PMV BLD AUTO: 11.4 FL (ref 9.2–12.9)
POTASSIUM SERPL-SCNC: 3.7 MMOL/L (ref 3.5–5.1)
PROT SERPL-MCNC: 6.6 G/DL (ref 6–8.4)
PROT UR-MCNC: 8 MG/DL (ref 0–15)
PROT/CREAT UR: 0.07 MG/G{CREAT} (ref 0–0.2)
RBC # BLD AUTO: 4.73 M/UL (ref 4–5.4)
SODIUM SERPL-SCNC: 139 MMOL/L (ref 136–145)
WBC # BLD AUTO: 10.91 K/UL (ref 3.9–12.7)

## 2019-11-19 PROCEDURE — 86592 SYPHILIS TEST NON-TREP QUAL: CPT

## 2019-11-19 PROCEDURE — 99999 PR PBB SHADOW E&M-EST. PATIENT-LVL II: ICD-10-PCS | Mod: PBBFAC,,, | Performed by: OBSTETRICS & GYNECOLOGY

## 2019-11-19 PROCEDURE — 85025 COMPLETE CBC W/AUTO DIFF WBC: CPT

## 2019-11-19 PROCEDURE — 86703 HIV-1/HIV-2 1 RESULT ANTBDY: CPT

## 2019-11-19 PROCEDURE — 36415 COLL VENOUS BLD VENIPUNCTURE: CPT

## 2019-11-19 PROCEDURE — 99999 PR PBB SHADOW E&M-EST. PATIENT-LVL II: CPT | Mod: PBBFAC,,, | Performed by: OBSTETRICS & GYNECOLOGY

## 2019-11-19 PROCEDURE — 82570 ASSAY OF URINE CREATININE: CPT

## 2019-11-19 PROCEDURE — 0502F SUBSEQUENT PRENATAL CARE: CPT | Mod: CPTII,S$GLB,, | Performed by: OBSTETRICS & GYNECOLOGY

## 2019-11-19 PROCEDURE — 0502F PR SUBSEQUENT PRENATAL CARE: ICD-10-PCS | Mod: CPTII,S$GLB,, | Performed by: OBSTETRICS & GYNECOLOGY

## 2019-11-19 PROCEDURE — 80053 COMPREHEN METABOLIC PANEL: CPT

## 2019-11-19 NOTE — PROGRESS NOTES
Pt doing well.  Denies any issues at this time.  Having some pelvic pressure.  Recommend the maternity belt.  Denies regular CTX, VB, VD, LOF.  + FM.  Continue PNV.  Labor precautions reviewed.  Pt to RTC in 1-2 week.  3rd trim labwork ordered.  Pt with a couple elevated BP on Connected MOM.  Normal today in clinic.  Will get PreE labs for baseline.  Asymptomatic.  Hx of GBS UTI.. Need PCN in labor

## 2019-11-20 LAB
HIV 1+2 AB+HIV1 P24 AG SERPL QL IA: NEGATIVE
RPR SER QL: NORMAL

## 2019-11-25 ENCOUNTER — TELEPHONE (OUTPATIENT)
Dept: OBSTETRICS AND GYNECOLOGY | Facility: CLINIC | Age: 29
End: 2019-11-25

## 2019-11-25 NOTE — TELEPHONE ENCOUNTER
Contacted patient per Dr. Barrios to schedule B/P check due to high readings on connectedMOM. Patient agreed to 9AM on 11/26.

## 2019-11-26 ENCOUNTER — ROUTINE PRENATAL (OUTPATIENT)
Dept: OBSTETRICS AND GYNECOLOGY | Facility: CLINIC | Age: 29
End: 2019-11-26
Payer: COMMERCIAL

## 2019-11-26 VITALS
DIASTOLIC BLOOD PRESSURE: 84 MMHG | BODY MASS INDEX: 29.25 KG/M2 | WEIGHT: 170.44 LBS | SYSTOLIC BLOOD PRESSURE: 136 MMHG

## 2019-11-26 DIAGNOSIS — Z34.03 SUPERVISION OF NORMAL FIRST PREGNANCY IN THIRD TRIMESTER: ICD-10-CM

## 2019-11-26 DIAGNOSIS — O26.893 PREGNANCY HEADACHE IN THIRD TRIMESTER: Primary | ICD-10-CM

## 2019-11-26 DIAGNOSIS — R51.9 PREGNANCY HEADACHE IN THIRD TRIMESTER: Primary | ICD-10-CM

## 2019-11-26 PROCEDURE — 0502F PR SUBSEQUENT PRENATAL CARE: ICD-10-PCS | Mod: CPTII,S$GLB,, | Performed by: OBSTETRICS & GYNECOLOGY

## 2019-11-26 PROCEDURE — 0502F SUBSEQUENT PRENATAL CARE: CPT | Mod: CPTII,S$GLB,, | Performed by: OBSTETRICS & GYNECOLOGY

## 2019-11-26 PROCEDURE — 99999 PR PBB SHADOW E&M-EST. PATIENT-LVL II: CPT | Mod: PBBFAC,,, | Performed by: OBSTETRICS & GYNECOLOGY

## 2019-11-26 PROCEDURE — 99999 PR PBB SHADOW E&M-EST. PATIENT-LVL II: ICD-10-PCS | Mod: PBBFAC,,, | Performed by: OBSTETRICS & GYNECOLOGY

## 2019-11-26 RX ORDER — BUTALBITAL, ACETAMINOPHEN AND CAFFEINE 50; 325; 40 MG/1; MG/1; MG/1
1 TABLET ORAL EVERY 4 HOURS PRN
Qty: 20 TABLET | Refills: 0 | Status: SHIPPED | OUTPATIENT
Start: 2019-11-26 | End: 2019-12-26

## 2019-11-26 NOTE — PROGRESS NOTES
Pt doing well. Having mild range BP at home as noted on Connected MOM but continues to have normal BP in the office.  Pt reports that she even used her dad's BP machine to see if the BP would be different but it was mild range with his machine as well.  BP is 136/84 today. Having intermittent headaches that are only partially relieved with tylenol.  Pt has hx of headaches and had headaches in 2nd trimester as well, so this is not a new symptoms.  Baseline PC 0.07.  Labs normal.  + FM.  Continue PNV.  Discussed that patient is able to come in any time for a BP check.  If morning BP is elevated, will have patient come in for a check. RTC in 1 week

## 2019-11-29 ENCOUNTER — PATIENT MESSAGE (OUTPATIENT)
Dept: OBSTETRICS AND GYNECOLOGY | Facility: CLINIC | Age: 29
End: 2019-11-29

## 2019-12-04 ENCOUNTER — HOSPITAL ENCOUNTER (INPATIENT)
Facility: OTHER | Age: 29
LOS: 2 days | Discharge: HOME | End: 2019-12-06
Attending: OBSTETRICS & GYNECOLOGY | Admitting: OBSTETRICS & GYNECOLOGY
Payer: COMMERCIAL

## 2019-12-04 ENCOUNTER — ANESTHESIA EVENT (OUTPATIENT)
Dept: OBSTETRICS AND GYNECOLOGY | Facility: OTHER | Age: 29
End: 2019-12-04
Payer: COMMERCIAL

## 2019-12-04 ENCOUNTER — ANESTHESIA (OUTPATIENT)
Dept: OBSTETRICS AND GYNECOLOGY | Facility: OTHER | Age: 29
End: 2019-12-04
Payer: COMMERCIAL

## 2019-12-04 DIAGNOSIS — Z3A.37 37 WEEKS GESTATION OF PREGNANCY: ICD-10-CM

## 2019-12-04 DIAGNOSIS — O16.3 ELEVATED BLOOD PRESSURE AFFECTING PREGNANCY IN THIRD TRIMESTER, ANTEPARTUM: ICD-10-CM

## 2019-12-04 DIAGNOSIS — O13.3 GESTATIONAL HYPERTENSION, THIRD TRIMESTER: ICD-10-CM

## 2019-12-04 LAB
ABO + RH BLD: NORMAL
ALBUMIN SERPL BCP-MCNC: 3.1 G/DL (ref 3.5–5.2)
ALP SERPL-CCNC: 109 U/L (ref 55–135)
ALT SERPL W/O P-5'-P-CCNC: 21 U/L (ref 10–44)
ANION GAP SERPL CALC-SCNC: 11 MMOL/L (ref 8–16)
AST SERPL-CCNC: 31 U/L (ref 10–40)
BASOPHILS # BLD AUTO: 0.02 K/UL (ref 0–0.2)
BASOPHILS NFR BLD: 0.2 % (ref 0–1.9)
BILIRUB SERPL-MCNC: 0.4 MG/DL (ref 0.1–1)
BLD GP AB SCN CELLS X3 SERPL QL: NORMAL
BUN SERPL-MCNC: 7 MG/DL (ref 6–20)
CALCIUM SERPL-MCNC: 9.3 MG/DL (ref 8.7–10.5)
CHLORIDE SERPL-SCNC: 106 MMOL/L (ref 95–110)
CO2 SERPL-SCNC: 20 MMOL/L (ref 23–29)
CREAT SERPL-MCNC: 0.7 MG/DL (ref 0.5–1.4)
CREAT UR-MCNC: 48.9 MG/DL (ref 15–325)
DIFFERENTIAL METHOD: NORMAL
EOSINOPHIL # BLD AUTO: 0 K/UL (ref 0–0.5)
EOSINOPHIL NFR BLD: 0.3 % (ref 0–8)
ERYTHROCYTE [DISTWIDTH] IN BLOOD BY AUTOMATED COUNT: 13 % (ref 11.5–14.5)
EST. GFR  (AFRICAN AMERICAN): >60 ML/MIN/1.73 M^2
EST. GFR  (NON AFRICAN AMERICAN): >60 ML/MIN/1.73 M^2
GLUCOSE SERPL-MCNC: 77 MG/DL (ref 70–110)
HCT VFR BLD AUTO: 40.7 % (ref 37–48.5)
HGB BLD-MCNC: 13.7 G/DL (ref 12–16)
IMM GRANULOCYTES # BLD AUTO: 0.04 K/UL (ref 0–0.04)
IMM GRANULOCYTES NFR BLD AUTO: 0.4 % (ref 0–0.5)
LYMPHOCYTES # BLD AUTO: 2.5 K/UL (ref 1–4.8)
LYMPHOCYTES NFR BLD: 25.6 % (ref 18–48)
MCH RBC QN AUTO: 28.4 PG (ref 27–31)
MCHC RBC AUTO-ENTMCNC: 33.7 G/DL (ref 32–36)
MCV RBC AUTO: 84 FL (ref 82–98)
MONOCYTES # BLD AUTO: 0.6 K/UL (ref 0.3–1)
MONOCYTES NFR BLD: 6.6 % (ref 4–15)
NEUTROPHILS # BLD AUTO: 6.4 K/UL (ref 1.8–7.7)
NEUTROPHILS NFR BLD: 66.9 % (ref 38–73)
NRBC BLD-RTO: 0 /100 WBC
PLATELET # BLD AUTO: 199 K/UL (ref 150–350)
PMV BLD AUTO: 11.7 FL (ref 9.2–12.9)
POTASSIUM SERPL-SCNC: 4.1 MMOL/L (ref 3.5–5.1)
PROT SERPL-MCNC: 7.1 G/DL (ref 6–8.4)
PROT UR-MCNC: 8 MG/DL (ref 0–15)
PROT/CREAT UR: 0.16 MG/G{CREAT} (ref 0–0.2)
RBC # BLD AUTO: 4.83 M/UL (ref 4–5.4)
SODIUM SERPL-SCNC: 137 MMOL/L (ref 136–145)
WBC # BLD AUTO: 9.63 K/UL (ref 3.9–12.7)

## 2019-12-04 PROCEDURE — 86901 BLOOD TYPING SEROLOGIC RH(D): CPT

## 2019-12-04 PROCEDURE — 85025 COMPLETE CBC W/AUTO DIFF WBC: CPT

## 2019-12-04 PROCEDURE — 25000003 PHARM REV CODE 250: Performed by: ANESTHESIOLOGY

## 2019-12-04 PROCEDURE — 51702 INSERT TEMP BLADDER CATH: CPT

## 2019-12-04 PROCEDURE — 99284 EMERGENCY DEPT VISIT MOD MDM: CPT | Mod: 25,,, | Performed by: OBSTETRICS & GYNECOLOGY

## 2019-12-04 PROCEDURE — 59025 FETAL NON-STRESS TEST: CPT | Mod: 26,,, | Performed by: OBSTETRICS & GYNECOLOGY

## 2019-12-04 PROCEDURE — 27200710 HC EPIDURAL INFUSION PUMP SET: Performed by: ANESTHESIOLOGY

## 2019-12-04 PROCEDURE — 99284 PR EMERGENCY DEPT VISIT,LEVEL IV: ICD-10-PCS | Mod: 25,,, | Performed by: OBSTETRICS & GYNECOLOGY

## 2019-12-04 PROCEDURE — 59400 PRA FULL ROUT OBSTE CARE,VAGINAL DELIV: ICD-10-PCS | Mod: QY,,, | Performed by: ANESTHESIOLOGY

## 2019-12-04 PROCEDURE — 59025 FETAL NON-STRESS TEST: CPT

## 2019-12-04 PROCEDURE — 62326 NJX INTERLAMINAR LMBR/SAC: CPT | Performed by: ANESTHESIOLOGY

## 2019-12-04 PROCEDURE — 72100002 HC LABOR CARE, 1ST 8 HOURS

## 2019-12-04 PROCEDURE — 11000001 HC ACUTE MED/SURG PRIVATE ROOM

## 2019-12-04 PROCEDURE — 59025 PR FETAL 2N-STRESS TEST: ICD-10-PCS | Mod: 26,,, | Performed by: OBSTETRICS & GYNECOLOGY

## 2019-12-04 PROCEDURE — 27000181 HC CABLE, IUPC

## 2019-12-04 PROCEDURE — 59400 OBSTETRICAL CARE: CPT | Mod: QY,,, | Performed by: ANESTHESIOLOGY

## 2019-12-04 PROCEDURE — 80053 COMPREHEN METABOLIC PANEL: CPT

## 2019-12-04 PROCEDURE — 99285 EMERGENCY DEPT VISIT HI MDM: CPT | Mod: 25

## 2019-12-04 PROCEDURE — 82570 ASSAY OF URINE CREATININE: CPT

## 2019-12-04 PROCEDURE — C1751 CATH, INF, PER/CENT/MIDLINE: HCPCS | Performed by: ANESTHESIOLOGY

## 2019-12-04 PROCEDURE — 63600175 PHARM REV CODE 636 W HCPCS: Performed by: STUDENT IN AN ORGANIZED HEALTH CARE EDUCATION/TRAINING PROGRAM

## 2019-12-04 PROCEDURE — 86762 RUBELLA ANTIBODY: CPT

## 2019-12-04 PROCEDURE — 25000003 PHARM REV CODE 250: Performed by: STUDENT IN AN ORGANIZED HEALTH CARE EDUCATION/TRAINING PROGRAM

## 2019-12-04 RX ORDER — SODIUM CHLORIDE, SODIUM LACTATE, POTASSIUM CHLORIDE, CALCIUM CHLORIDE 600; 310; 30; 20 MG/100ML; MG/100ML; MG/100ML; MG/100ML
INJECTION, SOLUTION INTRAVENOUS CONTINUOUS
Status: DISCONTINUED | OUTPATIENT
Start: 2019-12-04 | End: 2019-12-05

## 2019-12-04 RX ORDER — PROCHLORPERAZINE MALEATE 5 MG
5 TABLET ORAL ONCE
Status: DISCONTINUED | OUTPATIENT
Start: 2019-12-04 | End: 2019-12-04

## 2019-12-04 RX ORDER — OXYTOCIN/RINGER'S LACTATE 30/500 ML
2 PLASTIC BAG, INJECTION (ML) INTRAVENOUS CONTINUOUS
Status: DISCONTINUED | OUTPATIENT
Start: 2019-12-04 | End: 2019-12-05

## 2019-12-04 RX ORDER — LIDOCAINE HYDROCHLORIDE AND EPINEPHRINE 15; 5 MG/ML; UG/ML
INJECTION, SOLUTION EPIDURAL
Status: DISCONTINUED | OUTPATIENT
Start: 2019-12-04 | End: 2019-12-05

## 2019-12-04 RX ORDER — OXYTOCIN/RINGER'S LACTATE 30/500 ML
333 PLASTIC BAG, INJECTION (ML) INTRAVENOUS CONTINUOUS
Status: ACTIVE | OUTPATIENT
Start: 2019-12-04 | End: 2019-12-04

## 2019-12-04 RX ORDER — SODIUM CHLORIDE 9 MG/ML
INJECTION, SOLUTION INTRAVENOUS
Status: DISCONTINUED | OUTPATIENT
Start: 2019-12-04 | End: 2019-12-05

## 2019-12-04 RX ORDER — FENTANYL/BUPIVACAINE/NS/PF 2MCG/ML-.1
PLASTIC BAG, INJECTION (ML) INJECTION CONTINUOUS PRN
Status: DISCONTINUED | OUTPATIENT
Start: 2019-12-04 | End: 2019-12-05

## 2019-12-04 RX ORDER — ONDANSETRON 8 MG/1
8 TABLET, ORALLY DISINTEGRATING ORAL EVERY 8 HOURS PRN
Status: DISCONTINUED | OUTPATIENT
Start: 2019-12-04 | End: 2019-12-05

## 2019-12-04 RX ORDER — PROCHLORPERAZINE MALEATE 5 MG
10 TABLET ORAL ONCE
Status: COMPLETED | OUTPATIENT
Start: 2019-12-04 | End: 2019-12-04

## 2019-12-04 RX ORDER — CALCIUM CARBONATE 200(500)MG
500 TABLET,CHEWABLE ORAL 3 TIMES DAILY PRN
Status: DISCONTINUED | OUTPATIENT
Start: 2019-12-04 | End: 2019-12-05

## 2019-12-04 RX ORDER — FENTANYL/BUPIVACAINE/NS/PF 2MCG/ML-.1
PLASTIC BAG, INJECTION (ML) INJECTION
Status: DISPENSED
Start: 2019-12-04 | End: 2019-12-05

## 2019-12-04 RX ORDER — OXYTOCIN/RINGER'S LACTATE 30/500 ML
95 PLASTIC BAG, INJECTION (ML) INTRAVENOUS CONTINUOUS
Status: ACTIVE | OUTPATIENT
Start: 2019-12-04 | End: 2019-12-04

## 2019-12-04 RX ORDER — CEFAZOLIN SODIUM 2 G/50ML
2 SOLUTION INTRAVENOUS ONCE AS NEEDED
Status: DISCONTINUED | OUTPATIENT
Start: 2019-12-04 | End: 2019-12-05

## 2019-12-04 RX ORDER — BUTALBITAL, ACETAMINOPHEN AND CAFFEINE 50; 325; 40 MG/1; MG/1; MG/1
2 TABLET ORAL ONCE
Status: DISCONTINUED | OUTPATIENT
Start: 2019-12-04 | End: 2019-12-05

## 2019-12-04 RX ORDER — SIMETHICONE 80 MG
1 TABLET,CHEWABLE ORAL 4 TIMES DAILY PRN
Status: DISCONTINUED | OUTPATIENT
Start: 2019-12-04 | End: 2019-12-05

## 2019-12-04 RX ADMIN — DEXTROSE 5 MILLION UNITS: 50 INJECTION, SOLUTION INTRAVENOUS at 12:12

## 2019-12-04 RX ADMIN — Medication 10 ML/HR: at 08:12

## 2019-12-04 RX ADMIN — Medication 2 MILLI-UNITS/MIN: at 01:12

## 2019-12-04 RX ADMIN — DEXTROSE 2.5 MILLION UNITS: 50 INJECTION, SOLUTION INTRAVENOUS at 08:12

## 2019-12-04 RX ADMIN — PROCHLORPERAZINE MALEATE 10 MG: 5 TABLET ORAL at 09:12

## 2019-12-04 RX ADMIN — LIDOCAINE HYDROCHLORIDE,EPINEPHRINE BITARTRATE 3 ML: 15; .005 INJECTION, SOLUTION EPIDURAL; INFILTRATION; INTRACAUDAL; PERINEURAL at 08:12

## 2019-12-04 RX ADMIN — DEXTROSE 2.5 MILLION UNITS: 50 INJECTION, SOLUTION INTRAVENOUS at 04:12

## 2019-12-04 RX ADMIN — SODIUM CHLORIDE, SODIUM LACTATE, POTASSIUM CHLORIDE, AND CALCIUM CHLORIDE: .6; .31; .03; .02 INJECTION, SOLUTION INTRAVENOUS at 06:12

## 2019-12-04 RX ADMIN — SODIUM CHLORIDE, SODIUM LACTATE, POTASSIUM CHLORIDE, AND CALCIUM CHLORIDE: .6; .31; .03; .02 INJECTION, SOLUTION INTRAVENOUS at 12:12

## 2019-12-04 RX ADMIN — ONDANSETRON 8 MG: 8 TABLET, ORALLY DISINTEGRATING ORAL at 08:12

## 2019-12-04 NOTE — PROGRESS NOTES
"LABOR NOTE    Chelsie Green is a 29 y.o. 29 y.o.  at 37w5d GA who presented for IOL 2/2 gHTN    S: Resident to bedside for routine cervical exam. Complaints: None. Epidural working well.     O: BP (!) 123/97   Pulse 68   Temp 98.3 °F (36.8 °C) (Oral)   Resp 19   Ht 5' 4" (1.626 m)   Wt 77.1 kg (170 lb)   LMP 03/15/2019   SpO2 98%   Breastfeeding? No   BMI 29.18 kg/m²     FHT: Baseline 150, mod variability, + accels, + variable decels, Cat 2 tracing  CTX: Every 2-5  SVE: 2, AROM    A:   29 y.o.  at 37w5d, induction of labor     Active Hospital Problems    Diagnosis  POA    *Gestational hypertension, third trimester [O13.3]  Yes    Elevated blood pressure affecting pregnancy in third trimester, antepartum [O16.3]  Yes      Resolved Hospital Problems   No resolved problems to display.     P:  Pause Active labor management:  Pause Pitocin augmentation per protocol  Continue close maternal and fetal monitoring  Recheck 2 hours or sooner if needed.    Timeline:   Admitted with new diagnosis of GHTN. Labs reviewed, no PreE derangements.   1300: pit started, balloon placed per .  1330: Decel, pit pause, balloon out,   1530: pit restarted,   1730: , AROM    Jihan Sutton MD  OBGYN PGY-1      "

## 2019-12-04 NOTE — ED PROVIDER NOTES
Encounter Date: 2019       History     Chief Complaint   Patient presents with    Hypertension     Chelsie Green is a 29 y.o. F at 37w5d presents complaining of elevated BP at home on connected mom. 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 Barrios advised her to come to the [facility] for further evaluation.     This IUP is complicated by GBS uti during pregnancy.  Patient denies contractions, denies vaginal bleeding, reports LOF.   Fetal Movement: normal. Patient reports a headache, but denies visual disturbance or RUQ pain.           Review of patient's allergies indicates:  No Known Allergies  Past Medical History:   Diagnosis Date    Abnormal Pap smear of cervix     Heart murmur     evaluated as a child, told not to be a problem    Scoliosis     didn' t need brace     Past Surgical History:   Procedure Laterality Date    breast augmentation      NOSE SURGERY      correct fracture of nose    TONSILLECTOMY, ADENOIDECTOMY       Family History   Problem Relation Age of Onset    Breast cancer Neg Hx     Colon cancer Neg Hx     Ovarian cancer Neg Hx     Cancer Neg Hx      Social History     Tobacco Use    Smoking status: Never Smoker    Smokeless tobacco: Never Used   Substance Use Topics    Alcohol use: Not Currently     Alcohol/week: 0.0 standard drinks     Comment: once a week    Drug use: No     Review of Systems   Constitutional: Negative for chills and fever.   HENT: Negative for sore throat.    Eyes: Negative for photophobia and visual disturbance.   Respiratory: Negative for shortness of breath.    Cardiovascular: Negative for chest pain.   Gastrointestinal: Negative for abdominal pain and nausea.   Genitourinary: Negative for dysuria, flank pain, pelvic pain and vaginal bleeding.   Musculoskeletal: Negative for back pain.   Skin: Negative for rash.   Neurological: Positive for headaches (2-3/10 HA across front, prescription for fiorocet does  not do anything). Negative for weakness.   Hematological: Does not bruise/bleed easily.       Physical Exam     Initial Vitals   BP Pulse Resp Temp SpO2   12/04/19 0930 12/04/19 0930 12/04/19 0930 12/04/19 0930 12/04/19 0931   (!) 142/84 73 19 98.4 °F (36.9 °C) 99 %      MAP       --                Physical Exam    Vitals reviewed.  Constitutional: Vital signs are normal. She appears well-developed and well-nourished. Breathing: Normal.   HENT:   Head: Normocephalic and atraumatic.   Eyes: EOM are normal.   Neck: Normal range of motion.   Cardiovascular: Normal rate, intact distal pulses and normal pulses.   Pulmonary/Chest: Breath sounds normal.   Abdominal: Soft. She exhibits no distension. There is no tenderness (no RUQ pain). There is no rebound and no guarding.   Genitourinary: Uterus normal.   Musculoskeletal: Normal range of motion. She exhibits no edema or tenderness.   Neurological: She is alert and oriented to person, place, and time. She has normal strength.   Skin: Skin is warm and dry.   Psychiatric: Her behavior is normal.     OB LABOR EXAM:   Pre-Term Labor: No.   Membranes ruptured: No.   Method: Sterile vaginal exam per MD.       Dilatation: 3.   Station: -2.   Effacement: 80%.             ED Course   Obtain Fetal nonstress test (NST)  Date/Time: 12/4/2019 9:45 AM  Performed by: Manuela Sutton MD  Authorized by: Filomena Villaseñor MD     Nonstress Test:     Variability:  6-25 BPM    Decelerations:  None    Accelerations:  15 bpm    Baseline:  150  Biophysical Profile:     Nonstress Test Interpretation: reactive      Overall Impression:  Reassuring  Post-procedure:     Patient tolerance:  Patient tolerated the procedure well with no immediate complications      Labs Reviewed   COMPREHENSIVE METABOLIC PANEL - Abnormal; Notable for the following components:       Result Value    CO2 20 (*)     Albumin 3.1 (*)     All other components within normal limits   CBC W/ AUTO DIFFERENTIAL   PROTEIN /  CREATININE RATIO, URINE   TYPE & SCREEN          Imaging Results    None          Medical Decision Making:   ED Management:  Mild range blood pressures in [facility]  ABURTO complaints, on fiorcet did not take but no relief per patient  Given compazine 10 mg  No relief from compazine  UPC 0.16  Will admit for gHTN vs pre-eclamspia  Consented and vertex position   Cervix 3/80/-2  As patient has a mild headache with no relief, concern for pre-eclampsia with SF but patient states all her other headaches feel like this and she has had this headache off and on exactly the same for the last week.   Decision was made to give 2 fiorocet, if no relief, will diagnosis with pre-e with SF and start mag  If relief, will continue with gHTN diagnosis  Other:   I have discussed this case with another health care provider.       <> Summary of the Discussion: Dr. Villaseñor              Attending Attestation:   Physician Attestation Statement for Resident:  As the supervising MD   Physician Attestation Statement: I have personally seen and examined this patient.   I agree with the above history. -:   As the supervising MD I agree with the above PE.    As the supervising MD I agree with the above treatment, course, plan, and disposition.   -: Patient evaluated and found to be stable, agree with resident's assessment and plan.  I was personally present during the critical portions of the procedure(s) performed by the resident and was immediately available in the ED to provide services and assistance as needed during the entire procedure.  I have reviewed and agree with the residents interpretation of the following: lab data.  I have reviewed the following: old records at this facility.                    ED Course as of Dec 04 1106   Wed Dec 04, 2019   0937 NST reactive/reassuring, reports HA, labs sent, BP mild range.      [AR]   1048 HA persists after compazine, BP remains mild range. Rec delivery for GHTN/vs pre e    [AR]      ED Course User  Index  [AR] Filomena Villaseñor MD                Clinical Impression:       ICD-10-CM ICD-9-CM   1. Elevated blood pressure affecting pregnancy in third trimester, antepartum O16.3 642.33   2. 37 weeks gestation of pregnancy Z3A.37 V22.2         Disposition:   Disposition: Admitted  Condition: Stable      Jihan Sutton MD  OBGYN PGY-1               Manuela Sutton MD  Resident  12/04/19 1106       Manuela Sutton MD  Resident  12/04/19 1227       Filomena Villaseñor MD  12/04/19 1504

## 2019-12-04 NOTE — LETTER
December 6, 2019         [address]  [facility] [address]  Phone: [phone]       Date of Visit: 12/06/2019    To Whom It May Concern:    Sydney Almonte  was at [facility] on 12/06/2019. He may return to work/school on 12/9 with no restrictions. If you have any questions or concerns, or if I can be of further assistance, please do not hesitate to contact me.    Sincerely,      Isatu Mascorro MD

## 2019-12-04 NOTE — PROGRESS NOTES
"LABOR NOTE    Chelsie Green is a 29 y.o. 29 y.o.  at 37w5d GA who presented for IOL 2/2 gHTN    S: Resident to bedside for routine cervical exam. Complaints: None. Epidural working well.     O: /79   Pulse 69   Temp 98.3 °F (36.8 °C) (Oral)   Resp 19   Ht 5' 4" (1.626 m)   Wt 77.1 kg (170 lb)   LMP 03/15/2019   SpO2 99%   Breastfeeding? No   BMI 29.18 kg/m²     FHT: Baseline 130, mod variability, + accels, - decels, Cat 1 tracing  CTX: Every 2-5  SVE: /-2, membranes intact.    A:   29 y.o.  at 37w5d, induction of labor     Active Hospital Problems    Diagnosis  POA    *Gestational hypertension, third trimester [O13.3]  Yes    Elevated blood pressure affecting pregnancy in third trimester, antepartum [O16.3]  Yes      Resolved Hospital Problems   No resolved problems to display.     P:  Pause Active labor management:  Pause Pitocin augmentation per protocol  Continue close maternal and fetal monitoring  Recheck 2 hours or sooner if needed.    Timeline:   Admitted with new diagnosis of GHTN. Labs reviewed, no PreE derangements.   1300: pit started, balloon placed per .  1330: Decel, pit pause, balloon out, -2  1530: pit restarted, 2    Isatu Mascorro M.D.   OB/GYN  PGY-1    "

## 2019-12-04 NOTE — H&P
History & Physical                                                                   Obstetrics    Subjective:      HPI:  Chelsie Green is a 29 y.o.  at 37w5d who is admitted to Labor & Delivery for new diagnosis of gestational hypertension versus pre-eclampsia. BP has been elevated but not severely elevated. Pre E labs are pending. She also has a mild HA that was unrelieved by compazine in the ED. Patient has had HAs throughout this pregnancy, for which she typically takes Fioricet.     This pregnancy is complicated by history of GBS UTI this pregnancy, as well as gestational thrombocytopenia earlier in the third trimester, which is now resolved, and HAs as mentioned.     Currently patient has no complaints but for 2/10 HA. She reports active fetal movement, denies painful contractions, vaginal bleeding, and leakage of fluid from the vagina.     OB History    Para Term  AB Living   1 0 0 0 0 0   SAB TAB Ectopic Multiple Live Births   0 0 0 0 0      # Outcome Date GA Lbr Isidoro/2nd Weight Sex Delivery Anes PTL Lv   1 Current              Past Medical History:   Diagnosis Date    Abnormal Pap smear of cervix     Heart murmur     evaluated as a child, told not to be a problem    Scoliosis     didn' t need brace      Past Surgical History:   Procedure Laterality Date    breast augmentation      NOSE SURGERY      correct fracture of nose    TONSILLECTOMY, ADENOIDECTOMY         Allergies: Review of patient's allergies indicates:  No Known Allergies    Medications Prior to Admission   Medication Sig Dispense Refill Last Dose    butalbital-acetaminophen-caffeine -40 mg (FIORICET, ESGIC) -40 mg per tablet Take 1 tablet by mouth every 4 (four) hours as needed for Headaches. 20 tablet 0     ivermectin (SOOLANTRA) 1 % Crea APPLY TOPICALLY TWICE DAILY to affected area 45 g 2 Taking    prenatal 105-iron-folic ac-dha 30 mg iron- 1.4 mg-300 mg Cmpk Take by mouth.    Taking     Social History     Socioeconomic History    Marital status:      Spouse name: Not on file    Number of children: Not on file    Years of education: Not on file    Highest education level: Not on file   Occupational History    Not on file   Social Needs    Financial resource strain: Not on file    Food insecurity:     Worry: Not on file     Inability: Not on file    Transportation needs:     Medical: Not on file     Non-medical: Not on file   Tobacco Use    Smoking status: Never Smoker    Smokeless tobacco: Never Used   Substance and Sexual Activity    Alcohol use: Not Currently     Alcohol/week: 0.0 standard drinks     Comment: once a week    Drug use: No    Sexual activity: Yes     Partners: Male     Birth control/protection: None   Lifestyle    Physical activity:     Days per week: Not on file     Minutes per session: Not on file    Stress: Not on file   Relationships    Social connections:     Talks on phone: Not on file     Gets together: Not on file     Attends Qu[employer] service: Not on file     Active member of club or organization: Not on file     Attends meetings of clubs or organizations: Not on file     Relationship status: Not on file   Other Topics Concern    Not on file   Social History Narrative    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   HENT: Negative for sore throat.    Eyes: Negative for visual disturbance.   Respiratory: Negative for shortness of breath.    Cardiovascular: Negative for chest pain.   Gastrointestinal: Negative for abdominal pain and nausea.   Genitourinary: Negative for dysuria, pelvic pain and vaginal bleeding.   Musculoskeletal: Negative for back pain.   Skin: Negative for rash.   Neurological: Positive  for headaches (2-3/10 HA across front). Negative for weakness.   Hematological: Does not bruise/bleed easily.     Objective:      Temp:  [98.3 °F (36.8 °C)-98.4 °F (36.9 °C)] 98.3 °F (36.8 °C)  Pulse:  [] 77  Resp:  [19] 19  SpO2:  [96 %-99 %] 97 %  BP: (118-149)/(69-91) 128/73     General:   Awake, alert, in no acute distress. Appears stated age.    Lungs:   Quiet, unlabored breathing. No signs of respiratory distress. No wheezing.    Heart:   Regular rate, no peripheral cyanosis, peripheral pulses present and symmetric bilaterally.    Abdomen:  Gravid, size consistent with gestational age. Not distended. Soft, non-tender.    Extremities No swelling in hands or upper extremities. No lower extremity edema.    FHT: Baseline 140 BPM, accelerations present, decelerations absent, moderate BTBV (Category 1)                 TOCO: Every 3-6 minutes   Presentations: Vertex presentation confirmed with bedside US   Cervix: 3/70/-2, soft, mid-position     Lab Review:  Blood Type: A POS  GBBS: positive   Rubella: Immune  RPR: Non-reactive  HIV: Negative  Hepatitis B: Negative     Recent Labs   Lab 19  0940   WBC 9.63   RBC 4.83   HGB 13.7   HCT 40.7      MCV 84   MCH 28.4   MCHC 33.7     Assessment:     29 y.o.  at 37w5d with a pregnancy complicated by chronic headaches and gestational hypertension admitted for induction of labor due to newly elevated blood pressure.     Plan:     1. Induction of Labor  - Admit to Labor and Delivery unit  - On-call OB and anesthesia staff notified  - Risks, benefits, alternatives and possible complications of vaginal, operative vaginal, and  delivery, as well as administration of blood products have been discussed in detail with the patient. Consents signed and in chart.   - Draw CBC and T&S  - Insert peripheral IV  - Begin active management with pitocin  - Epidural per Anesthesia when requested by patient   - Recheck in 2 hrs or PRN    2. Chronic HA in  pregnancy  - Usually relieved by Fioricet  - Patient is an excellent historian and is confident this is one of her typical HA  - Discussed HA as a SF of preE. Will attempt treatment with Fioricet and if HA does not resolve will consider as a SF and start MgSO4 for preE with SF    3. Gestational hypertension  - PreE labs pending, CBC shows ptl WNL at 199  - Mild HA as above, treating with fioricet now  - Negative ROS for preE    4. H/o GBS UTI  - PCN in labor    Post-Partum Hemorrhage risk - low    Raymond Caraballo M.D. PGY-3  Obstetrics & Gynecology

## 2019-12-04 NOTE — ANESTHESIA PREPROCEDURE EVALUATION
2019  [facility]  Anesthesia Pre-Operative Evaluation         Patient Name: Chelsie Green  YOB: 1990  MRN: [id number]    SUBJECTIVE:     Pre-operative evaluation for * No surgery found *     2019    Chelsie Green is a 29 y.o. female  at 37w5d here for IOL 2/2 gHTN    LDA: None documented.    Prev airway: None documented.    Drips: None documented.      Patient Active Problem List   Diagnosis    ASCUS (atypical squamous cells of undetermined significance) on Pap smear    Chronic right-sided low back pain with sciatica    Episcleritis of right eye    Group B Streptococcus urinary tract infection affecting pregnancy in first trimester    Supervision of normal first pregnancy in third trimester    Elevated blood pressure affecting pregnancy in third trimester, antepartum       Review of patient's allergies indicates:  No Known Allergies    Current Inpatient Medications:   butalbital-acetaminophen-caffeine -40 mg  2 tablet Oral Once    pencillin G potassium IVPB  2.5 Million Units Intravenous Q4H    pencillin G potassium IVPB  5 Million Units Intravenous Once       No current facility-administered medications on file prior to encounter.      Current Outpatient Medications on File Prior to Encounter   Medication Sig Dispense Refill    butalbital-acetaminophen-caffeine -40 mg (FIORICET, ESGIC) -40 mg per tablet Take 1 tablet by mouth every 4 (four) hours as needed for Headaches. 20 tablet 0    ivermectin (SOOLANTRA) 1 % Crea APPLY TOPICALLY TWICE DAILY to affected area 45 g 2    prenatal 105-iron-folic ac-dha 30 mg iron- 1.4 mg-300 mg Cmpk Take by mouth.         Past Surgical History:   Procedure Laterality Date    breast augmentation      NOSE SURGERY      correct fracture of nose    TONSILLECTOMY, ADENOIDECTOMY         Social  History     Socioeconomic History    Marital status:      Spouse name: Not on file    Number of children: Not on file    Years of education: Not on file    Highest education level: Not on file   Occupational History    Not on file   Social Needs    Financial resource strain: Not on file    Food insecurity:     Worry: Not on file     Inability: Not on file    Transportation needs:     Medical: Not on file     Non-medical: Not on file   Tobacco Use    Smoking status: Never Smoker    Smokeless tobacco: Never Used   Substance and Sexual Activity    Alcohol use: Not Currently     Alcohol/week: 0.0 standard drinks     Comment: once a week    Drug use: No    Sexual activity: Yes     Partners: Male     Birth control/protection: None   Lifestyle    Physical activity:     Days per week: Not on file     Minutes per session: Not on file    Stress: Not on file   Relationships    Social connections:     Talks on phone: Not on file     Gets together: Not on file     Attends Sikh service: Not on file     Active member of club or organization: Not on file     Attends meetings of clubs or organizations: Not on file     Relationship status: Not on file   Other Topics Concern    Not on file   Social History Narrative    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    Diagnostic Studies: No relevant studies.    EKG: No recent  studies available.    2D ECHO:  Results for orders placed or performed during the hospital encounter of 12/18/15   2D echo with color flow doppler   Result Value Ref Range    QEF 65 55 - 65    Diastolic Dysfunction No     Est. PA Systolic Pressure 21     Tricuspid Valve Regurgitation MILD          ASSESSMENT/PLAN:         Anesthesia Evaluation    I have reviewed the Patient Summary Reports.    I have reviewed the Nursing Notes.   I have reviewed the Medications.     Review of Systems  Anesthesia Hx:  Denies Hx of Anesthetic complications  History of prior surgery of interest to airway management or planning: Previous anesthesia: General Denies Family Hx of Anesthesia complications.   Denies Personal Hx of Anesthesia complications.   Social:  Non-Smoker    Hematology/Oncology:     Oncology Normal    -- Anemia:   EENT/Dental:EENT/Dental Normal   Cardiovascular:  Cardiovascular Normal     Pulmonary:  Pulmonary Normal    Hepatic/GI:  Hepatic/GI Normal  Denies GERD.    Neurological:  Neurology Normal    Endocrine:  Endocrine Normal Denies Diabetes.        Physical Exam  General:  Well nourished    Airway/Jaw/Neck:  Airway Findings: Mouth Opening: Normal Tongue: Normal  General Airway Assessment: Adult  Mallampati: II       Chest/Lungs:  Chest/Lungs Clear    Heart/Vascular:  Heart Findings: Normal       Mental Status:  Mental Status Findings:  Cooperative, Alert and Oriented         Anesthesia Plan  Type of Anesthesia, risks & benefits discussed:  Anesthesia Type:  CSE, epidural, general, spinal, MAC  Patient's Preference:   Intra-op Monitoring Plan: standard ASA monitors  Intra-op Monitoring Plan Comments:   Post Op Pain Control Plan: multimodal analgesia  Post Op Pain Control Plan Comments:   Induction:   IV  Beta Blocker:  Patient is not currently on a Beta-Blocker (No further documentation required).       Informed Consent: Patient understands risks and agrees with Anesthesia plan.  Questions answered. Anesthesia  consent signed with patient.  ASA Score: 2     Day of Surgery Review of History & Physical:    H&P update referred to the surgeon.         Ready For Surgery From Anesthesia Perspective.

## 2019-12-04 NOTE — PROGRESS NOTES
"LABOR NOTE    Chelsie Green is a 29 y.o. 29 y.o.  at 37w5d GA who presented for IOL.    S: Resident and chief resident to bedside due to FHR deceleration. Patient started on pit 10 minutes prior and had been laying on her back for cervical balloon placement.  Complaints: None. Epidural working well.     O: /79   Pulse 69   Temp 98.3 °F (36.8 °C) (Oral)   Resp 19   Ht 5' 4" (1.626 m)   Wt 77.1 kg (170 lb)   LMP 03/15/2019   SpO2 99%   Breastfeeding? No   BMI 29.18 kg/m²     FHT: Baseline rate - 140-150 BPM. Moderate BTBV. One prolonged deceleration into 80s-90s lasting 4 min with mod btbv retained throughout and return to baseline. No other decelerations.   Category 2 tracing.  CTX: Every 2-4 minutes but tetanic contraction noted during deceleration  SVE: 80/-2, membranes intact.    A:   29 y.o.  at 37w5d, induction of labor with deceleration but overall fetal heart tracing.     Active Hospital Problems    Diagnosis  POA    Elevated blood pressure affecting pregnancy in third trimester, antepartum [O16.3]  Yes      Resolved Hospital Problems   No resolved problems to display.     P:  Pause Active labor management:  Pause Pitocin augmentation per protocol  Continue close maternal and fetal monitoring  Recheck 2 hours or sooner if needed.    Timeline:   Admitted with new diagnosis of GHTN. Labs reviewed, no PreE derangements.   1300: pit started, bal;loon placed per .  1330: Decel, pit pause, balloon out, 80/-2    Raymond Caraballo M.D.  Obstetrics & Gynecology  PGY-3  "

## 2019-12-04 NOTE — ED NOTES
Patient presents to OB ED with complaints of hypertension. Pt reports positive fetal movements, denies leakage of fluid and denies vaginal bleeding. Pt placed in OB ED room, urine obtained and pt placed on monitor.

## 2019-12-05 LAB
BASOPHILS # BLD AUTO: 0.03 K/UL (ref 0–0.2)
BASOPHILS NFR BLD: 0.2 % (ref 0–1.9)
DIFFERENTIAL METHOD: ABNORMAL
EOSINOPHIL # BLD AUTO: 0 K/UL (ref 0–0.5)
EOSINOPHIL NFR BLD: 0.1 % (ref 0–8)
ERYTHROCYTE [DISTWIDTH] IN BLOOD BY AUTOMATED COUNT: 13.1 % (ref 11.5–14.5)
HCT VFR BLD AUTO: 31.4 % (ref 37–48.5)
HGB BLD-MCNC: 10.6 G/DL (ref 12–16)
IMM GRANULOCYTES # BLD AUTO: 0.06 K/UL (ref 0–0.04)
IMM GRANULOCYTES NFR BLD AUTO: 0.4 % (ref 0–0.5)
LYMPHOCYTES # BLD AUTO: 2.7 K/UL (ref 1–4.8)
LYMPHOCYTES NFR BLD: 19 % (ref 18–48)
MCH RBC QN AUTO: 28.6 PG (ref 27–31)
MCHC RBC AUTO-ENTMCNC: 33.8 G/DL (ref 32–36)
MCV RBC AUTO: 85 FL (ref 82–98)
MONOCYTES # BLD AUTO: 0.9 K/UL (ref 0.3–1)
MONOCYTES NFR BLD: 6.1 % (ref 4–15)
NEUTROPHILS # BLD AUTO: 10.4 K/UL (ref 1.8–7.7)
NEUTROPHILS NFR BLD: 74.2 % (ref 38–73)
NRBC BLD-RTO: 0 /100 WBC
PLATELET # BLD AUTO: 146 K/UL (ref 150–350)
PMV BLD AUTO: 11 FL (ref 9.2–12.9)
RBC # BLD AUTO: 3.71 M/UL (ref 4–5.4)
WBC # BLD AUTO: 13.99 K/UL (ref 3.9–12.7)

## 2019-12-05 PROCEDURE — 72200005 HC VAGINAL DELIVERY LEVEL II

## 2019-12-05 PROCEDURE — 63600175 PHARM REV CODE 636 W HCPCS: Performed by: STUDENT IN AN ORGANIZED HEALTH CARE EDUCATION/TRAINING PROGRAM

## 2019-12-05 PROCEDURE — 59400 OBSTETRICAL CARE: CPT | Mod: ,,, | Performed by: OBSTETRICS & GYNECOLOGY

## 2019-12-05 PROCEDURE — 11000001 HC ACUTE MED/SURG PRIVATE ROOM

## 2019-12-05 PROCEDURE — 59400 PR FULL ROUT OBSTE CARE,VAGINAL DELIV: ICD-10-PCS | Mod: ,,, | Performed by: OBSTETRICS & GYNECOLOGY

## 2019-12-05 PROCEDURE — 85025 COMPLETE CBC W/AUTO DIFF WBC: CPT

## 2019-12-05 PROCEDURE — 25000003 PHARM REV CODE 250: Performed by: STUDENT IN AN ORGANIZED HEALTH CARE EDUCATION/TRAINING PROGRAM

## 2019-12-05 PROCEDURE — 36415 COLL VENOUS BLD VENIPUNCTURE: CPT

## 2019-12-05 RX ORDER — MISOPROSTOL 200 UG/1
TABLET ORAL
Status: DISPENSED
Start: 2019-12-05 | End: 2019-12-05

## 2019-12-05 RX ORDER — IBUPROFEN 600 MG/1
600 TABLET ORAL EVERY 6 HOURS
Status: DISCONTINUED | OUTPATIENT
Start: 2019-12-05 | End: 2019-12-06 | Stop reason: HOSPADM

## 2019-12-05 RX ORDER — DIPHENHYDRAMINE HYDROCHLORIDE 50 MG/ML
25 INJECTION INTRAMUSCULAR; INTRAVENOUS EVERY 4 HOURS PRN
Status: DISCONTINUED | OUTPATIENT
Start: 2019-12-05 | End: 2019-12-06 | Stop reason: HOSPADM

## 2019-12-05 RX ORDER — HYDROCORTISONE 25 MG/G
CREAM TOPICAL 3 TIMES DAILY PRN
Status: DISCONTINUED | OUTPATIENT
Start: 2019-12-05 | End: 2019-12-06 | Stop reason: HOSPADM

## 2019-12-05 RX ORDER — CARBOPROST TROMETHAMINE 250 UG/ML
INJECTION, SOLUTION INTRAMUSCULAR
Status: DISPENSED
Start: 2019-12-05 | End: 2019-12-05

## 2019-12-05 RX ORDER — DOCUSATE SODIUM 100 MG/1
200 CAPSULE, LIQUID FILLED ORAL 2 TIMES DAILY PRN
Status: DISCONTINUED | OUTPATIENT
Start: 2019-12-05 | End: 2019-12-06 | Stop reason: HOSPADM

## 2019-12-05 RX ORDER — ACETAMINOPHEN 325 MG/1
650 TABLET ORAL EVERY 6 HOURS PRN
Status: DISCONTINUED | OUTPATIENT
Start: 2019-12-05 | End: 2019-12-06 | Stop reason: HOSPADM

## 2019-12-05 RX ORDER — DIPHENHYDRAMINE HCL 25 MG
25 CAPSULE ORAL EVERY 4 HOURS PRN
Status: DISCONTINUED | OUTPATIENT
Start: 2019-12-05 | End: 2019-12-06 | Stop reason: HOSPADM

## 2019-12-05 RX ORDER — SODIUM CITRATE AND CITRIC ACID MONOHYDRATE 334; 500 MG/5ML; MG/5ML
30 SOLUTION ORAL ONCE
Status: DISCONTINUED | OUTPATIENT
Start: 2019-12-05 | End: 2019-12-06

## 2019-12-05 RX ORDER — OXYTOCIN/RINGER'S LACTATE 30/500 ML
95 PLASTIC BAG, INJECTION (ML) INTRAVENOUS CONTINUOUS
Status: ACTIVE | OUTPATIENT
Start: 2019-12-05 | End: 2019-12-05

## 2019-12-05 RX ORDER — HYDROCODONE BITARTRATE AND ACETAMINOPHEN 5; 325 MG/1; MG/1
1 TABLET ORAL EVERY 4 HOURS PRN
Status: DISCONTINUED | OUTPATIENT
Start: 2019-12-05 | End: 2019-12-06 | Stop reason: HOSPADM

## 2019-12-05 RX ORDER — FAMOTIDINE 10 MG/ML
20 INJECTION INTRAVENOUS ONCE
Status: DISCONTINUED | OUTPATIENT
Start: 2019-12-05 | End: 2019-12-06

## 2019-12-05 RX ORDER — OXYTOCIN 10 [USP'U]/ML
INJECTION, SOLUTION INTRAMUSCULAR; INTRAVENOUS
Status: DISPENSED
Start: 2019-12-05 | End: 2019-12-05

## 2019-12-05 RX ORDER — ONDANSETRON 8 MG/1
8 TABLET, ORALLY DISINTEGRATING ORAL EVERY 8 HOURS PRN
Status: DISCONTINUED | OUTPATIENT
Start: 2019-12-05 | End: 2019-12-06 | Stop reason: HOSPADM

## 2019-12-05 RX ORDER — METHYLERGONOVINE MALEATE 0.2 MG/ML
INJECTION INTRAVENOUS
Status: DISPENSED
Start: 2019-12-05 | End: 2019-12-05

## 2019-12-05 RX ORDER — FENTANYL/BUPIVACAINE/NS/PF 2MCG/ML-.1
PLASTIC BAG, INJECTION (ML) INJECTION CONTINUOUS
Status: DISCONTINUED | OUTPATIENT
Start: 2019-12-05 | End: 2019-12-06

## 2019-12-05 RX ORDER — OXYTOCIN/RINGER'S LACTATE 30/500 ML
95 PLASTIC BAG, INJECTION (ML) INTRAVENOUS ONCE
Status: DISCONTINUED | OUTPATIENT
Start: 2019-12-05 | End: 2019-12-06

## 2019-12-05 RX ORDER — DIPHENOXYLATE HYDROCHLORIDE AND ATROPINE SULFATE 2.5; .025 MG/1; MG/1
1 TABLET ORAL 4 TIMES DAILY PRN
Status: DISCONTINUED | OUTPATIENT
Start: 2019-12-05 | End: 2019-12-06 | Stop reason: HOSPADM

## 2019-12-05 RX ORDER — HYDROCODONE BITARTRATE AND ACETAMINOPHEN 10; 325 MG/1; MG/1
1 TABLET ORAL EVERY 4 HOURS PRN
Status: DISCONTINUED | OUTPATIENT
Start: 2019-12-05 | End: 2019-12-06 | Stop reason: HOSPADM

## 2019-12-05 RX ORDER — PRENATAL WITH FERROUS FUM AND FOLIC ACID 3080; 920; 120; 400; 22; 1.84; 3; 20; 10; 1; 12; 200; 27; 25; 2 [IU]/1; [IU]/1; MG/1; [IU]/1; MG/1; MG/1; MG/1; MG/1; MG/1; MG/1; UG/1; MG/1; MG/1; MG/1; MG/1
1 TABLET ORAL DAILY
Status: DISCONTINUED | OUTPATIENT
Start: 2019-12-05 | End: 2019-12-06 | Stop reason: HOSPADM

## 2019-12-05 RX ORDER — SIMETHICONE 80 MG
1 TABLET,CHEWABLE ORAL EVERY 6 HOURS PRN
Status: DISCONTINUED | OUTPATIENT
Start: 2019-12-05 | End: 2019-12-06 | Stop reason: HOSPADM

## 2019-12-05 RX ADMIN — PRENATAL VIT W/ FE FUMARATE-FA TAB 27-0.8 MG 1 TABLET: 27-0.8 TAB at 08:12

## 2019-12-05 RX ADMIN — IBUPROFEN 600 MG: 600 TABLET, FILM COATED ORAL at 12:12

## 2019-12-05 RX ADMIN — DOCUSATE SODIUM 200 MG: 100 CAPSULE, LIQUID FILLED ORAL at 09:12

## 2019-12-05 RX ADMIN — IBUPROFEN 600 MG: 600 TABLET, FILM COATED ORAL at 07:12

## 2019-12-05 RX ADMIN — DEXTROSE 2.5 MILLION UNITS: 50 INJECTION, SOLUTION INTRAVENOUS at 12:12

## 2019-12-05 RX ADMIN — Medication 95 MILLI-UNITS/MIN: at 03:12

## 2019-12-05 RX ADMIN — IBUPROFEN 600 MG: 600 TABLET, FILM COATED ORAL at 05:12

## 2019-12-05 NOTE — PROGRESS NOTES
"LABOR NOTE    Chelsie Green is a 29 y.o. 29 y.o.  at 37w5d GA who presented for IOL 2/2 gHTN    S: Resident to bedside for routine cervical exam. Complaints: None. Epidural working well.     O: /64   Pulse 85   Temp 98.4 °F (36.9 °C) (Oral)   Resp 16   Ht 5' 4" (1.626 m)   Wt 77.1 kg (170 lb)   LMP 03/15/2019   SpO2 98%   Breastfeeding? No   BMI 29.18 kg/m²     FHT: Baseline 150, mod variability, + accels, + variable decels, Cat 2  CTX: Every 2-3  SVE: 10/100/+1    A:   29 y.o.  at 37w5d, induction of labor     Active Hospital Problems    Diagnosis  POA    *Gestational hypertension, third trimester [O13.3]  Yes    Elevated blood pressure affecting pregnancy in third trimester, antepartum [O16.3]  Yes      Resolved Hospital Problems   No resolved problems to display.     P:  Good maternal effort with practice pushes  Set up to push, Dr. Barrios on her way in  Pause Pitocin augmentation per protocol  Continue close maternal and fetal monitoring  Recheck 2 hours or sooner if needed.    Timeline:   Admitted with new diagnosis of GHTN. Labs reviewed, no PreE derangements.   1300: pit started, balloon placed per .  1330: Decel, pit pause, balloon out, /-2  1530: pit restarted, 2  1730: /-2, AROM  1930: /-1  2130: /-1, IUPC placed  0130: 10/100/+1    Domitila Serrano MD  OBGYN, PGY-2      "

## 2019-12-05 NOTE — PROGRESS NOTES
"LABOR NOTE    Chelsie Green is a 29 y.o. 29 y.o.  at 37w5d GA who presented for IOL 2/2 gHTN    S: Resident to bedside for routine cervical exam. Complaints: None. Epidural working well.     O: /64   Pulse 77   Temp 98.4 °F (36.9 °C) (Oral)   Resp 18   Ht 5' 4" (1.626 m)   Wt 77.1 kg (170 lb)   LMP 03/15/2019   SpO2 97%   Breastfeeding? No   BMI 29.18 kg/m²     FHT: Baseline 150, mod variability, + accels, + occasional late decels, Cat 1 with periods of Cat 2   CTX: Every 2-3  SVE: /-1, s/p AROM, pit @ 10 mU    A:   29 y.o.  at 37w5d, induction of labor     Active Hospital Problems    Diagnosis  POA    *Gestational hypertension, third trimester [O13.3]  Yes    Elevated blood pressure affecting pregnancy in third trimester, antepartum [O16.3]  Yes      Resolved Hospital Problems   No resolved problems to display.     P:  Pause Active labor management:  Pause Pitocin augmentation per protocol  Continue close maternal and fetal monitoring  Recheck 2 hours or sooner if needed.    Timeline:   Admitted with new diagnosis of GHTN. Labs reviewed, no PreE derangements.   1300: pit started, balloon placed per .  1330: Decel, pit pause, balloon out, /-2  1530: pit restarted, 2  1730: /-2, AROM  1930: /-1  2130: /-1, IUPC placed    Abby Leonard MD, PhD  OBGYN, PGY-4       "

## 2019-12-05 NOTE — PLAN OF CARE
Pt to utilize nipple stimulation prior to latching baby.  If baby unable to sustain latch, Pt to hand express and provide ebm by spoon. Pt to feed at least 8 or more times in a twenty-four period.

## 2019-12-05 NOTE — LACTATION NOTE
12/05/19 [facility]   Maternal Assessment   Breast Shape round;Bilateral:   Breast Density soft;Bilateral:   Areola Bilateral:;elastic   Nipples Bilateral:;short   Maternal Infant Feeding   Maternal Emotional State relaxed   Infant Positioning cross-cradle   Latch Assistance yes   Additional Documentation   (short brief sucks)   LC reviewed basic breastfeeding education including tracking voids/stools and feeding at least 8 or more in 24 hours. Pt agreeable to receiving assistance with positioning. LC encouraged mom to utilize nipple stimulation prior to latching. Baby opening wide and will grasp nipple; however, unable to sustain latch.   provided education on hand expression. Pt able to express 8 ml rotating breast and provided by spoon/finger feeding. Baby tongue thrusting and slowly engaged in brief periods of sucking during finger feeding.  placed name and number on board and encouraged Pt to contact  for next feeding to assist with latch.

## 2019-12-05 NOTE — ANESTHESIA PROCEDURE NOTES
Epidural    Patient location during procedure: OB   Reason for block: primary anesthetic   Diagnosis: IUP   Start time: 12/4/2019 8:46 PM  Timeout: 12/4/2019 8:45 PM  End time: 12/4/2019 8:56 PM  Surgery related to: Vaginal Delivery    Staffing  Performing Provider: Cat Lindo MD  Authorizing Provider: Sonia Ocampo MD        Preanesthetic Checklist  Completed: patient identified, site marked, surgical consent, pre-op evaluation, timeout performed, IV checked, risks and benefits discussed, monitors and equipment checked, anesthesia consent given, hand hygiene performed and patient being monitored  Preparation  Patient position: sitting  Prep: ChloraPrep  Patient monitoring: Pulse Ox  Epidural  Skin Anesthetic: lidocaine 1%  Skin Wheal: 3 mL  Administration type: continuous  Approach: midline  Interspace: L3-4    Injection technique: JOVON saline  Needle and Epidural Catheter  Needle type: Tuohy   Needle gauge: 17  Needle length: 3.5 inches  Needle insertion depth: 5 cm  Catheter type: springwound  Catheter size: 19 G  Catheter at skin depth: 10 cm  Test dose: 3 mL of lidocaine 1.5% with Epi 1-to-200,000  Additional Documentation: incremental injection, negative aspiration for heme and CSF, no paresthesia on injection, no signs/symptoms of IV or SA injection, no significant pain on injection and no significant complaints from patient  Needle localization: anatomical landmarks  Medications:  Volume per aspiration: 5 mL  Time between aspirations: 5 minutes  Assessment  Ease of block: easy  Patient's tolerance of the procedure: comfortable throughout block and no complaintsNo inadvertent dural puncture with Tuohy.  Dural puncture performed with spinal needle.

## 2019-12-05 NOTE — L&D DELIVERY NOTE
[facility]  Vaginal Delivery   Operative Note    SUMMARY      cephalic after approximately 45 minutes of maternal pushing.  Under epidural anesthesia.  Infant delivered OA over intact perineum.  Male infant also tolerated the delivery well and was placed on mothers abdomen for skin to skin and bulb suctioning performed.  Cord clamped and cut.  Umbilical arterial gas and venous blood obtained.  Midline posterior 2nd degree laceration with right labial laceration.   Repaired in the usual fashion with 2-0 and 3-0 vicryl until hemostatic.  Placenta delivered spontaneously and IV pitocin given.  Uterine tone noted. No cervical lacerations..  EBL 200cc  Staff present for entire procedure.    Patient tolerated delivery well. Sponge needle and lap counted correctly x2.    Indications:  (spontaneous vaginal delivery)  Pregnancy complicated by:   Patient Active Problem List   Diagnosis    ASCUS (atypical squamous cells of undetermined significance) on Pap smear    Chronic right-sided low back pain with sciatica    Episcleritis of right eye    Group B Streptococcus urinary tract infection affecting pregnancy in first trimester    Supervision of normal first pregnancy in third trimester    Elevated blood pressure affecting pregnancy in third trimester, antepartum    Gestational hypertension, third trimester     (spontaneous vaginal delivery)     Admitting GA: 37w6d    Delivery Information for Trevor Green    Birth information:  YOB: 2018   Time of birth: 2:20 AM   Sex: male   Head Delivery Date/Time: 2019  2:20 AM   Delivery type: Vaginal, Spontaneous   Gestational Age: 37w6d    Delivery Providers    Delivering clinician:  Shannon Barrios MD   Provider Role    Abby Leonard MD Resident    Marivel Cho, RN Delivery Nurse    Igor Schaeffer RN Registered Nurse    Amirah Blount, RN Charge Nurse    Ellen Fry ST Surgical Tech             Measurements    Weight:  3090 g  Length:  0.527 m         Apgars    Living status:  Living  Apgars:   1 min.:   5 min.:   10 min.:   15 min.:   20 min.:     Skin color:          Heart rate:          Reflex irritability:          Muscle tone:          Respiratory effort:          Total:                 Operative Delivery    Forceps attempted?:  No  Vacuum extractor attempted?:  No         Shoulder Dystocia    Shoulder dystocia present?:  No           Presentation    Presentation:  Vertex  Position:  Occiput Anterior           Interventions/Resuscitation    Method:  Bulb Suctioning, Tactile Stimulation       Cord    Vessels:  3 vessels  Complications:  None  Delayed Cord Clamping?:  Yes  Cord Clamped Date/Time:  2019  2:21 AM  Cord Blood Disposition:  Sent with Baby  Gases Sent?:  No  Stem Cell Collection (by MD):  No       Placenta    Placenta delivery date/time:  2019 022  Placenta removal:  Spontaneous  Placenta appearance:  Intact  Placenta disposition:  discarded           Labor Events:       labor: No     Labor Onset Date/Time:         Dilation Complete Date/Time:         Start Pushing Date/Time:         Start Pushing Date/Time:       Rupture Date/Time: 19  171         Rupture type:           Fluid Amount:        Fluid Color: Clear      Fluid Odor:        Membrane Status: ARM (Artificial Rupture)               steroids: None     Antibiotics given for GBS: Yes     Induction: oxytocin     Indications for induction:  Hypertension     Augmentation: oxytocin     Indications for augmentation: Hypertension     Labor complications: None     Additional complications:          Cervical ripening:                     Delivery:      Episiotomy: None     Indication for Episiotomy:       Perineal Lacerations: 1st Repaired:  Yes   Periurethral Laceration:   Repaired:     Labial Laceration:   Repaired:     Sulcus Laceration:   Repaired:     Vaginal Laceration:   Repaired:     Cervical  Laceration:   Repaired:     Repair suture:       Repair # of packets: 2     Last Value - EBL - Nursing (mL): 250     Sum - EBL - Nursing (mL): 250     Last Value - EBL - Anesthesia (mL):      Calculated QBL (mL):        Vaginal Sweep Performed: Yes     Surgicount Correct: Yes       Other providers:       Anesthesia    Method:  Epidural          Details (if applicable):  Trial of Labor      Categorization:      Priority:     Indications for :     Incision Type:       Additional  information:  Forceps:    Vacuum:    Breech:    Observed anomalies    Other (Comments):         Abby Leonard MD, PhD  OBGYN, PGY-4

## 2019-12-06 VITALS
OXYGEN SATURATION: 98 % | HEIGHT: 64 IN | DIASTOLIC BLOOD PRESSURE: 58 MMHG | RESPIRATION RATE: 18 BRPM | BODY MASS INDEX: 29.02 KG/M2 | TEMPERATURE: 98 F | SYSTOLIC BLOOD PRESSURE: 124 MMHG | HEART RATE: 67 BPM | WEIGHT: 170 LBS

## 2019-12-06 LAB
RUBV IGG SER-ACNC: 38.9 IU/ML
RUBV IGG SER-IMP: REACTIVE

## 2019-12-06 PROCEDURE — 99024 POSTOP FOLLOW-UP VISIT: CPT | Mod: ,,, | Performed by: OBSTETRICS & GYNECOLOGY

## 2019-12-06 PROCEDURE — 99024 PR POST-OP FOLLOW-UP VISIT: ICD-10-PCS | Mod: ,,, | Performed by: OBSTETRICS & GYNECOLOGY

## 2019-12-06 PROCEDURE — 25000003 PHARM REV CODE 250: Performed by: STUDENT IN AN ORGANIZED HEALTH CARE EDUCATION/TRAINING PROGRAM

## 2019-12-06 RX ORDER — IBUPROFEN 600 MG/1
600 TABLET ORAL EVERY 6 HOURS
Qty: 60 TABLET | Refills: 0 | Status: SHIPPED | OUTPATIENT
Start: 2019-12-06 | End: 2020-10-24

## 2019-12-06 RX ADMIN — IBUPROFEN 600 MG: 600 TABLET, FILM COATED ORAL at 12:12

## 2019-12-06 RX ADMIN — IBUPROFEN 600 MG: 600 TABLET, FILM COATED ORAL at 11:12

## 2019-12-06 RX ADMIN — IBUPROFEN 600 MG: 600 TABLET, FILM COATED ORAL at 06:12

## 2019-12-06 RX ADMIN — PRENATAL VIT W/ FE FUMARATE-FA TAB 27-0.8 MG 1 TABLET: 27-0.8 TAB at 08:12

## 2019-12-06 NOTE — LACTATION NOTE
LC assisted pt to latch infant on left side in football hold, wide gape and deep latch achieved. Audible swallows with breast compression. Educated pt to begin pumping 1-2x/24 hours after pumping for 10-15 minutes to help build/maintain milk supply d/t history of breast augmentation. Lactation discharge education completed. Plan of care is for pt to follow basic breastfeeding education, frequent feeding on demand, and to monitor baby's voids and stools. Breastfeeding guide, including First Alert survey, resource list, and lactation warmline phone number reviewed. Pt to notify doctor for maternal or infant concerns, as reviewed with LC. Pt verbalizes understanding and questions answered.        12/06/19 1200   Maternal Assessment   Breast Shape round   Breast Density soft   Areola elastic   Nipples everted   Maternal Infant Feeding   Maternal Emotional State assist needed   Infant Positioning clutch/football   Signs of Milk Transfer audible swallow;infant jaw motion present   Pain with Feeding no   Nipple Shape After Feeding, Left   (continues feeding)   Latch Assistance yes   Lactation Referrals   Lactation Referrals outpatient lactation program;support group

## 2019-12-06 NOTE — DISCHARGE INSTRUCTIONS
Breastfeeding Discharge Instructions       Feed the baby at the earliest sign of hunger or comfort  o Hands to mouth, sucking motions  o Rooting or searching for something to suck on  o Dont wait for crying - it is a sign of distress     The feedings may be 8-12 times per 24hrs and will not follow a schedule   Avoid pacifiers and bottles for the first 4 weeks   Alternate the breast you start the feeding with, or start with the breast that feels the fullest   Switch breasts when the baby takes himself off the breast or falls asleep   Keep offering breasts until the baby looks full, no longer gives hunger signs, and stays asleep when placed on his back in the crib   If the baby is sleepy and wont wake for a feeding, put the baby skin-to-skin dressed in a diaper against the mothers bare chest   Sleep near your baby   The baby should be positioned and latched on to the breast correctly  o Chest-to-chest, chin in the breast  o Babys lips are flipped outward  o Babys mouth is stretched open wide like a shout  o Babys sucking should feel like tugging to the mother  - The baby should be drinking at the breast:  o You should hear swallowing or gulping throughout the feeding  o You should see milk on the babys lips when he comes off the breast  o Your breasts should be softer when the baby is finished feeding  o The baby should look relaxed at the end of feedings  o After the 4th day and your milk is in:  o The babys poop should turn bright yellow and be loose, watery, and seedy  o The baby should have at least 3-4 poops the size of the palm of your hand per day  o The baby should have at least 5-6 wet diapers per day  o The urine should be light yellow in color  You should drink when you are thirsty and eat a healthy diet when you are    hungry.     Take naps to get the rest you need.   Take medications and/or drink alcohol only with permission of your obstetrician    or the babys pediatrician.  You can  also call the Infant Risk Center,   ([phone]), Monday-Friday, 8am-5pm Central time, to get the most   up-to-date evidence-based information on the use of medications during   pregnancy and breastfeeding.      The baby should be examined by a pediatrician at 3-5 days of age.  Once your   milk comes in, the baby should be gaining at least ½ - 1oz each day and should be back to birthweight no later than 10-14 days of age.          Community Resources    [facility] Breastfeeding Warmline: [phone]   Local [facility] clinics: provide incentives and breastpumps to eligible mothers  [employer] ([employer]LI):  mother-to-mother support group website        www.Quilll.Wattpad  Local La Leche League mother-to-mother support groups:        www.[employer]        [facility]he [facility]   Dr. Reinaldo Dumont website for latch videos and general information:        www.breastfeedinginc.ca  Infant Risk Center is a call center that provides information about the safety of taking medications while breastfeeding.  Call 1-775.346.8709, M-F, 8am-5pm, [place].  International Lactation Consultant Association provides resources for assistance:        www.ilca.org  [place] Breastfeeding Coalition provides informationand resources for parents  and the community    www.LaBreastfeedingSupport.org     Brittany Thomas is a mom-to-mom support group:                             www.[employer]ren[employer]ay.[email or web address]//breastfeedng-support/  [employer] for Healthy Babies:  5-819-976-BABY(6042)  [employer]: a breastfeeding support group for women of color, 104.303.7036

## 2019-12-06 NOTE — ANESTHESIA POSTPROCEDURE EVALUATION
Anesthesia Post Evaluation    Patient: Chelsie Green    Procedure(s) Performed: * No procedures listed *    Final Anesthesia Type: epidural    Patient location during evaluation: labor & delivery  Patient participation: Yes- Able to Participate  Level of consciousness: awake and alert, awake and oriented  Post-procedure vital signs: reviewed and stable  Pain management: adequate  Airway patency: patent    PONV status at discharge: No PONV      Cardiovascular status: blood pressure returned to baseline  Respiratory status: unassisted, spontaneous ventilation and room air  Hydration status: euvolemic            Vitals Value Taken Time   /58 12/6/2019  8:43 AM   Temp 36.8 °C (98.2 °F) 12/6/2019  8:43 AM   Pulse 67 12/6/2019  8:43 AM   Resp 18 12/6/2019  8:43 AM   SpO2 98 % 12/6/2019  8:43 AM         No case tracking events are documented in the log.      Pain/Bob Score: Pain Rating Prior to Med Admin: 2 (12/6/2019 11:21 AM)  Pain Rating Post Med Admin: 2 (12/5/2019  6:19 PM)

## 2019-12-06 NOTE — PLAN OF CARE
Based on pt's stated feeding goals, the Plan of care for today is for pt to do skin-to-skin, to feed frequently on demand, to observe for signs of effective milk transfer, to monitor voids and stools, and to call for assistance. Pt may breastfeed and/ or express breastmilk with hand expression, and provide EBM to baby with cup, or spoon, as needed. Pt to begin pumping 1-2x/24 hours after feedings for 10-15 minutes to help build/maintain milk production d/t hx of augmentation. Pt verbalizes understanding.

## 2019-12-06 NOTE — DISCHARGE SUMMARY
Delivery Discharge Summary  Obstetrics      Primary OB Clinician: Shannon Barrios MD      Admission date: 2019  Discharge date: 2019    Disposition: To home, self care    Discharge Diagnosis List:      Patient Active Problem List   Diagnosis    ASCUS (atypical squamous cells of undetermined significance) on Pap smear    Chronic right-sided low back pain with sciatica    Episcleritis of right eye    Group B Streptococcus urinary tract infection affecting pregnancy in first trimester    Supervision of normal first pregnancy in third trimester    Elevated blood pressure affecting pregnancy in third trimester, antepartum    Gestational hypertension, third trimester     (spontaneous vaginal delivery)     Procedure:     Hospital Course:  Chelsie Green is a 29 y.o. now , PPD #2 who was admitted on 2019 at 37w5d for gestational hypertension. Patient was subsequently admitted to labor and delivery unit with signed consents.   This pregnancy was complicated by history of GBS UTI this pregnancy, as well as gestational thrombocytopenia earlier in the third trimester, which is now resolved, and HAs.  Labor course was uncomplicated and resulted in  without complications.  Please see delivery note for further details. Her postpartum course was uncomplicated. On discharge day, patient's pain is controlled with oral pain medications. Pt is tolerating ambulation without SOB or CP, and regular diet without N/V. Reports lochia is mild. Denies any HA, vision changes, F/C, LE swelling. Denies any breast pain/soreness.    Pt in stable condition and ready for discharge. She has been instructed to start and/or continue medications and follow up with her obstetrics provider as listed below.    Pertinent studies:  CBC  Recent Labs   Lab 19  0940 19  1516   WBC 9.63 13.99*   HGB 13.7 10.6*   HCT 40.7 31.4*   MCV 84 85    146*     Immunization History   Administered Date(s)  Administered    DTaP 1991, 1991, 03/26/1992, 06/24/1992, 01/28/1993, 02/23/1995    HIB 1991, 1991, 03/26/1992, 06/24/1992, 01/28/1993    HPV Quadrivalent 02/12/2007, 05/03/2007, 08/14/2007    Hepatitis A, Pediatric/Adolescent, 2 Dose 09/11/2005    Hepatitis B 11/04/1994    Hepatitis B, Adult 1991, 08/01/2013, 09/11/2013, 01/31/2014    Hepatitis B, Pediatric/Adolescent 04/08/1993, 10/07/1994, 05/11/1995    IPV 1991, 1991, 01/28/1993    Influenza 10/16/2013    Influenza - Quadrivalent - PF (6 months and older) 10/29/2003, 01/13/2006, 11/21/2007, 12/23/2008, 02/27/2012, 10/10/2014, 09/09/2016, 09/16/2017, 10/22/2018, 10/02/2019    MMR 10/07/1994, 02/23/1995    Meningococcal Conjugate (MCV4O) 06/24/2013    Meningococcal Conjugate (MCV4P) 01/13/2006, 04/04/2016    OPV 02/23/1995    PPD Test 05/18/2012, 05/21/2012, 05/03/2013, 03/28/2014    Td (Adult), Unspecified Formulation 10/29/2003, 09/11/2005    Tdap 12/23/2008, 12/05/2018, 09/24/2019    Tetanus 12/23/2008        Delivery:    Episiotomy: None   Lacerations: 1st   Repair suture:     Repair # of packets: 2   Blood loss (ml): 250     Birth information:  YOB: 2018   Time of birth: 2:20 AM   Sex: male   Delivery type: Vaginal, Spontaneous   Gestational Age: 37w6d    Delivery Clinician:      Other providers:       Additional  information:  Forceps:    Vacuum:    Breech:    Observed anomalies      Living?:           APGARS  One minute Five minutes Ten minutes   Skin color:         Heart rate:         Grimace:         Muscle tone:         Breathing:         Totals: 9  9        Placenta: Delivered:       appearance      Patient Instructions:   Current Discharge Medication List      START taking these medications    Details   ibuprofen (ADVIL,MOTRIN) 600 MG tablet Take 1 tablet (600 mg total) by mouth every 6 (six) hours.  Qty: 60 tablet, Refills: 0         CONTINUE these medications which have NOT  CHANGED    Details   butalbital-acetaminophen-caffeine -40 mg (FIORICET, ESGIC) -40 mg per tablet Take 1 tablet by mouth every 4 (four) hours as needed for Headaches.  Qty: 20 tablet, Refills: 0    Associated Diagnoses: Pregnancy headache in third trimester      ivermectin (SOOLANTRA) 1 % Crea APPLY TOPICALLY TWICE DAILY to affected area  Qty: 45 g, Refills: 2      prenatal 105-iron-folic ac-dha 30 mg iron- 1.4 mg-300 mg Cmpk Take by mouth.             Discharge Procedure Orders   Pelvic Rest   Scheduling Instructions: Nothing in the vagina for 6 weeks - No douching, tampons, or sex.     Notify your health care provider if you experience any of the following:  temperature >100.4     Notify your health care provider if you experience any of the following:  severe uncontrolled pain     Notify your health care provider if you experience any of the following:  difficulty breathing or increased cough     Notify your health care provider if you experience any of the following:  persistent nausea and vomiting or diarrhea     Notify your health care provider if you experience any of the following:  worsening rash     Notify your health care provider if you experience any of the following:  severe persistent headache     Notify your health care provider if you experience any of the following:  persistent dizziness, light-headedness, or visual disturbances     Notify your health care provider if you experience any of the following:  increased confusion or weakness     Notify your health care provider if you experience any of the following:   Scheduling Instructions: Heavy vaginal bleeding, soaking though more than 1 heavy pad per hour.    Feelings of overwhelming sadness or anxiety more than half the time.       Follow-up Information     Shannon Barrios MD. Schedule an appointment as soon as possible for a visit in 6 weeks.    Specialty:  Obstetrics and Gynecology  Why:  for post partum visit  Contact  information:  [address] [address]  [phone]             Shannon Barrios MD. Schedule an appointment as soon as possible for a visit in 1 week.    Specialty:  Obstetrics and Gynecology  Why:  for BP check  Contact information:  [address] [address]  [phone]                  Raymond Caraballo M.D.  Obstetrics & Gynecology  PGY-3

## 2019-12-06 NOTE — PROGRESS NOTES
POSTPARTUM PROGRESS NOTE     Chelsie Green is a 29 y.o. female PPD #1 status post Spontaneous vaginal delivery at 37w6d in a pregnancy complicated by gHTN, chronic headache, second degree laceration. Patient is doing well this morning. She denies nausea, vomiting, fever or chills.  Patient reports mild abdominal pain that is well relieved by oral pain medications. Lochia is mild and decreasing. Patient is voiding without difficulty and ambulating with no difficulty. She has passed flatus, and has not had BM.  Patient does plan to breast feed. Per Dr. Barrios for contraception. She desires circumcision.     Objective:       Temp:  [97.9 °F (36.6 °C)-98.4 °F (36.9 °C)] 98.2 °F (36.8 °C)  Pulse:  [67-81] 67  Resp:  [16-18] 18  SpO2:  [98 %-99 %] 98 %  BP: (110-137)/(58-64) 124/58    General:   alert, appears stated age and cooperative   Lungs:   clear to auscultation bilaterally   Heart:   regular rate and rhythm, S1, S2 normal, no murmur, click, rub or gallop   Abdomen:  soft, non-tender; bowel sounds normal; no masses,  no organomegaly   Uterus:  firm located at the umblicus.    Extremities: peripheral pulses normal, no pedal edema, no clubbing or cyanosis     Lab Review  No results found for this or any previous visit (from the past 4 hour(s)).    I/O    Intake/Output Summary (Last 24 hours) at 2019 0935  Last data filed at 2019 1500  Gross per 24 hour   Intake --   Output 950 ml   Net -950 ml        Assessment:     Patient Active Problem List   Diagnosis    ASCUS (atypical squamous cells of undetermined significance) on Pap smear    Chronic right-sided low back pain with sciatica    Episcleritis of right eye    Group B Streptococcus urinary tract infection affecting pregnancy in first trimester    Supervision of normal first pregnancy in third trimester    Elevated blood pressure affecting pregnancy in third trimester, antepartum    Gestational hypertension, third trimester     (spontaneous  vaginal delivery)        Plan:   1. Postpartum care:  - Patient doing well. Continue routine management and advances.  - Continue PO pain meds. Pain well controlled.  - Heme: H/h 13.7/40.7. Post: 10.6/31  - Encourage ambulation  - Circumcision desired and consented  - Contraception per Dr. Barrios  - Lactation breastfeeding, consultation PRN    2. gHTN  -SpO2:  [98 %-99 %] 98 %  -BP: (110-137)/(58-64) 124/58  -asx  -no blood pressure medications indicated at this time    3. GTP  -post op platelets 146    4. Chronic HA  -no pain, controlled    Dispo: As patient meets milestones, will plan to discharge PPD1-2.    Jihan Sutton MD  OBGYN PGY-1

## 2019-12-11 ENCOUNTER — POSTPARTUM VISIT (OUTPATIENT)
Dept: OBSTETRICS AND GYNECOLOGY | Facility: CLINIC | Age: 29
End: 2019-12-11
Payer: COMMERCIAL

## 2019-12-11 VITALS
SYSTOLIC BLOOD PRESSURE: 120 MMHG | BODY MASS INDEX: 26.34 KG/M2 | DIASTOLIC BLOOD PRESSURE: 74 MMHG | HEIGHT: 64 IN | WEIGHT: 154.31 LBS

## 2019-12-11 DIAGNOSIS — Z01.30 BLOOD PRESSURE CHECK: Primary | ICD-10-CM

## 2019-12-11 PROCEDURE — 99213 PR OFFICE/OUTPT VISIT, EST, LEVL III, 20-29 MIN: ICD-10-PCS | Mod: S$GLB,,, | Performed by: NURSE PRACTITIONER

## 2019-12-11 PROCEDURE — 3008F BODY MASS INDEX DOCD: CPT | Mod: CPTII,S$GLB,, | Performed by: NURSE PRACTITIONER

## 2019-12-11 PROCEDURE — 3008F PR BODY MASS INDEX (BMI) DOCUMENTED: ICD-10-PCS | Mod: CPTII,S$GLB,, | Performed by: NURSE PRACTITIONER

## 2019-12-11 PROCEDURE — 99999 PR PBB SHADOW E&M-EST. PATIENT-LVL III: ICD-10-PCS | Mod: PBBFAC,,, | Performed by: NURSE PRACTITIONER

## 2019-12-11 PROCEDURE — 99999 PR PBB SHADOW E&M-EST. PATIENT-LVL III: CPT | Mod: PBBFAC,,, | Performed by: NURSE PRACTITIONER

## 2019-12-11 PROCEDURE — 99213 OFFICE O/P EST LOW 20 MIN: CPT | Mod: S$GLB,,, | Performed by: NURSE PRACTITIONER

## 2019-12-11 NOTE — PROGRESS NOTES
History & Physical  Gynecology      SUBJECTIVE:     Chief Complaint:   Blood Pressure Check       History of Present Illness  Chelsie Green is a 29 y.o. female   presents for post partum BP recheck. Reports giving birth Uncomplicated vaginal delivery on 19, pregnancy complicated by gHTN and gestational thrombocytopenia. Denies  headache, blurred vision, dizziness, swelling, RUQ abdominal pain, CP, SOB. Excellent bonding with  son, Corey. Breastfeeding without issue.    BP Readings from Last 2 Encounters:   19 120/74   19 (!) 124/58          Review of patient's allergies indicates:  No Known Allergies    Past Medical History:   Diagnosis Date    Abnormal Pap smear of cervix     Heart murmur     evaluated as a child, told not to be a problem    Scoliosis     didn' t need brace     Past Surgical History:   Procedure Laterality Date    breast augmentation      NOSE SURGERY      correct fracture of nose    TONSILLECTOMY, ADENOIDECTOMY       OB History        1    Para   1    Term   1            AB        Living   1       SAB        TAB        Ectopic        Multiple   0    Live Births   1               Family History   Problem Relation Age of Onset    Breast cancer Neg Hx     Colon cancer Neg Hx     Ovarian cancer Neg Hx     Cancer Neg Hx      Social History     Tobacco Use    Smoking status: Never Smoker    Smokeless tobacco: Never Used   Substance Use Topics    Alcohol use: Not Currently     Alcohol/week: 0.0 standard drinks     Comment: once a week    Drug use: No       Current Outpatient Medications   Medication Sig    butalbital-acetaminophen-caffeine -40 mg (FIORICET, ESGIC) -40 mg per tablet Take 1 tablet by mouth every 4 (four) hours as needed for Headaches.    ibuprofen (ADVIL,MOTRIN) 600 MG tablet Take 1 tablet (600 mg total) by mouth every 6 (six) hours.    ivermectin (SOOLANTRA) 1 % Crea APPLY TOPICALLY TWICE DAILY to affected area     prenatal 105-iron-folic ac-dha 30 mg iron- 1.4 mg-300 mg Cmpk Take by mouth.     No current facility-administered medications for this visit.          Review of Systems:  Review of Systems   Constitutional: Negative for activity change, appetite change, chills, fatigue, fever and unexpected weight change.   HENT: Negative for nasal congestion and mouth sores.    Eyes: Negative for discharge and visual disturbance.   Respiratory: Negative for shortness of breath and wheezing.    Cardiovascular: Negative for chest pain, palpitations and leg swelling.   Gastrointestinal: Negative for abdominal pain, constipation, diarrhea, nausea, vomiting and reflux.   Endocrine: Negative for diabetes, hair loss, hot flashes, hyperthyroidism and hypothyroidism.   Genitourinary: Negative for bladder incontinence, decreased libido, dysmenorrhea, dyspareunia, dysuria, flank pain, frequency, genital sores, hematuria, hot flashes, menorrhagia, menstrual problem, pelvic pain, urgency, vaginal bleeding, vaginal discharge, vaginal pain, urinary incontinence, postcoital bleeding, postmenopausal bleeding, vaginal dryness and vaginal odor.   Musculoskeletal: Negative for arthralgias, back pain, joint swelling, leg pain and myalgias.   Integumentary:  Negative for rash, acne, hair changes, mole/lesion, breast mass, nipple discharge, breast skin changes and breast tenderness.   Neurological: Negative for vertigo, seizures, syncope, numbness and headaches.   Hematological: Negative for adenopathy. Does not bruise/bleed easily.   Psychiatric/Behavioral: Negative for depression and sleep disturbance. The patient is not nervous/anxious.    Breast: Negative for asymmetry, breast self exam, lump, mass, mastodynia, nipple discharge, skin changes and tenderness       OBJECTIVE:     Physical Exam:  Physical Exam   Constitutional: She is oriented to person, place, and time. She appears well-developed and well-nourished. No distress.   HENT:   Head:  Normocephalic and atraumatic.   Nose: Nose normal.   Mouth/Throat: Oropharynx is clear and moist.   Eyes: Pupils are equal, round, and reactive to light. Conjunctivae and EOM are normal.   Neck: Normal range of motion. Neck supple. No JVD present. No tracheal deviation present. No thyromegaly present.   Cardiovascular: Normal rate, regular rhythm, normal heart sounds and intact distal pulses. Exam reveals no gallop and no friction rub.   No murmur heard.  Pulmonary/Chest: Effort normal and breath sounds normal. No stridor. No respiratory distress. She has no wheezes. She has no rales. She exhibits no tenderness.   Abdominal: Soft. Bowel sounds are normal. She exhibits no distension and no mass. There is no tenderness. There is no rebound and no guarding. No hernia.   Genitourinary:   Genitourinary Comments: Deferred   Musculoskeletal: Normal range of motion. She exhibits no edema or tenderness.   Lymphadenopathy:     She has no cervical adenopathy.   Neurological: She is alert and oriented to person, place, and time. She displays normal reflexes. No sensory deficit. She exhibits normal muscle tone. Coordination normal.   Skin: Skin is warm and dry. Capillary refill takes less than 2 seconds. No rash noted. She is not diaphoretic. No erythema. No pallor.   Psychiatric: She has a normal mood and affect. Her behavior is normal. Judgment and thought content normal.   Nursing note and vitals reviewed.        ASSESSMENT:       ICD-10-CM ICD-9-CM    1. Blood pressure check Z01.30 V81.1           Plan:      /74 and 110/79 in clinic. Patient is asymptomatic. PreE precautions given. FU at Dr. Barrios in 5 weeks. FU at [facility] prb.   Counseling time: 15 minutes    Belen Milton

## 2019-12-19 ENCOUNTER — TELEPHONE (OUTPATIENT)
Dept: OBSTETRICS AND GYNECOLOGY | Facility: OTHER | Age: 29
End: 2019-12-19

## 2020-01-16 ENCOUNTER — POSTPARTUM VISIT (OUTPATIENT)
Dept: OBSTETRICS AND GYNECOLOGY | Facility: CLINIC | Age: 30
End: 2020-01-16
Payer: COMMERCIAL

## 2020-01-16 VITALS — SYSTOLIC BLOOD PRESSURE: 110 MMHG | WEIGHT: 149.5 LBS | DIASTOLIC BLOOD PRESSURE: 62 MMHG | BODY MASS INDEX: 25.64 KG/M2

## 2020-01-16 DIAGNOSIS — N61.0 MASTITIS: ICD-10-CM

## 2020-01-16 DIAGNOSIS — Z30.011 ENCOUNTER FOR INITIAL PRESCRIPTION OF CONTRACEPTIVE PILLS: Primary | ICD-10-CM

## 2020-01-16 PROCEDURE — 99999 PR PBB SHADOW E&M-EST. PATIENT-LVL II: CPT | Mod: PBBFAC,,, | Performed by: OBSTETRICS & GYNECOLOGY

## 2020-01-16 PROCEDURE — 99999 PR PBB SHADOW E&M-EST. PATIENT-LVL II: ICD-10-PCS | Mod: PBBFAC,,, | Performed by: OBSTETRICS & GYNECOLOGY

## 2020-01-16 PROCEDURE — 0503F POSTPARTUM CARE VISIT: CPT | Mod: S$GLB,,, | Performed by: OBSTETRICS & GYNECOLOGY

## 2020-01-16 PROCEDURE — 0503F PR POSTPARTUM CARE VISIT: ICD-10-PCS | Mod: S$GLB,,, | Performed by: OBSTETRICS & GYNECOLOGY

## 2020-01-16 RX ORDER — DICLOXACILLIN SODIUM 250 MG/1
250 CAPSULE ORAL 4 TIMES DAILY
Qty: 40 CAPSULE | Refills: 0 | Status: SHIPPED | OUTPATIENT
Start: 2020-01-16 | End: 2020-01-26

## 2020-01-16 NOTE — PROGRESS NOTES
CC: Post-partum follow-up    Chelsie Green is a 29 y.o. female  presents for post-partum visit s/p a .  Complains of right breast pain.  Notes feeling feverish last night.  Still in pain today.  No redness of the breast bilaterally.  Tried to nurse with nipple shield and doesn't feel like she emptied the breast well yesterday    Delivery Date: 19  Delivery MD: Shannon Barrios MD   Gender: male  Birth Weight: 7 pounds 1 ounces  Breast Feeding: YES  Depression: NO  Contraception: no method; interested in progesterone only    Pregnancy was complicated by:  GBS UTI and gestational Thrombocytopenia      /62   Wt 67.8 kg (149 lb 7.6 oz)   LMP 03/15/2019   Breastfeeding? Yes   BMI 25.64 kg/m²     ROS:  GENERAL: No fever, chills, fatigability or weight loss.  VULVAR: No pain, no lesions and no itching.  VAGINAL: No relaxation, no itching, no discharge, no abnormal bleeding and no lesions.  ABDOMEN: No abdominal pain. Denies nausea. Denies vomiting. No diarrhea. No constipation  BREAST: Denies pain. No lumps. No discharge.  URINARY: No incontinence, no nocturia, no frequency and no dysuria.  CARDIOVASCULAR: No chest pain. No shortness of breath. No leg cramps.  NEUROLOGICAL: No headaches. No vision changes.    PHYSICAL EXAM:  BREAST:  Right breat with TTP from 4-7 oclock.  No erythema.  Area feels engorged and tender.  No skin changes  PELVIC: normal vagina and vulva, perineum healing well.  One suture still in place.  No signs of infection, bleeding or dehiscence, normal cervix without lesions, polyps or tenderness, uterus normal size, shape, consistency, no mass or tenderness, adnexa normal in size without mass or tenderness    PROCEDURES:  - none      Diagnosis:  1. Encounter for initial prescription of contraceptive pills    2. Mastitis    3.  (spontaneous vaginal delivery)        Plan:     Chelsie was seen today for postpartum care and breast pain.    Diagnoses and all orders for this  visit:    Encounter for initial prescription of contraceptive pills  -     drospirenone, contraceptive, 4 mg (28) Tab; Take 1 tablet (4 mg) by mouth once daily.    Mastitis  - Recommend trying to empty that breast with each feeding.  Can pump after nursing  - Will give Rx to cover for mastitis  -     dicloxacillin (DYNAPEN) 250 MG capsule; Take 1 capsule (250 mg total) by mouth 4 (four) times daily. for 10 days     (spontaneous vaginal delivery)    - Still healing   - Ok to start working out   -Would wait another week before having intercourse.      No orders of the defined types were placed in this encounter.      Follow up in about 1 year (around 2021) for annual.      Orders Placed This Encounter    drospirenone, contraceptive, 4 mg (28) Tab    dicloxacillin (DYNAPEN) 250 MG capsule         Patient was counseled today on A.C.S. Pap guidelines and recommendations for yearly pelvic exams and monthly self breast exams; to see her PCP for other health maintenance.    FOLLOW UP: in 1 year for routine exam    Shannon Barrios MD  Obstetrics & Gynecology

## 2020-02-03 ENCOUNTER — PATIENT MESSAGE (OUTPATIENT)
Dept: OBSTETRICS AND GYNECOLOGY | Facility: CLINIC | Age: 30
End: 2020-02-03

## 2020-03-09 PROBLEM — O13.3 GESTATIONAL HYPERTENSION, THIRD TRIMESTER: Status: RESOLVED | Noted: 2019-12-04 | Resolved: 2020-03-09

## 2020-04-21 ENCOUNTER — PATIENT MESSAGE (OUTPATIENT)
Dept: OBSTETRICS AND GYNECOLOGY | Facility: CLINIC | Age: 30
End: 2020-04-21

## 2020-04-21 DIAGNOSIS — Z01.84 ANTIBODY RESPONSE EXAMINATION: ICD-10-CM

## 2020-04-21 DIAGNOSIS — Z30.41 ENCOUNTER FOR SURVEILLANCE OF CONTRACEPTIVE PILLS: Primary | ICD-10-CM

## 2020-04-21 DIAGNOSIS — L70.9 ACNE, UNSPECIFIED ACNE TYPE: ICD-10-CM

## 2020-04-22 RX ORDER — LEVONORGESTREL / ETHINYL ESTRADIOL AND ETHINYL ESTRADIOL 150-30(84)
1 KIT ORAL DAILY
Qty: 91 EACH | Refills: 5 | Status: SHIPPED | OUTPATIENT
Start: 2020-04-22 | End: 2021-07-30 | Stop reason: SDUPTHER

## 2020-04-22 RX ORDER — SPIRONOLACTONE 25 MG/1
TABLET ORAL
Qty: 60 TABLET | Refills: 11 | Status: SHIPPED | OUTPATIENT
Start: 2020-04-22 | End: 2022-02-08

## 2020-04-23 ENCOUNTER — LAB VISIT (OUTPATIENT)
Dept: LAB | Facility: HOSPITAL | Age: 30
End: 2020-04-23
Attending: INTERNAL MEDICINE
Payer: COMMERCIAL

## 2020-04-23 DIAGNOSIS — Z01.84 ANTIBODY RESPONSE EXAMINATION: ICD-10-CM

## 2020-04-23 LAB — SARS-COV-2 IGG SERPL QL IA: NEGATIVE

## 2020-04-23 PROCEDURE — 36415 COLL VENOUS BLD VENIPUNCTURE: CPT

## 2020-04-23 PROCEDURE — 86769 SARS-COV-2 COVID-19 ANTIBODY: CPT

## 2020-10-05 ENCOUNTER — PATIENT MESSAGE (OUTPATIENT)
Dept: ADMINISTRATIVE | Facility: HOSPITAL | Age: 30
End: 2020-10-05

## 2020-10-22 RX ORDER — ESTRADIOL 0.1 MG/G
CREAM VAGINAL
Qty: 42.5 G | Refills: 0 | Status: SHIPPED | OUTPATIENT
Start: 2020-10-22 | End: 2022-02-08

## 2020-10-24 ENCOUNTER — LAB VISIT (OUTPATIENT)
Dept: LAB | Facility: HOSPITAL | Age: 30
End: 2020-10-24
Attending: INTERNAL MEDICINE
Payer: COMMERCIAL

## 2020-10-24 ENCOUNTER — OFFICE VISIT (OUTPATIENT)
Dept: INTERNAL MEDICINE | Facility: CLINIC | Age: 30
End: 2020-10-24
Payer: COMMERCIAL

## 2020-10-24 VITALS
WEIGHT: 148.38 LBS | BODY MASS INDEX: 25.33 KG/M2 | OXYGEN SATURATION: 96 % | SYSTOLIC BLOOD PRESSURE: 110 MMHG | HEART RATE: 89 BPM | HEIGHT: 64 IN | DIASTOLIC BLOOD PRESSURE: 70 MMHG

## 2020-10-24 DIAGNOSIS — Z12.11 COLON CANCER SCREENING: ICD-10-CM

## 2020-10-24 DIAGNOSIS — Z00.00 ANNUAL PHYSICAL EXAM: Primary | ICD-10-CM

## 2020-10-24 DIAGNOSIS — L70.9 ACNE, UNSPECIFIED ACNE TYPE: ICD-10-CM

## 2020-10-24 DIAGNOSIS — Z00.00 ANNUAL PHYSICAL EXAM: ICD-10-CM

## 2020-10-24 DIAGNOSIS — K04.7 TOOTH ABSCESS: ICD-10-CM

## 2020-10-24 LAB
ALBUMIN SERPL BCP-MCNC: 3.8 G/DL (ref 3.5–5.2)
ALP SERPL-CCNC: 36 U/L (ref 55–135)
ALT SERPL W/O P-5'-P-CCNC: 13 U/L (ref 10–44)
ANION GAP SERPL CALC-SCNC: 7 MMOL/L (ref 8–16)
AST SERPL-CCNC: 16 U/L (ref 10–40)
BILIRUB SERPL-MCNC: 0.6 MG/DL (ref 0.1–1)
BUN SERPL-MCNC: 10 MG/DL (ref 6–20)
CALCIUM SERPL-MCNC: 9.1 MG/DL (ref 8.7–10.5)
CHLORIDE SERPL-SCNC: 105 MMOL/L (ref 95–110)
CO2 SERPL-SCNC: 26 MMOL/L (ref 23–29)
CREAT SERPL-MCNC: 0.8 MG/DL (ref 0.5–1.4)
ERYTHROCYTE [DISTWIDTH] IN BLOOD BY AUTOMATED COUNT: 12.2 % (ref 11.5–14.5)
EST. GFR  (AFRICAN AMERICAN): >60 ML/MIN/1.73 M^2
EST. GFR  (NON AFRICAN AMERICAN): >60 ML/MIN/1.73 M^2
FERRITIN SERPL-MCNC: 59 NG/ML (ref 20–300)
GLUCOSE SERPL-MCNC: 101 MG/DL (ref 70–110)
HCT VFR BLD AUTO: 43.5 % (ref 37–48.5)
HGB BLD-MCNC: 13.8 G/DL (ref 12–16)
IRON SERPL-MCNC: 77 UG/DL (ref 30–160)
MCH RBC QN AUTO: 27.7 PG (ref 27–31)
MCHC RBC AUTO-ENTMCNC: 31.7 G/DL (ref 32–36)
MCV RBC AUTO: 87 FL (ref 82–98)
PLATELET # BLD AUTO: 244 K/UL (ref 150–350)
PMV BLD AUTO: 10.5 FL (ref 9.2–12.9)
POTASSIUM SERPL-SCNC: 4.3 MMOL/L (ref 3.5–5.1)
PROT SERPL-MCNC: 7.4 G/DL (ref 6–8.4)
RBC # BLD AUTO: 4.99 M/UL (ref 4–5.4)
SATURATED IRON: 16 % (ref 20–50)
SODIUM SERPL-SCNC: 138 MMOL/L (ref 136–145)
TOTAL IRON BINDING CAPACITY: 491 UG/DL (ref 250–450)
TRANSFERRIN SERPL-MCNC: 332 MG/DL (ref 200–375)
WBC # BLD AUTO: 5.98 K/UL (ref 3.9–12.7)

## 2020-10-24 PROCEDURE — 83540 ASSAY OF IRON: CPT

## 2020-10-24 PROCEDURE — 99999 PR PBB SHADOW E&M-EST. PATIENT-LVL III: CPT | Mod: PBBFAC,,, | Performed by: INTERNAL MEDICINE

## 2020-10-24 PROCEDURE — 82728 ASSAY OF FERRITIN: CPT

## 2020-10-24 PROCEDURE — 99395 PREV VISIT EST AGE 18-39: CPT | Mod: S$GLB,,, | Performed by: INTERNAL MEDICINE

## 2020-10-24 PROCEDURE — 86803 HEPATITIS C AB TEST: CPT

## 2020-10-24 PROCEDURE — 99999 PR PBB SHADOW E&M-EST. PATIENT-LVL III: ICD-10-PCS | Mod: PBBFAC,,, | Performed by: INTERNAL MEDICINE

## 2020-10-24 PROCEDURE — 85027 COMPLETE CBC AUTOMATED: CPT

## 2020-10-24 PROCEDURE — 36415 COLL VENOUS BLD VENIPUNCTURE: CPT

## 2020-10-24 PROCEDURE — 99395 PR PREVENTIVE VISIT,EST,18-39: ICD-10-PCS | Mod: S$GLB,,, | Performed by: INTERNAL MEDICINE

## 2020-10-24 PROCEDURE — 80053 COMPREHEN METABOLIC PANEL: CPT

## 2020-10-24 RX ORDER — TRETINOIN 0.05 G/100G
GEL TOPICAL NIGHTLY
Qty: 45 G | Refills: 0 | Status: SHIPPED | OUTPATIENT
Start: 2020-10-24 | End: 2022-02-08

## 2020-10-24 RX ORDER — AMOXICILLIN 500 MG/1
500 TABLET, FILM COATED ORAL EVERY 8 HOURS
Qty: 21 TABLET | Refills: 0 | Status: SHIPPED | OUTPATIENT
Start: 2020-10-24 | End: 2020-10-31

## 2020-10-24 NOTE — PROGRESS NOTES
Subjective:       Patient ID: Chelsie Green is a 30 y.o. female.    Chief Complaint: Annual Exam      HPI    Last visit with Internal Medicine September 2019. Since then gave birth back in 12/2019.     Patient reports skin breakout on face with mask wearing. Uses tretinoin gel.    occasionally with blood streaking outside of stool. Notes she regularly has constipation. No abdominal pain or other problem.    Front left tooth with papule forming on gum starting yesterday, has known problem with this tooth. Also associated with sinus pressure. No fever or chills.    Reviewed PMH, PSH, SH, FH, allergies, and medications.     Review of Systems   Constitutional: Negative for activity change and unexpected weight change.   HENT: Negative for hearing loss, rhinorrhea and trouble swallowing.    Eyes: Negative for discharge and visual disturbance.   Respiratory: Negative for chest tightness and wheezing.    Cardiovascular: Negative for chest pain and palpitations.   Gastrointestinal: Positive for blood in stool. Negative for constipation, diarrhea and vomiting.   Endocrine: Negative for polydipsia and polyuria.   Genitourinary: Negative for difficulty urinating, dysuria, hematuria and menstrual problem.   Musculoskeletal: Negative for arthralgias, joint swelling and neck pain.   Neurological: Negative for weakness and headaches.   Psychiatric/Behavioral: Negative for confusion and dysphoric mood.       Objective:      Physical Exam  Vitals signs and nursing note reviewed.   Constitutional:       General: She is not in acute distress.     Appearance: She is not diaphoretic.   HENT:      Head: Atraumatic.      Right Ear: External ear normal.      Left Ear: External ear normal.      Mouth/Throat:      Mouth: Mucous membranes are moist.      Pharynx: No oropharyngeal exudate or posterior oropharyngeal erythema.      Comments: L frontal gum with papule above the L front tooth  Eyes:      General:         Right eye: No discharge.  "        Left eye: No discharge.      Pupils: Pupils are equal, round, and reactive to light.   Neck:      Musculoskeletal: Normal range of motion.      Thyroid: No thyromegaly.   Cardiovascular:      Rate and Rhythm: Normal rate and regular rhythm.      Heart sounds: Normal heart sounds.   Pulmonary:      Effort: Pulmonary effort is normal.      Breath sounds: Normal breath sounds. No stridor. No wheezing or rales.   Musculoskeletal:         General: No tenderness.      Right lower leg: No edema.      Left lower leg: No edema.   Lymphadenopathy:      Cervical: No cervical adenopathy.   Skin:     General: Skin is warm and dry.      Findings: Rash (faint nonpustular acne along lower face and chin) present.   Neurological:      General: No focal deficit present.      Mental Status: She is alert and oriented to person, place, and time.   Psychiatric:         Mood and Affect: Mood normal.         Behavior: Behavior normal.         Vitals:    10/24/20 0806   BP: 110/70   BP Location: Right arm   Patient Position: Sitting   BP Method: Medium (Manual)   Pulse: 89   SpO2: 96%   Weight: 67.3 kg (148 lb 5.9 oz)   Height: 5' 4" (1.626 m)     Body mass index is 25.47 kg/m².    RESULTS: Reviewed labs from last 12 months    Assessment:       1. Annual physical exam    2. Acne, unspecified acne type    3. Tooth abscess    4. Colon cancer screening        Plan:   Chelsie was seen today for annual exam.    Diagnoses and all orders for this visit:    Annual physical exam:  Age-appropriate health screening reviewed, indicated tests ordered.   -     Ferritin; Future  -     CBC Without Differential; Future  -     Iron and TIBC; Future  -     Hepatitis C Antibody; Future  -     Comprehensive Metabolic Panel; Future    Acne, unspecified acne type:  Prior diagnosis, continue tretinoin.  -     tretinoin (ALTRALIN) 0.05 % gel; Apply topically nightly.    Tooth abscess:  New problem, has known problem with this tooth, concern for developing " abscess, start amoxil.  -     amoxicillin (AMOXIL) 500 MG Tab; Take 1 tablet (500 mg total) by mouth every 8 (eight) hours. for 7 days    Colon cancer screening:  Patient reports blood in stool sporadically, thinks likely related to internal hemorrhoids however mother has history of polyps, order FIT KIT.  -     Fecal Immunochemical Test (iFOBT); Future      Follow up in about 1 year (around 10/24/2021) for PCP Dr Olvera. Labs today.  Kumar Genao MD, FACP [facility]    Portions of this note were completed using medical dictation software. Please excuse typographical or syntax errors that were missed on review.

## 2020-10-26 LAB — HCV AB SERPL QL IA: NEGATIVE

## 2020-11-18 ENCOUNTER — PATIENT MESSAGE (OUTPATIENT)
Dept: INTERNAL MEDICINE | Facility: CLINIC | Age: 30
End: 2020-11-18

## 2020-11-18 DIAGNOSIS — Z03.818 ENCOUNTER FOR OBSERVATION FOR SUSPECTED EXPOSURE TO OTHER BIOLOGICAL AGENTS RULED OUT: ICD-10-CM

## 2020-11-19 ENCOUNTER — TELEPHONE (OUTPATIENT)
Dept: OTOLARYNGOLOGY | Facility: CLINIC | Age: 30
End: 2020-11-19

## 2020-11-19 ENCOUNTER — OFFICE VISIT (OUTPATIENT)
Dept: OTOLARYNGOLOGY | Facility: CLINIC | Age: 30
End: 2020-11-19
Payer: COMMERCIAL

## 2020-11-19 VITALS — TEMPERATURE: 98 F | WEIGHT: 150.69 LBS | HEIGHT: 64 IN | BODY MASS INDEX: 25.73 KG/M2

## 2020-11-19 DIAGNOSIS — R68.84 JAW PAIN: ICD-10-CM

## 2020-11-19 DIAGNOSIS — H92.01 OTALGIA OF RIGHT EAR: ICD-10-CM

## 2020-11-19 DIAGNOSIS — H60.90 OTITIS EXTERNA, UNSPECIFIED CHRONICITY, UNSPECIFIED LATERALITY, UNSPECIFIED TYPE: Primary | ICD-10-CM

## 2020-11-19 PROCEDURE — 99214 PR OFFICE/OUTPT VISIT, EST, LEVL IV, 30-39 MIN: ICD-10-PCS | Mod: S$GLB,,, | Performed by: NURSE PRACTITIONER

## 2020-11-19 PROCEDURE — 3008F BODY MASS INDEX DOCD: CPT | Mod: CPTII,S$GLB,, | Performed by: NURSE PRACTITIONER

## 2020-11-19 PROCEDURE — 1125F AMNT PAIN NOTED PAIN PRSNT: CPT | Mod: S$GLB,,, | Performed by: NURSE PRACTITIONER

## 2020-11-19 PROCEDURE — 1125F PR PAIN SEVERITY QUANTIFIED, PAIN PRESENT: ICD-10-PCS | Mod: S$GLB,,, | Performed by: NURSE PRACTITIONER

## 2020-11-19 PROCEDURE — 99214 OFFICE O/P EST MOD 30 MIN: CPT | Mod: S$GLB,,, | Performed by: NURSE PRACTITIONER

## 2020-11-19 PROCEDURE — 3008F PR BODY MASS INDEX (BMI) DOCUMENTED: ICD-10-PCS | Mod: CPTII,S$GLB,, | Performed by: NURSE PRACTITIONER

## 2020-11-19 NOTE — TELEPHONE ENCOUNTER
----- Message from Elver Shea sent at 11/19/2020  9:32 AM CST -----  Regarding: self  Type:  Patient Returning Call    Who Called: Self    Who Left Message for Patient: Alejandra    Does the patient know what this is regarding?: no    Would the patient rather a call back or a response via [facility]? Call     Best Call Back Number: [phone]

## 2020-11-19 NOTE — TELEPHONE ENCOUNTER
----- Message from Jean ClaudeRoquemahi Garcia sent at 11/19/2020  8:11 AM CST -----  Regarding: Appointment  Contact: Patient  Type: Patient Call Back    Who called:Patient    What is the request in detail: She is experiencing ear pain that is progressing. She is requesting an urgent appt. Please advise.    Can the clinic reply by [facility]? No    Would the patient rather a call back or a response via [facility]? Call    Best call back number:[phone]    Additional Information:n/a

## 2020-11-20 RX ORDER — CIPROFLOXACIN AND DEXAMETHASONE 3; 1 MG/ML; MG/ML
4 SUSPENSION/ DROPS AURICULAR (OTIC) 2 TIMES DAILY
Qty: 7.5 ML | Refills: 0 | Status: SHIPPED | OUTPATIENT
Start: 2020-11-20 | End: 2020-11-30

## 2020-11-20 NOTE — PROGRESS NOTES
Subjective:      Chelsie Green is a 30 y.o. female who was self-referred for ear pain.  Chelsie presented to clinic today with complaints of right ear pain that started Saturday night and has worsened since. She rates it 4/10 for pain right now but states it can increase to 8/10. She reports pain behind the ear that increases when she closes her jaw. Her last ear infection was last year when she was pregnant but does not normally get ear infections. She does admit to occasional Qtip use. She tried to get Ciprodex drops into her ear last night and was unable to. Nothing has made her ear pain worse or better. Denies ear drainage, hearing loss, tinnitus, fever and recent illness.     There is not a family history of hearing loss at a young age.  There is not a prior history of ear surgery.  There is not a prior history of ear infections .  She denies a history of significant noise exposure.  She does not wear hearing aids currently.  She has not had a hearing test recently.       Past Medical History  She has a past medical history of Abnormal Pap smear of cervix, Heart murmur, and Scoliosis.    Past Surgical History  She has a past surgical history that includes TONSILLECTOMY, ADENOIDECTOMY; Nose surgery; breast augmentation; Breast surgery; and Cosmetic surgery.    Family History  Her family history includes Hypertension in her father; Stroke in her mother.    Social History  She reports that she has never smoked. She has never used smokeless tobacco. She reports current alcohol use of about 2.0 standard drinks of alcohol per week. She reports that she does not use drugs.    Allergies  She has No Known Allergies.    Medications  She has a current medication list which includes the following prescription(s): drospirenone (contraceptive), estradiol, ivermectin, l norgest/e.estradiol-e.estrad, spironolactone, tretinoin, amoxicillin, ciprofloxacin-dexamethasone 0.3-0.1%, and prenatal 105-iron-folic ac-dha.    Review  "of Systems     Constitutional: Negative for chills, fatigue and fever.      HENT: Positive for ear pain.  Negative for ear discharge, ear infection, facial swelling, hearing loss, mouth sores, nosebleeds, postnasal drip, ringing in the ears, runny nose, sinus infection, sinus pressure, sore throat, stuffy nose, tonsil infection, dental problems, trouble swallowing and voice change.      Eyes:  Negative for eye drainage.     Respiratory:  Negative for cough and shortness of breath.      Cardiovascular:  Negative for chest pain.     Gastrointestinal:  Negative for abdominal pain, acid reflux, constipation, diarrhea, heartburn and vomiting.     Skin: Negative for rash.     Neurological: Negative for dizziness, headaches, light-headedness, seizures and tremors.      Hematologic: Negative for bruises/bleeds easily and swollen glands.      Psychiatric: Negative for decreased concentration, depression, nervous/anxious and sleep disturbance.                Objective:     Temp 98.2 °F (36.8 °C)   Ht 5' 4" (1.626 m)   Wt 68.3 kg (150 lb 11 oz)   BMI 25.86 kg/m²      Constitutional:   She appears well-developed. She is cooperative. Normal speech.  No hoarse voice.      Head:  Normocephalic. Salivary glands normal.  Facial strength is normal.      Ears:    Right Ear: No drainage or tenderness. Tympanic membrane is not perforated. No middle ear effusion. No decreased hearing is noted.   Left Ear: No drainage or tenderness. Tympanic membrane is not perforated.  No middle ear effusion. No decreased hearing is noted.   Ears:      Nose:  No mucosal edema, rhinorrhea, septal deviation or polyps. No epistaxis. Turbinates normal, no turbinate masses and no turbinate hypertrophy.  Right sinus exhibits no maxillary sinus tenderness and no frontal sinus tenderness. Left sinus exhibits no maxillary sinus tenderness and no frontal sinus tenderness.     Mouth/Throat  Oropharynx clear and moist without lesions or asymmetry and normal uvula " midline. She does not have dentures. Normal dentition. No oral lesions or mucous membrane lesions. No oropharyngeal exudate or posterior oropharyngeal erythema.     Neck:  Neck normal without thyromegaly masses, asymmetry, normal tracheal structure, crepitus, and tenderness, trachea normal and no adenopathy. Normal range of motion present.     She has no cervical adenopathy.     Pulmonary/Chest:   Effort normal.     Psychiatric:   She has a normal mood and affect. Her speech is normal and behavior is normal.     Neurological:   No cranial nerve deficit.     Skin:   No rash noted.       Procedure    Exam under microscopy    Data Reviewed    WBC (K/uL)   Date Value   10/24/2020 5.98     Platelets (K/uL)   Date Value   10/24/2020 244      Creatinine (mg/dL)   Date Value   10/24/2020 0.8     TSH (uIU/mL)   Date Value   02/20/2018 1.906     Glucose (mg/dL)   Date Value   10/24/2020 101     No results found for: HGBA1C    Assessment:     1. Otitis externa, unspecified chronicity, unspecified laterality, unspecified type    2. Jaw pain         Plan:     I had a long discussion with the patient regarding her condition and the further workup and management options.   Otitis externa, bilat: Prescribed Ciprodex drops twice a day for 10 days. Patient instructed to pull pinna up and pump tragus to assist instillation of drops.   Jaw/ear pain: Instructed patient to use ibuprofen prn to reduce inflammation of the jaw joint and ease ear pain,  place an ice pack on jaw joint (in front of ear) 4 times a day, and avoid hard or crunchy food.  Patient instructed to contact me with questions/concerns and worsening/new symptoms.   Patient has a trip to [place] leaving on 12/6 so I will see her before she leaves to ensure improvement or resolution of otitis externa/ear pain  .     Follow up in about 2 weeks (around 12/3/2020).

## 2020-11-25 ENCOUNTER — PATIENT MESSAGE (OUTPATIENT)
Dept: OTOLARYNGOLOGY | Facility: CLINIC | Age: 30
End: 2020-11-25

## 2020-11-30 ENCOUNTER — LAB VISIT (OUTPATIENT)
Dept: INTERNAL MEDICINE | Facility: CLINIC | Age: 30
End: 2020-11-30
Payer: COMMERCIAL

## 2020-11-30 DIAGNOSIS — Z03.818 ENCOUNTER FOR OBSERVATION FOR SUSPECTED EXPOSURE TO OTHER BIOLOGICAL AGENTS RULED OUT: ICD-10-CM

## 2020-11-30 PROCEDURE — U0003 INFECTIOUS AGENT DETECTION BY NUCLEIC ACID (DNA OR RNA); SEVERE ACUTE RESPIRATORY SYNDROME CORONAVIRUS 2 (SARS-COV-2) (CORONAVIRUS DISEASE [COVID-19]), AMPLIFIED PROBE TECHNIQUE, MAKING USE OF HIGH THROUGHPUT TECHNOLOGIES AS DESCRIBED BY CMS-2020-01-R: HCPCS

## 2020-12-01 ENCOUNTER — PATIENT MESSAGE (OUTPATIENT)
Dept: INTERNAL MEDICINE | Facility: CLINIC | Age: 30
End: 2020-12-01

## 2020-12-01 LAB — SARS-COV-2 RNA RESP QL NAA+PROBE: NOT DETECTED

## 2020-12-16 ENCOUNTER — IMMUNIZATION (OUTPATIENT)
Dept: OBSTETRICS AND GYNECOLOGY | Facility: CLINIC | Age: 30
End: 2020-12-16
Payer: COMMERCIAL

## 2020-12-16 DIAGNOSIS — Z23 NEED FOR VACCINATION: ICD-10-CM

## 2020-12-16 PROCEDURE — 0001A COVID-19, MRNA, LNP-S, PF, 30 MCG/0.3 ML DOSE VACCINE: CPT | Mod: CV19,,, | Performed by: FAMILY MEDICINE

## 2020-12-16 PROCEDURE — 0001A COVID-19, MRNA, LNP-S, PF, 30 MCG/0.3 ML DOSE VACCINE: ICD-10-PCS | Mod: CV19,,, | Performed by: FAMILY MEDICINE

## 2020-12-16 PROCEDURE — 91300 COVID-19, MRNA, LNP-S, PF, 30 MCG/0.3 ML DOSE VACCINE: ICD-10-PCS | Mod: ,,, | Performed by: FAMILY MEDICINE

## 2020-12-16 PROCEDURE — 91300 COVID-19, MRNA, LNP-S, PF, 30 MCG/0.3 ML DOSE VACCINE: CPT | Mod: ,,, | Performed by: FAMILY MEDICINE

## 2021-01-06 ENCOUNTER — IMMUNIZATION (OUTPATIENT)
Dept: OBSTETRICS AND GYNECOLOGY | Facility: CLINIC | Age: 31
End: 2021-01-06
Payer: COMMERCIAL

## 2021-01-06 DIAGNOSIS — Z23 NEED FOR VACCINATION: ICD-10-CM

## 2021-01-06 PROCEDURE — 0002A COVID-19, MRNA, LNP-S, PF, 30 MCG/0.3 ML DOSE VACCINE: CPT | Mod: PBBFAC

## 2021-01-06 PROCEDURE — 91300 COVID-19, MRNA, LNP-S, PF, 30 MCG/0.3 ML DOSE VACCINE: CPT | Mod: PBBFAC

## 2021-02-26 DIAGNOSIS — B37.9 CANDIDIASIS: Primary | ICD-10-CM

## 2021-02-26 RX ORDER — FLUCONAZOLE 150 MG/1
150 TABLET ORAL DAILY
Qty: 2 TABLET | Refills: 1 | Status: SHIPPED | OUTPATIENT
Start: 2021-02-26 | End: 2021-02-28

## 2021-04-29 ENCOUNTER — TELEPHONE (OUTPATIENT)
Dept: INTERNAL MEDICINE | Facility: CLINIC | Age: 31
End: 2021-04-29

## 2021-04-29 DIAGNOSIS — Z91.89 AT INCREASED RISK OF EXPOSURE TO COVID-19 VIRUS: Primary | ICD-10-CM

## 2021-05-17 ENCOUNTER — LAB VISIT (OUTPATIENT)
Dept: FAMILY MEDICINE | Facility: CLINIC | Age: 31
End: 2021-05-17
Payer: COMMERCIAL

## 2021-05-17 DIAGNOSIS — Z91.89 AT INCREASED RISK OF EXPOSURE TO COVID-19 VIRUS: ICD-10-CM

## 2021-05-17 LAB — SARS-COV-2 RNA RESP QL NAA+PROBE: NOT DETECTED

## 2021-05-17 PROCEDURE — U0003 INFECTIOUS AGENT DETECTION BY NUCLEIC ACID (DNA OR RNA); SEVERE ACUTE RESPIRATORY SYNDROME CORONAVIRUS 2 (SARS-COV-2) (CORONAVIRUS DISEASE [COVID-19]), AMPLIFIED PROBE TECHNIQUE, MAKING USE OF HIGH THROUGHPUT TECHNOLOGIES AS DESCRIBED BY CMS-2020-01-R: HCPCS | Performed by: INTERNAL MEDICINE

## 2021-05-17 PROCEDURE — U0005 INFEC AGEN DETEC AMPLI PROBE: HCPCS | Performed by: INTERNAL MEDICINE

## 2021-07-30 DIAGNOSIS — Z30.41 ENCOUNTER FOR SURVEILLANCE OF CONTRACEPTIVE PILLS: ICD-10-CM

## 2021-07-30 RX ORDER — LEVONORGESTREL / ETHINYL ESTRADIOL AND ETHINYL ESTRADIOL 150-30(84)
1 KIT ORAL DAILY
Qty: 91 EACH | Refills: 5 | Status: SHIPPED | OUTPATIENT
Start: 2021-07-30 | End: 2022-02-08

## 2021-09-27 ENCOUNTER — IMMUNIZATION (OUTPATIENT)
Dept: OBSTETRICS AND GYNECOLOGY | Facility: CLINIC | Age: 31
End: 2021-09-27
Payer: COMMERCIAL

## 2021-09-27 DIAGNOSIS — Z23 NEED FOR VACCINATION: Primary | ICD-10-CM

## 2021-09-27 PROCEDURE — 91300 COVID-19, MRNA, LNP-S, PF, 30 MCG/0.3 ML DOSE VACCINE: CPT | Mod: PBBFAC | Performed by: FAMILY MEDICINE

## 2021-09-27 PROCEDURE — 0003A COVID-19, MRNA, LNP-S, PF, 30 MCG/0.3 ML DOSE VACCINE: CPT | Mod: PBBFAC | Performed by: FAMILY MEDICINE

## 2021-11-16 ENCOUNTER — LAB VISIT (OUTPATIENT)
Dept: LAB | Facility: HOSPITAL | Age: 31
End: 2021-11-16
Attending: OBSTETRICS & GYNECOLOGY
Payer: COMMERCIAL

## 2021-11-16 ENCOUNTER — PATIENT MESSAGE (OUTPATIENT)
Dept: OBSTETRICS AND GYNECOLOGY | Facility: CLINIC | Age: 31
End: 2021-11-16
Payer: COMMERCIAL

## 2021-11-16 DIAGNOSIS — Z32.01 POSITIVE PREGNANCY TEST: ICD-10-CM

## 2021-11-16 DIAGNOSIS — Z32.01 POSITIVE PREGNANCY TEST: Primary | ICD-10-CM

## 2021-11-16 LAB — HCG INTACT+B SERPL-ACNC: <1.2 MIU/ML

## 2021-11-16 PROCEDURE — 84702 CHORIONIC GONADOTROPIN TEST: CPT | Performed by: OBSTETRICS & GYNECOLOGY

## 2021-11-16 PROCEDURE — 36415 COLL VENOUS BLD VENIPUNCTURE: CPT | Performed by: OBSTETRICS & GYNECOLOGY

## 2021-11-16 PROCEDURE — 84144 ASSAY OF PROGESTERONE: CPT | Performed by: OBSTETRICS & GYNECOLOGY

## 2021-11-17 LAB — PROGEST SERPL-MCNC: 3.8 NG/ML

## 2022-01-11 ENCOUNTER — TELEPHONE (OUTPATIENT)
Dept: OBSTETRICS AND GYNECOLOGY | Facility: CLINIC | Age: 32
End: 2022-01-11
Payer: COMMERCIAL

## 2022-01-11 DIAGNOSIS — Z34.90 PREGNANCY: Primary | ICD-10-CM

## 2022-02-08 ENCOUNTER — PROCEDURE VISIT (OUTPATIENT)
Dept: OBSTETRICS AND GYNECOLOGY | Facility: CLINIC | Age: 32
End: 2022-02-08
Payer: COMMERCIAL

## 2022-02-08 ENCOUNTER — OFFICE VISIT (OUTPATIENT)
Dept: OBSTETRICS AND GYNECOLOGY | Facility: CLINIC | Age: 32
End: 2022-02-08
Payer: COMMERCIAL

## 2022-02-08 VITALS
HEIGHT: 64 IN | DIASTOLIC BLOOD PRESSURE: 67 MMHG | WEIGHT: 135.38 LBS | SYSTOLIC BLOOD PRESSURE: 143 MMHG | BODY MASS INDEX: 23.11 KG/M2

## 2022-02-08 DIAGNOSIS — Z32.01 POSITIVE PREGNANCY TEST: ICD-10-CM

## 2022-02-08 DIAGNOSIS — O21.9 NAUSEA AND VOMITING IN PREGNANCY: ICD-10-CM

## 2022-02-08 DIAGNOSIS — N91.4 SECONDARY OLIGOMENORRHEA: Primary | ICD-10-CM

## 2022-02-08 DIAGNOSIS — Z34.90 PREGNANCY: ICD-10-CM

## 2022-02-08 DIAGNOSIS — Z12.4 PAP SMEAR FOR CERVICAL CANCER SCREENING: ICD-10-CM

## 2022-02-08 PROBLEM — Z34.03 SUPERVISION OF NORMAL FIRST PREGNANCY IN THIRD TRIMESTER: Status: RESOLVED | Noted: 2019-09-27 | Resolved: 2022-02-08

## 2022-02-08 PROBLEM — O23.41 GROUP B STREPTOCOCCUS URINARY TRACT INFECTION AFFECTING PREGNANCY IN FIRST TRIMESTER: Status: RESOLVED | Noted: 2019-05-06 | Resolved: 2022-02-08

## 2022-02-08 PROBLEM — O16.3 ELEVATED BLOOD PRESSURE AFFECTING PREGNANCY IN THIRD TRIMESTER, ANTEPARTUM: Status: RESOLVED | Noted: 2019-12-04 | Resolved: 2022-02-08

## 2022-02-08 PROBLEM — B95.1 GROUP B STREPTOCOCCUS URINARY TRACT INFECTION AFFECTING PREGNANCY IN FIRST TRIMESTER: Status: RESOLVED | Noted: 2019-05-06 | Resolved: 2022-02-08

## 2022-02-08 LAB
B-HCG UR QL: POSITIVE
CTP QC/QA: YES

## 2022-02-08 PROCEDURE — 3077F PR MOST RECENT SYSTOLIC BLOOD PRESSURE >= 140 MM HG: ICD-10-PCS | Mod: CPTII,S$GLB,, | Performed by: NURSE PRACTITIONER

## 2022-02-08 PROCEDURE — 99213 OFFICE O/P EST LOW 20 MIN: CPT | Mod: S$GLB,,, | Performed by: NURSE PRACTITIONER

## 2022-02-08 PROCEDURE — 3008F BODY MASS INDEX DOCD: CPT | Mod: CPTII,S$GLB,, | Performed by: NURSE PRACTITIONER

## 2022-02-08 PROCEDURE — 88141 PR  CYTOPATH CERV/VAG INTERPRET: ICD-10-PCS | Mod: ,,, | Performed by: PATHOLOGY

## 2022-02-08 PROCEDURE — 81025 URINE PREGNANCY TEST: CPT | Mod: S$GLB,,, | Performed by: NURSE PRACTITIONER

## 2022-02-08 PROCEDURE — 1159F PR MEDICATION LIST DOCUMENTED IN MEDICAL RECORD: ICD-10-PCS | Mod: CPTII,S$GLB,, | Performed by: NURSE PRACTITIONER

## 2022-02-08 PROCEDURE — 1160F PR REVIEW ALL MEDS BY PRESCRIBER/CLIN PHARMACIST DOCUMENTED: ICD-10-PCS | Mod: CPTII,S$GLB,, | Performed by: NURSE PRACTITIONER

## 2022-02-08 PROCEDURE — 3078F PR MOST RECENT DIASTOLIC BLOOD PRESSURE < 80 MM HG: ICD-10-PCS | Mod: CPTII,S$GLB,, | Performed by: NURSE PRACTITIONER

## 2022-02-08 PROCEDURE — 1160F RVW MEDS BY RX/DR IN RCRD: CPT | Mod: CPTII,S$GLB,, | Performed by: NURSE PRACTITIONER

## 2022-02-08 PROCEDURE — 99213 PR OFFICE/OUTPT VISIT, EST, LEVL III, 20-29 MIN: ICD-10-PCS | Mod: S$GLB,,, | Performed by: NURSE PRACTITIONER

## 2022-02-08 PROCEDURE — 76801 US OB/GYN PROCEDURE (VIEWPOINT): ICD-10-PCS | Mod: S$GLB,,, | Performed by: OBSTETRICS & GYNECOLOGY

## 2022-02-08 PROCEDURE — 87086 URINE CULTURE/COLONY COUNT: CPT | Performed by: NURSE PRACTITIONER

## 2022-02-08 PROCEDURE — 87491 CHLMYD TRACH DNA AMP PROBE: CPT | Performed by: NURSE PRACTITIONER

## 2022-02-08 PROCEDURE — 88141 CYTOPATH C/V INTERPRET: CPT | Mod: ,,, | Performed by: PATHOLOGY

## 2022-02-08 PROCEDURE — 87591 N.GONORRHOEAE DNA AMP PROB: CPT | Performed by: NURSE PRACTITIONER

## 2022-02-08 PROCEDURE — 99999 PR PBB SHADOW E&M-EST. PATIENT-LVL III: ICD-10-PCS | Mod: PBBFAC,,, | Performed by: NURSE PRACTITIONER

## 2022-02-08 PROCEDURE — 99999 PR PBB SHADOW E&M-EST. PATIENT-LVL III: CPT | Mod: PBBFAC,,, | Performed by: NURSE PRACTITIONER

## 2022-02-08 PROCEDURE — 1159F MED LIST DOCD IN RCRD: CPT | Mod: CPTII,S$GLB,, | Performed by: NURSE PRACTITIONER

## 2022-02-08 PROCEDURE — 87624 HPV HI-RISK TYP POOLED RSLT: CPT | Performed by: NURSE PRACTITIONER

## 2022-02-08 PROCEDURE — 76801 OB US < 14 WKS SINGLE FETUS: CPT | Mod: S$GLB,,, | Performed by: OBSTETRICS & GYNECOLOGY

## 2022-02-08 PROCEDURE — 3008F PR BODY MASS INDEX (BMI) DOCUMENTED: ICD-10-PCS | Mod: CPTII,S$GLB,, | Performed by: NURSE PRACTITIONER

## 2022-02-08 PROCEDURE — 3077F SYST BP >= 140 MM HG: CPT | Mod: CPTII,S$GLB,, | Performed by: NURSE PRACTITIONER

## 2022-02-08 PROCEDURE — 81025 POCT URINE PREGNANCY: ICD-10-PCS | Mod: S$GLB,,, | Performed by: NURSE PRACTITIONER

## 2022-02-08 PROCEDURE — 3078F DIAST BP <80 MM HG: CPT | Mod: CPTII,S$GLB,, | Performed by: NURSE PRACTITIONER

## 2022-02-08 PROCEDURE — 88142 CYTOPATH C/V THIN LAYER: CPT | Performed by: PATHOLOGY

## 2022-02-08 RX ORDER — ONDANSETRON 4 MG/1
4 TABLET, ORALLY DISINTEGRATING ORAL EVERY 6 HOURS PRN
Qty: 30 TABLET | Refills: 0 | Status: SHIPPED | OUTPATIENT
Start: 2022-02-08 | End: 2022-03-17 | Stop reason: SDUPTHER

## 2022-02-08 NOTE — PATIENT INSTRUCTIONS
LABOR AND DELIVERY PHONE NUMBER, [phone] (OPEN 24/7, LOCATED ON 6TH FLOOR OF HOSPITAL)  [address] PHONE NUMBER, [phone] (OPEN MON-FRI, 8a-5p)    1) Eat small frequent meals through the day versus three large meals  2) Try ginger ale or sprite to help settle the stomach   3) Eat crackers or dry toast before getting out of bed in the morning   4) Stay hydrated by drinking plenty of water-do not immediately eat or drink something after vomiting. Give your stomach a rest for 20-30 minutes. Slowly reintroduce ice chips, small sips water, crackers, etc.    5) Try OTC Unisom (use the tablets that have doxylamine not diphenhydramine in them, can use generic brands like Equate) and vitamin B6  (25 mg, can find on [employer]) together at night before bed. This can help with the nausea in the morning and is safe to use during pregnancy. You can also take the vitamin B6 alone, every 8 hours to help with the nausea.     If you are unable to keep anything down and constantly vomiting for more that 24 hours, let the office know so that dehydration can be avoided. We would recommend being seen in the emergency department if this is the case.     Call [phone] to see about coverage and any out of pocket costs regarding the [id number] (MT21) testing. This is done any day after 11 weeks and is blood work only. It checks for any chromosomal abnormalities like Down Syndrome. You can also find out the sex of the baby if you choose to know. Once you find out coverage and decide to proceed, send either myself or your doctor a message and we can see what date you can do it. It is done at our second floor lab at [facility].

## 2022-02-08 NOTE — PROGRESS NOTES
"  CC: Positive Pregnancy Test    HISTORY OF PRESENT ILLNESS:    Chelsie Green is a 31 y.o. female, ,  Presents today for a routine exam complaining of amenorrhea and positive home urine pregnancy test.  Patient's last menstrual period was 12/15/2021.  New pt to me- second pregnancy. No vaginal bleeding. No pelvic pain. Having daily nausea and food aversions. Trying to eat small snacks throughout the day. Taking Unisom/B6 combo at night which does provide mild relief but makes her drowsy. Able to hold down water. Taking a prenatal vitamin. Has a 1 yo boy at home. Dating scan today-fu with Sophie. Does want aneuploidy screening.    LMP: 12/15/21  EGA: 7w6d  EDC: 22    ROS:  GENERAL: No weight changes. No swelling. + fatigue. No fever. + nausea  CARDIOVASCULAR: No chest pain. No shortness of breath. No leg cramps.   NEUROLOGICAL: No headaches. No vision changes.  BREASTS: No pain. No lumps. No discharge.  ABDOMEN: No pain. No diarrhea. + constipation.  REPRODUCTIVE: No abnormal bleeding.   VULVA: No pain. No lesions. No itching.  VAGINA: No relaxation. No itching. No odor. No discharge. No lesions.  URINARY: No incontinence. No nocturia. No frequency. No dysuria.    MEDICATIONS AND ALLERGIES:  Reviewed    COMPREHENSIVE GYN HISTORY:  PAP History: history of ascus  Infection History: Denies STDs. Denies PID.  Benign History: Denies uterine fibroids. Denies ovarian cysts. Denies endometriosis. Denies other conditions.  Cancer History: Denies cervical cancer. Denies uterine cancer or hyperplasia. Denies ovarian cancer. Denies vulvar cancer or pre-cancer. Denies vaginal cancer or pre-cancer. Denies breast cancer. Denies colon cancer.  Sexual Activity History: Reports currently being sexually active  Menstrual History: None.  Contraception: None    BP (!) 143/67   Ht 5' 4" (1.626 m)   Wt 61.4 kg (135 lb 5.8 oz)   LMP 12/15/2021   Breastfeeding Yes   BMI 23.23 kg/m²     PE:  AFFECT: Calm, alert and oriented " X 3. Interactive during exam  GENERAL: Appears well-nourished, well-developed, in no acute distress.  HEAD: Normocephalic, atruamatic  TEETH: Good dentition.  THYROID: No thyromegally   BREASTS: No masses, skin changes, nipple discharge or adenopathy bilaterally.  SKIN: Normal for race, warm, & dry. No lesions or rashes.  LUNGS: Easy and unlabored, clear to auscultation bilaterally.  HEART: Regular rate and rhythm   ABDOMEN: Soft and nontender without masses or organomegally.  VULVA: No lesions, masses or tenderness.  VAGINA: Moist and well rugated without lesions or discharge.  CERVIX: Moist and pink without lesions, discharge or tenderness.      UTERUS SIZE: 8 week size, nontender and without masses.  ADNEXA: No masses or tenderness.  ESTIMATE OF PELVIC CAPACITY: Adequate  EXTREMITIES: No cyanosis, clubbing or edema. No calf tenderness.  LYMPH NODES: No axillary or inguinal adenopathy.    PROCEDURES:  UPT Positive  Genprobe  Pap    ASSESSMENT/PLAN:  Amenorrhea  Positive urine pregnancy test -  Routine prenatal care    Nausea and vomiting in pregnancy    -  Education regarding lifestyle and dietary modifications    -  Advised use of B6/Unisom. Pt will notify us if no relief/worsening symptoms, will consider Zofran if needed.    1st TRIMESTER COUNSELING: Discussed all, booklet provided:  Common complaints of pregnancy  HIV and other routine prenatal tests including  genetic screening  Risk factors identified by prenatal history  Oriented to practice - discussed anticipated course of prenatal care & indications for Ultrasound  Childbirth classes/Hospital facilities   Nutrition and weight gain counseling  Toxoplasmosis precautions (Cats/Raw Meat)  Sexual activity and exercise  Environmental/Work hazards  Travel  Tobacco (Ask, Advise, Assess, Assist, and Arrange), as well as alcohol and drug use  Use of any medications (Including supplements, Vitamins, Herbs, or OTC Drugs)  Domestic violence  Seat belt  use    TERATOLOGY COUNSELING:   Discussed indications and options for aneuploidy screening - pamphlets given    -  Pt desires MT21 testing    FOLLOW-UP in 4 weeks with Dr. Barrios  Dating scan  IOB labs today  Pap updated  GC and urine cx pending  MT21 after 11 weeks  Rx Zofran  Start daily stool softener and miralax prn    Lakisha nAgel, NP  OB/GYN

## 2022-02-09 LAB
C TRACH DNA SPEC QL NAA+PROBE: NOT DETECTED
N GONORRHOEA DNA SPEC QL NAA+PROBE: NOT DETECTED

## 2022-02-10 LAB — BACTERIA UR CULT: NO GROWTH

## 2022-02-11 ENCOUNTER — PATIENT MESSAGE (OUTPATIENT)
Dept: OBSTETRICS AND GYNECOLOGY | Facility: CLINIC | Age: 32
End: 2022-02-11
Payer: COMMERCIAL

## 2022-02-11 LAB
HPV HR 12 DNA SPEC QL NAA+PROBE: NEGATIVE
HPV16 AG SPEC QL: POSITIVE
HPV18 DNA SPEC QL NAA+PROBE: NEGATIVE

## 2022-02-11 RX ORDER — PROMETHAZINE HYDROCHLORIDE 25 MG/1
25 TABLET ORAL EVERY 4 HOURS
Qty: 30 TABLET | Refills: 2 | Status: SHIPPED | OUTPATIENT
Start: 2022-02-11 | End: 2023-11-08

## 2022-02-14 ENCOUNTER — PATIENT MESSAGE (OUTPATIENT)
Dept: OBSTETRICS AND GYNECOLOGY | Facility: CLINIC | Age: 32
End: 2022-02-14
Payer: COMMERCIAL

## 2022-02-14 ENCOUNTER — LAB VISIT (OUTPATIENT)
Dept: LAB | Facility: HOSPITAL | Age: 32
End: 2022-02-14
Attending: NURSE PRACTITIONER
Payer: COMMERCIAL

## 2022-02-14 DIAGNOSIS — Z32.01 POSITIVE PREGNANCY TEST: ICD-10-CM

## 2022-02-14 LAB
ABO + RH BLD: NORMAL
ANION GAP SERPL CALC-SCNC: 9 MMOL/L (ref 8–16)
BASOPHILS # BLD AUTO: 0.01 K/UL (ref 0–0.2)
BASOPHILS NFR BLD: 0.1 % (ref 0–1.9)
BLD GP AB SCN CELLS X3 SERPL QL: NORMAL
BUN SERPL-MCNC: 8 MG/DL (ref 6–20)
CALCIUM SERPL-MCNC: 9.2 MG/DL (ref 8.7–10.5)
CHLORIDE SERPL-SCNC: 103 MMOL/L (ref 95–110)
CO2 SERPL-SCNC: 24 MMOL/L (ref 23–29)
CREAT SERPL-MCNC: 0.7 MG/DL (ref 0.5–1.4)
DIFFERENTIAL METHOD: NORMAL
EOSINOPHIL # BLD AUTO: 0.1 K/UL (ref 0–0.5)
EOSINOPHIL NFR BLD: 0.6 % (ref 0–8)
ERYTHROCYTE [DISTWIDTH] IN BLOOD BY AUTOMATED COUNT: 12.2 % (ref 11.5–14.5)
EST. GFR  (AFRICAN AMERICAN): >60 ML/MIN/1.73 M^2
EST. GFR  (NON AFRICAN AMERICAN): >60 ML/MIN/1.73 M^2
GLUCOSE SERPL-MCNC: 135 MG/DL (ref 70–110)
HCG INTACT+B SERPL-ACNC: NORMAL MIU/ML
HCT VFR BLD AUTO: 39.4 % (ref 37–48.5)
HGB BLD-MCNC: 13.4 G/DL (ref 12–16)
IMM GRANULOCYTES # BLD AUTO: 0.03 K/UL (ref 0–0.04)
IMM GRANULOCYTES NFR BLD AUTO: 0.4 % (ref 0–0.5)
LYMPHOCYTES # BLD AUTO: 2.1 K/UL (ref 1–4.8)
LYMPHOCYTES NFR BLD: 25.1 % (ref 18–48)
MCH RBC QN AUTO: 29.4 PG (ref 27–31)
MCHC RBC AUTO-ENTMCNC: 34 G/DL (ref 32–36)
MCV RBC AUTO: 86 FL (ref 82–98)
MONOCYTES # BLD AUTO: 0.4 K/UL (ref 0.3–1)
MONOCYTES NFR BLD: 5.1 % (ref 4–15)
NEUTROPHILS # BLD AUTO: 5.8 K/UL (ref 1.8–7.7)
NEUTROPHILS NFR BLD: 68.7 % (ref 38–73)
NRBC BLD-RTO: 0 /100 WBC
PLATELET # BLD AUTO: 207 K/UL (ref 150–450)
PMV BLD AUTO: 9.7 FL (ref 9.2–12.9)
POTASSIUM SERPL-SCNC: 3.6 MMOL/L (ref 3.5–5.1)
RBC # BLD AUTO: 4.56 M/UL (ref 4–5.4)
SODIUM SERPL-SCNC: 136 MMOL/L (ref 136–145)
WBC # BLD AUTO: 8.49 K/UL (ref 3.9–12.7)

## 2022-02-14 PROCEDURE — 36415 COLL VENOUS BLD VENIPUNCTURE: CPT | Performed by: NURSE PRACTITIONER

## 2022-02-14 PROCEDURE — 86592 SYPHILIS TEST NON-TREP QUAL: CPT | Performed by: NURSE PRACTITIONER

## 2022-02-14 PROCEDURE — 85025 COMPLETE CBC W/AUTO DIFF WBC: CPT | Performed by: NURSE PRACTITIONER

## 2022-02-14 PROCEDURE — 80048 BASIC METABOLIC PNL TOTAL CA: CPT | Performed by: NURSE PRACTITIONER

## 2022-02-14 PROCEDURE — 86787 VARICELLA-ZOSTER ANTIBODY: CPT | Performed by: NURSE PRACTITIONER

## 2022-02-14 PROCEDURE — 86762 RUBELLA ANTIBODY: CPT | Performed by: NURSE PRACTITIONER

## 2022-02-14 PROCEDURE — 87340 HEPATITIS B SURFACE AG IA: CPT | Performed by: NURSE PRACTITIONER

## 2022-02-14 PROCEDURE — 86901 BLOOD TYPING SEROLOGIC RH(D): CPT | Performed by: NURSE PRACTITIONER

## 2022-02-14 PROCEDURE — 87389 HIV-1 AG W/HIV-1&-2 AB AG IA: CPT | Performed by: NURSE PRACTITIONER

## 2022-02-14 PROCEDURE — 84702 CHORIONIC GONADOTROPIN TEST: CPT | Performed by: NURSE PRACTITIONER

## 2022-02-15 ENCOUNTER — PATIENT MESSAGE (OUTPATIENT)
Dept: OBSTETRICS AND GYNECOLOGY | Facility: CLINIC | Age: 32
End: 2022-02-15
Payer: COMMERCIAL

## 2022-02-15 LAB
FINAL PATHOLOGIC DIAGNOSIS: ABNORMAL
Lab: ABNORMAL

## 2022-02-16 ENCOUNTER — TELEPHONE (OUTPATIENT)
Dept: OBSTETRICS AND GYNECOLOGY | Facility: CLINIC | Age: 32
End: 2022-02-16
Payer: COMMERCIAL

## 2022-02-16 LAB
HBV SURFACE AG SERPL QL IA: NEGATIVE
HIV 1+2 AB+HIV1 P24 AG SERPL QL IA: NEGATIVE
RPR SER QL: NORMAL
RUBV IGG SER-ACNC: 56.2 IU/ML
RUBV IGG SER-IMP: REACTIVE
VARICELLA INTERPRETATION: POSITIVE
VARICELLA ZOSTER IGG: 2.35 ISR (ref 0–0.9)

## 2022-02-16 NOTE — TELEPHONE ENCOUNTER
----- Message from Shannon Barrios MD sent at 2/15/2022 10:37 PM CST -----  Regarding: Needs colpo  High grade pap smear with + 16.  Pt is pregnant.  Needs a colposcopy.  I have been messaging with her and I told her to let me know if she wants me to call her tomorrow evening, regardless of the time.  Please call and schedule a colpo.  It does not have to be next week.  However, if she seems very stressed and really wants it done next week, let me know and we'll find time for her.     Sophie

## 2022-02-16 NOTE — TELEPHONE ENCOUNTER
----- Message from Titus Douglass sent at 2/16/2022  2:25 PM CST -----  Regarding: Return Call Appt  Contact: KEATON JURADO [[id number]]  Type:  Patient Returning Call    Who Called: KEATON JURADO [[id number]]    Who Left Message for Patient: Vandana    Does the patient know what this is regarding?: yes    Would the patient rather a call back or a response via [facility]? call    Best Call Back Number: [phone]    Additional Information:

## 2022-02-16 NOTE — TELEPHONE ENCOUNTER
Patient has an appointment on 3/8 for her initial ob appoints. Wants to know if she can just keep that appointment and have it done then. Can come earlier if needed that day.

## 2022-02-17 ENCOUNTER — TELEPHONE (OUTPATIENT)
Dept: OBSTETRICS AND GYNECOLOGY | Facility: CLINIC | Age: 32
End: 2022-02-17
Payer: COMMERCIAL

## 2022-02-17 NOTE — TELEPHONE ENCOUNTER
Spoke with pt re pap and hpv results. All pt questions answered. She has received the hpv vaccine, her partner has not. Scheduled for colpo with Dr. Barrios in march.

## 2022-02-22 ENCOUNTER — PATIENT MESSAGE (OUTPATIENT)
Dept: OBSTETRICS AND GYNECOLOGY | Facility: CLINIC | Age: 32
End: 2022-02-22
Payer: COMMERCIAL

## 2022-03-04 ENCOUNTER — TELEPHONE (OUTPATIENT)
Dept: OBSTETRICS AND GYNECOLOGY | Facility: CLINIC | Age: 32
End: 2022-03-04
Payer: COMMERCIAL

## 2022-03-04 NOTE — TELEPHONE ENCOUNTER
Spoke to patient and informed her of negative [id number] results per Sophie. Patient does not want to know gender. Will  in envelope at next week's visit.

## 2022-03-08 ENCOUNTER — INITIAL PRENATAL (OUTPATIENT)
Dept: OBSTETRICS AND GYNECOLOGY | Facility: CLINIC | Age: 32
End: 2022-03-08
Payer: COMMERCIAL

## 2022-03-08 VITALS
SYSTOLIC BLOOD PRESSURE: 110 MMHG | BODY MASS INDEX: 23.46 KG/M2 | WEIGHT: 136.69 LBS | DIASTOLIC BLOOD PRESSURE: 60 MMHG

## 2022-03-08 DIAGNOSIS — R87.613 HGSIL (HIGH GRADE SQUAMOUS INTRAEPITHELIAL LESION) ON PAP SMEAR OF CERVIX: ICD-10-CM

## 2022-03-08 DIAGNOSIS — Z34.81 ENCOUNTER FOR SUPERVISION OF OTHER NORMAL PREGNANCY IN FIRST TRIMESTER: Primary | ICD-10-CM

## 2022-03-08 PROCEDURE — 88342 IMHCHEM/IMCYTCHM 1ST ANTB: CPT | Mod: 26,,, | Performed by: PATHOLOGY

## 2022-03-08 PROCEDURE — 88305 TISSUE EXAM BY PATHOLOGIST: ICD-10-PCS | Mod: 26,,, | Performed by: PATHOLOGY

## 2022-03-08 PROCEDURE — 0500F INITIAL PRENATAL CARE VISIT: CPT | Mod: CPTII,S$GLB,, | Performed by: OBSTETRICS & GYNECOLOGY

## 2022-03-08 PROCEDURE — 88342 CHG IMMUNOCYTOCHEMISTRY: ICD-10-PCS | Mod: 26,,, | Performed by: PATHOLOGY

## 2022-03-08 PROCEDURE — 57455 PR COLPOSCOPY,CERVIX W/ADJ VAGINA,W/BX: ICD-10-PCS | Mod: S$GLB,,, | Performed by: OBSTETRICS & GYNECOLOGY

## 2022-03-08 PROCEDURE — 88305 TISSUE EXAM BY PATHOLOGIST: CPT | Mod: 26,,, | Performed by: PATHOLOGY

## 2022-03-08 PROCEDURE — 99999 PR PBB SHADOW E&M-EST. PATIENT-LVL II: CPT | Mod: PBBFAC,,, | Performed by: OBSTETRICS & GYNECOLOGY

## 2022-03-08 PROCEDURE — 99999 PR PBB SHADOW E&M-EST. PATIENT-LVL II: ICD-10-PCS | Mod: PBBFAC,,, | Performed by: OBSTETRICS & GYNECOLOGY

## 2022-03-08 PROCEDURE — 88305 TISSUE EXAM BY PATHOLOGIST: CPT | Mod: 59 | Performed by: PATHOLOGY

## 2022-03-08 PROCEDURE — 0500F PR INITIAL PRENATAL CARE VISIT: ICD-10-PCS | Mod: CPTII,S$GLB,, | Performed by: OBSTETRICS & GYNECOLOGY

## 2022-03-08 PROCEDURE — 88342 IMHCHEM/IMCYTCHM 1ST ANTB: CPT | Mod: 59 | Performed by: PATHOLOGY

## 2022-03-08 PROCEDURE — 57455 BIOPSY OF CERVIX W/SCOPE: CPT | Mod: S$GLB,,, | Performed by: OBSTETRICS & GYNECOLOGY

## 2022-03-08 NOTE — PROCEDURES
Colposcopy    Date/Time: 3/8/2022 4:00 PM  Performed by: Shannon Barrios MD  Authorized by: Shannon Barrios MD     Consent Done?:  Yes (Written)  Timeout:Immediately prior to procedure a time out was called to verify the correct patient, procedure, equipment, support staff and site/side marked as required    Colposcopy Site:  Cervix  Position:  Supine  Acrowhite Lesion? Yes    Atypical Vessels? Yes    Transformation Zone Adequate?: Yes    Biopsy?: Yes         Location:  Cervix ((2 00, 7 00 and 10 00))  ECC Performed?: No    LEEP Performed?: No    Estimated blood loss (cc):  15   Patient tolerated the procedure well with no immediate complications.   Post-operative instructions were provided for the patient.   Patient was discharged and will follow up if any complications occur        COLPOSCOPY:    Chelsie Green is a 32 y.o. female   presents for colposcopy.  Patient's last menstrual period was 12/15/2021..  Her most recent pap smear shows high-grade squamous intraepithelial neoplasia  (HGSIL-encompassing moderate and severe dysplasia).      The abnormal test findings were discussed, as well as HPV infection, need for colposcopy and possible biopsies to determine the plan of care, treatments available, the minimal risk of bleeding and infection with colposcopy, and alternatives to colposcopy and she agrees to proceed.      UPT is negative    COLPOSCOPY EXAM:   TIME OUT PERFORMED.     visible lesion(s) at 2 and 7 o'clock, acetowhite lesion(s) noted at 2, 7 and 10 o'clock and abnormal vessels noted at 7 o'clock    Biopsy was taken at 2, 7 and 10 o'clock.  ECC was not performed, as patient is pregnant  Hemostasis was adequate with application of Monsel's solution.  The speculum was removed.  The patient did tolerate the procedure well.    All collected specimens sent to pathology for histologic analysis.    Post-colposcopy counseling:  The patient was instructed to manage post-colposcopy cramping with NSAIDs  or Tylenol, or with a prescription per the medication card.  Avoid intercourse, douching, or tampons in the vagina for at least 2-3 days.  Expect a clumpy blackish discharge due to Monsel's solution application for several days.  Report heavy bleeding, worsening pain or pain that does not respond to above medications, or foul-smelling vaginal discharge. HPV vaccine recommended according to FDA age guidelines.  Importance of follow-up stressed.      Follow up based on colposcopy results.      Shannon Barrios MD  Obstetrics & Gynecology

## 2022-03-08 NOTE — PROGRESS NOTES
Pt doing well.  Denies any issues at this time.  M21 was normal.  Having a girl! Signed up for [facility].  Discussed pap smear.  HGSIL, + HPV 16.  Will do colposcopy today.  Will have RTC in 4 weeks.  (See procedure note for colposcopy)

## 2022-03-14 NOTE — PROGRESS NOTES
60 yo male with a PMH of perforated sigmoid diverticulitis s/p sigmoid colectomy & end colostomy (12/2015), subsequent exploratory laparotomy with extensive lysis of adhesions and colostomy takedown with appendectomy (6/2016) and laparoscopic cholecystectomy (11/2020) who is presenting to the ED with a chief complaint of abdominal pain. He states that the abdominal pain started yesterday, is located in the periumbilical region, and is associated with nausea and vomiting. He last had a bowel movement 2 days ago, and denies passing flatus.     In the ED, labs were notable for a WBC of 13. CT scan demonstrated a small bowel distention with lower abdominal transition, questionable early/partial obstruction.    CT A/P:  Small bowel distention with lower abdominal transition, question   early/partial obstruction.    Pt admitted and managed conservatively.  He began spontaneously passing flatus and having active BM.  His diet was advanced as tolerated.  Pt currently tolerating regular diet, voids, no complaints.      Pt stable for d/c.    Length of time preparing discharge > 30 minutes   Pt doing well.  Denies any issues at this time. Denies regular CTX, VB, VD, LOF.  + FM.  Continue PNV.  Labor precautions reviewed.  Breast pump Rx done.  Plan for induction 12/16 @2000.  Pt to RTC in 2 week.       60 yo male with a PMH of perforated sigmoid diverticulitis s/p sigmoid colectomy & end colostomy (12/2015), subsequent exploratory laparotomy with extensive lysis of adhesions and colostomy takedown with appendectomy (6/2016) and laparoscopic cholecystectomy (11/2020) who is presenting to the ED with a chief complaint of abdominal pain. He states that the abdominal pain started yesterday, is located in the periumbilical region, and is associated with nausea and vomiting. He last had a bowel movement 2 days ago, and denies passing flatus.     In the ED, labs were notable for a WBC of 13. CT scan demonstrated a small bowel distention with lower abdominal transition, questionable early/partial obstruction.    CT A/P:  Small bowel distention with lower abdominal transition, question   early/partial obstruction.    Pt admitted and managed conservatively.  He began spontaneously passing flatus and having active BM.  His diet was advanced as tolerated.  Pt currently tolerating regular diet, voids, no complaints.      Pt stable for d/c.    Length of time preparing discharge > 30 minutes      PATIENT SEEN & EVALUATED W/ TRAUMA/ACS TEAM.  AGREE W/ DOCUMENTATION & PLAN.

## 2022-03-17 DIAGNOSIS — O21.9 NAUSEA AND VOMITING IN PREGNANCY: ICD-10-CM

## 2022-03-17 RX ORDER — ONDANSETRON 4 MG/1
4 TABLET, ORALLY DISINTEGRATING ORAL EVERY 6 HOURS PRN
Qty: 30 TABLET | Refills: 0 | Status: SHIPPED | OUTPATIENT
Start: 2022-03-17 | End: 2022-03-31

## 2022-03-18 ENCOUNTER — TELEPHONE (OUTPATIENT)
Dept: OBSTETRICS AND GYNECOLOGY | Facility: CLINIC | Age: 32
End: 2022-03-18
Payer: COMMERCIAL

## 2022-03-18 NOTE — TELEPHONE ENCOUNTER
Spoke with patient about OSVALDO 3 results.  Discussed that we plan to watch this closely during pregnancy with plans for colposcopy at 28 weeks.  All questions answered.     Shannon Barrios MD  Obstetrics & Gynecology

## 2022-03-21 LAB
FINAL PATHOLOGIC DIAGNOSIS: NORMAL
GROSS: NORMAL
Lab: NORMAL
SUPPLEMENTAL DIAGNOSIS: NORMAL

## 2022-03-23 ENCOUNTER — PATIENT MESSAGE (OUTPATIENT)
Dept: OBSTETRICS AND GYNECOLOGY | Facility: CLINIC | Age: 32
End: 2022-03-23
Payer: COMMERCIAL

## 2022-03-23 ENCOUNTER — PATIENT MESSAGE (OUTPATIENT)
Dept: ADMINISTRATIVE | Facility: OTHER | Age: 32
End: 2022-03-23
Payer: COMMERCIAL

## 2022-03-23 RX ORDER — BUTALBITAL, ACETAMINOPHEN AND CAFFEINE 50; 325; 40 MG/1; MG/1; MG/1
1 TABLET ORAL EVERY 4 HOURS PRN
Qty: 30 TABLET | Refills: 2 | Status: SHIPPED | OUTPATIENT
Start: 2022-03-23 | End: 2022-05-07

## 2022-04-05 ENCOUNTER — ROUTINE PRENATAL (OUTPATIENT)
Dept: OBSTETRICS AND GYNECOLOGY | Facility: CLINIC | Age: 32
End: 2022-04-05
Payer: COMMERCIAL

## 2022-04-05 VITALS — WEIGHT: 143.5 LBS | BODY MASS INDEX: 24.64 KG/M2 | DIASTOLIC BLOOD PRESSURE: 60 MMHG | SYSTOLIC BLOOD PRESSURE: 112 MMHG

## 2022-04-05 DIAGNOSIS — R51.9 PREGNANCY HEADACHE IN SECOND TRIMESTER: ICD-10-CM

## 2022-04-05 DIAGNOSIS — O26.892 PREGNANCY HEADACHE IN SECOND TRIMESTER: ICD-10-CM

## 2022-04-05 DIAGNOSIS — Z36.9 ANTENATAL SCREENING ENCOUNTER: ICD-10-CM

## 2022-04-05 DIAGNOSIS — Z34.82 ENCOUNTER FOR SUPERVISION OF OTHER NORMAL PREGNANCY IN SECOND TRIMESTER: Primary | ICD-10-CM

## 2022-04-05 PROCEDURE — 99999 PR PBB SHADOW E&M-EST. PATIENT-LVL II: ICD-10-PCS | Mod: PBBFAC,,, | Performed by: OBSTETRICS & GYNECOLOGY

## 2022-04-05 PROCEDURE — 0502F SUBSEQUENT PRENATAL CARE: CPT | Mod: CPTII,S$GLB,, | Performed by: OBSTETRICS & GYNECOLOGY

## 2022-04-05 PROCEDURE — 0502F PR SUBSEQUENT PRENATAL CARE: ICD-10-PCS | Mod: CPTII,S$GLB,, | Performed by: OBSTETRICS & GYNECOLOGY

## 2022-04-05 PROCEDURE — 99999 PR PBB SHADOW E&M-EST. PATIENT-LVL II: CPT | Mod: PBBFAC,,, | Performed by: OBSTETRICS & GYNECOLOGY

## 2022-04-05 RX ORDER — PROCHLORPERAZINE MALEATE 5 MG
5 TABLET ORAL EVERY 6 HOURS PRN
Qty: 30 TABLET | Refills: 2 | Status: SHIPPED | OUTPATIENT
Start: 2022-04-05 | End: 2023-04-05

## 2022-04-05 NOTE — PROGRESS NOTES
Pt doing ok.  Having a daily HA.  Fioricet only causing partial relief and doesn't last.  Will send in Rx for compazine.  Recommend MagOx daily.  NIPT negative.  Will order AFP today.  Denies regular CTX, VB, VD, LOF.  Continue PNV.  Pt to RTC in 4 weeks.  Anatomy US ordered.  Plan for repeat colposcopy at beginning of 3rd trim.

## 2022-04-06 ENCOUNTER — PATIENT MESSAGE (OUTPATIENT)
Dept: MATERNAL FETAL MEDICINE | Facility: CLINIC | Age: 32
End: 2022-04-06
Payer: COMMERCIAL

## 2022-04-26 ENCOUNTER — PATIENT MESSAGE (OUTPATIENT)
Dept: MATERNAL FETAL MEDICINE | Facility: CLINIC | Age: 32
End: 2022-04-26
Payer: COMMERCIAL

## 2022-04-27 ENCOUNTER — PROCEDURE VISIT (OUTPATIENT)
Dept: MATERNAL FETAL MEDICINE | Facility: CLINIC | Age: 32
End: 2022-04-27
Payer: COMMERCIAL

## 2022-04-27 DIAGNOSIS — Z36.4 ANTENATAL SCREENING FOR FETAL GROWTH RETARDATION USING ULTRASONICS: ICD-10-CM

## 2022-04-27 DIAGNOSIS — Z3A.19 19 WEEKS GESTATION OF PREGNANCY: ICD-10-CM

## 2022-04-27 DIAGNOSIS — Z36.3 ANTENATAL SCREENING FOR MALFORMATION USING ULTRASONICS: ICD-10-CM

## 2022-04-27 DIAGNOSIS — Z34.82 ENCOUNTER FOR SUPERVISION OF OTHER NORMAL PREGNANCY IN SECOND TRIMESTER: ICD-10-CM

## 2022-04-27 PROCEDURE — 76805 OB US >/= 14 WKS SNGL FETUS: CPT | Mod: S$GLB,,, | Performed by: OBSTETRICS & GYNECOLOGY

## 2022-04-27 PROCEDURE — 76805 PR US, OB 14+WKS, TRANSABD, SINGLE GESTATION: ICD-10-PCS | Mod: S$GLB,,, | Performed by: OBSTETRICS & GYNECOLOGY

## 2022-05-02 ENCOUNTER — LAB VISIT (OUTPATIENT)
Dept: LAB | Facility: HOSPITAL | Age: 32
End: 2022-05-02
Attending: INTERNAL MEDICINE
Payer: COMMERCIAL

## 2022-05-02 ENCOUNTER — PATIENT MESSAGE (OUTPATIENT)
Dept: OBSTETRICS AND GYNECOLOGY | Facility: CLINIC | Age: 32
End: 2022-05-02
Payer: COMMERCIAL

## 2022-05-02 DIAGNOSIS — Z36.9 ANTENATAL SCREENING ENCOUNTER: ICD-10-CM

## 2022-05-02 DIAGNOSIS — R39.9 UTI SYMPTOMS: Primary | ICD-10-CM

## 2022-05-02 DIAGNOSIS — R39.9 UTI SYMPTOMS: ICD-10-CM

## 2022-05-02 LAB
BACTERIA #/AREA URNS HPF: ABNORMAL /HPF
BILIRUB UR QL STRIP: NEGATIVE
CAOX CRY URNS QL MICRO: ABNORMAL
CLARITY UR: ABNORMAL
COLOR UR: ABNORMAL
GLUCOSE UR QL STRIP: ABNORMAL
HGB UR QL STRIP: ABNORMAL
HYALINE CASTS #/AREA URNS LPF: 0 /LPF
KETONES UR QL STRIP: ABNORMAL
LEUKOCYTE ESTERASE UR QL STRIP: ABNORMAL
MICROSCOPIC COMMENT: ABNORMAL
NITRITE UR QL STRIP: NEGATIVE
PH UR STRIP: 5 [PH] (ref 5–8)
PROT UR QL STRIP: ABNORMAL
RBC #/AREA URNS HPF: >100 /HPF (ref 0–4)
SP GR UR STRIP: >1.03 (ref 1–1.03)
SQUAMOUS #/AREA URNS HPF: 1 /HPF
URN SPEC COLLECT METH UR: ABNORMAL
UROBILINOGEN UR STRIP-ACNC: ABNORMAL EU/DL
WBC #/AREA URNS HPF: 27 /HPF (ref 0–5)

## 2022-05-02 PROCEDURE — 87086 URINE CULTURE/COLONY COUNT: CPT | Performed by: OBSTETRICS & GYNECOLOGY

## 2022-05-02 PROCEDURE — 87147 CULTURE TYPE IMMUNOLOGIC: CPT | Performed by: OBSTETRICS & GYNECOLOGY

## 2022-05-02 PROCEDURE — 81000 URINALYSIS NONAUTO W/SCOPE: CPT | Performed by: OBSTETRICS & GYNECOLOGY

## 2022-05-02 PROCEDURE — 87088 URINE BACTERIA CULTURE: CPT | Performed by: OBSTETRICS & GYNECOLOGY

## 2022-05-03 ENCOUNTER — TELEPHONE (OUTPATIENT)
Dept: OBSTETRICS AND GYNECOLOGY | Facility: CLINIC | Age: 32
End: 2022-05-03
Payer: COMMERCIAL

## 2022-05-03 DIAGNOSIS — O23.42 URINARY TRACT INFECTION IN MOTHER DURING SECOND TRIMESTER OF PREGNANCY: Primary | ICD-10-CM

## 2022-05-03 RX ORDER — AMOXICILLIN AND CLAVULANATE POTASSIUM 500; 125 MG/1; MG/1
1 TABLET, FILM COATED ORAL 2 TIMES DAILY
Qty: 10 TABLET | Refills: 0 | Status: SHIPPED | OUTPATIENT
Start: 2022-05-03 | End: 2022-05-08

## 2022-05-03 NOTE — TELEPHONE ENCOUNTER
.Spoke to patient and informed her urine culture results per Sophie. Informed patient medication was sent to the pharmacy.

## 2022-05-03 NOTE — TELEPHONE ENCOUNTER
----- Message from Julia Shepard sent at 5/3/2022  9:24 AM CDT -----  Pt is calling for results and pt is leaving the country tomorrow morning  Pt would like the next step  Pt can be contacted at [phone]

## 2022-05-04 LAB — BACTERIA UR CULT: ABNORMAL

## 2022-05-09 ENCOUNTER — TELEPHONE (OUTPATIENT)
Dept: OBSTETRICS AND GYNECOLOGY | Facility: CLINIC | Age: 32
End: 2022-05-09
Payer: COMMERCIAL

## 2022-05-09 NOTE — TELEPHONE ENCOUNTER
----- Message from Kristen Lujan sent at 5/9/2022  8:46 AM CDT -----  Regarding: rescheduled  Name of Who is Calling: Chelsie           What is the request in detail: Patient would like a call back to see if she can come after 2 on 5/31           Can the clinic reply by [facility]: No           What [facility] to [facility]: [phone]

## 2022-05-31 ENCOUNTER — LAB VISIT (OUTPATIENT)
Dept: LAB | Facility: HOSPITAL | Age: 32
End: 2022-05-31
Attending: OBSTETRICS & GYNECOLOGY
Payer: COMMERCIAL

## 2022-05-31 ENCOUNTER — ROUTINE PRENATAL (OUTPATIENT)
Dept: OBSTETRICS AND GYNECOLOGY | Facility: CLINIC | Age: 32
End: 2022-05-31
Payer: COMMERCIAL

## 2022-05-31 VITALS
BODY MASS INDEX: 26.64 KG/M2 | DIASTOLIC BLOOD PRESSURE: 62 MMHG | WEIGHT: 155.19 LBS | SYSTOLIC BLOOD PRESSURE: 114 MMHG

## 2022-05-31 DIAGNOSIS — D06.9 HIGH GRADE SQUAMOUS INTRAEPITHELIAL LESION (HGSIL), GRADE 3 CIN, ON BIOPSY OF CERVIX: ICD-10-CM

## 2022-05-31 DIAGNOSIS — Z34.82 ENCOUNTER FOR SUPERVISION OF OTHER NORMAL PREGNANCY IN SECOND TRIMESTER: Primary | ICD-10-CM

## 2022-05-31 DIAGNOSIS — Z34.82 ENCOUNTER FOR SUPERVISION OF OTHER NORMAL PREGNANCY IN SECOND TRIMESTER: ICD-10-CM

## 2022-05-31 LAB
BASOPHILS # BLD AUTO: 0.03 K/UL (ref 0–0.2)
BASOPHILS NFR BLD: 0.3 % (ref 0–1.9)
DIFFERENTIAL METHOD: NORMAL
EOSINOPHIL # BLD AUTO: 0.1 K/UL (ref 0–0.5)
EOSINOPHIL NFR BLD: 0.6 % (ref 0–8)
ERYTHROCYTE [DISTWIDTH] IN BLOOD BY AUTOMATED COUNT: 13.1 % (ref 11.5–14.5)
GLUCOSE SERPL-MCNC: 87 MG/DL (ref 70–140)
HCT VFR BLD AUTO: 37.2 % (ref 37–48.5)
HGB BLD-MCNC: 12.6 G/DL (ref 12–16)
IMM GRANULOCYTES # BLD AUTO: 0.04 K/UL (ref 0–0.04)
IMM GRANULOCYTES NFR BLD AUTO: 0.4 % (ref 0–0.5)
LYMPHOCYTES # BLD AUTO: 2.5 K/UL (ref 1–4.8)
LYMPHOCYTES NFR BLD: 27.4 % (ref 18–48)
MCH RBC QN AUTO: 30.5 PG (ref 27–31)
MCHC RBC AUTO-ENTMCNC: 33.9 G/DL (ref 32–36)
MCV RBC AUTO: 90 FL (ref 82–98)
MONOCYTES # BLD AUTO: 0.6 K/UL (ref 0.3–1)
MONOCYTES NFR BLD: 6.3 % (ref 4–15)
NEUTROPHILS # BLD AUTO: 5.8 K/UL (ref 1.8–7.7)
NEUTROPHILS NFR BLD: 65 % (ref 38–73)
NRBC BLD-RTO: 0 /100 WBC
PLATELET # BLD AUTO: 171 K/UL (ref 150–450)
PMV BLD AUTO: 10.3 FL (ref 9.2–12.9)
RBC # BLD AUTO: 4.13 M/UL (ref 4–5.4)
WBC # BLD AUTO: 9 K/UL (ref 3.9–12.7)

## 2022-05-31 PROCEDURE — 0502F PR SUBSEQUENT PRENATAL CARE: ICD-10-PCS | Mod: CPTII,S$GLB,, | Performed by: OBSTETRICS & GYNECOLOGY

## 2022-05-31 PROCEDURE — 82950 GLUCOSE TEST: CPT | Performed by: OBSTETRICS & GYNECOLOGY

## 2022-05-31 PROCEDURE — 99999 PR PBB SHADOW E&M-EST. PATIENT-LVL III: ICD-10-PCS | Mod: PBBFAC,,, | Performed by: OBSTETRICS & GYNECOLOGY

## 2022-05-31 PROCEDURE — 85025 COMPLETE CBC W/AUTO DIFF WBC: CPT | Performed by: OBSTETRICS & GYNECOLOGY

## 2022-05-31 PROCEDURE — 36415 COLL VENOUS BLD VENIPUNCTURE: CPT | Performed by: OBSTETRICS & GYNECOLOGY

## 2022-05-31 PROCEDURE — 0502F SUBSEQUENT PRENATAL CARE: CPT | Mod: CPTII,S$GLB,, | Performed by: OBSTETRICS & GYNECOLOGY

## 2022-05-31 PROCEDURE — 99999 PR PBB SHADOW E&M-EST. PATIENT-LVL III: CPT | Mod: PBBFAC,,, | Performed by: OBSTETRICS & GYNECOLOGY

## 2022-05-31 NOTE — PROGRESS NOTES
Pt doing well.  Denies any issues at this time.  Denies regular CTX, VB, VD, LOF.  + FM.  Continue PNV.  Labor precautions reviewed.  Pt to RTC in 4 weeks.  Glucose normal.  Tdap at next visit.  Will plan for colposcopy at next visit.

## 2022-06-23 ENCOUNTER — PROCEDURE VISIT (OUTPATIENT)
Dept: OBSTETRICS AND GYNECOLOGY | Facility: CLINIC | Age: 32
End: 2022-06-23
Payer: COMMERCIAL

## 2022-06-23 ENCOUNTER — CLINICAL SUPPORT (OUTPATIENT)
Dept: OBSTETRICS AND GYNECOLOGY | Facility: CLINIC | Age: 32
End: 2022-06-23
Payer: COMMERCIAL

## 2022-06-23 VITALS
BODY MASS INDEX: 27.55 KG/M2 | DIASTOLIC BLOOD PRESSURE: 70 MMHG | WEIGHT: 161.38 LBS | HEIGHT: 64 IN | SYSTOLIC BLOOD PRESSURE: 118 MMHG

## 2022-06-23 DIAGNOSIS — D06.9 HIGH GRADE SQUAMOUS INTRAEPITHELIAL LESION (HGSIL), GRADE 3 CIN, ON BIOPSY OF CERVIX: ICD-10-CM

## 2022-06-23 DIAGNOSIS — B37.9 CANDIDIASIS: Primary | ICD-10-CM

## 2022-06-23 DIAGNOSIS — Z23 NEED FOR TDAP VACCINATION: Primary | ICD-10-CM

## 2022-06-23 DIAGNOSIS — O26.842 UTERINE SIZE-DATE DISCREPANCY IN SECOND TRIMESTER: ICD-10-CM

## 2022-06-23 PROCEDURE — 99999 PR PBB SHADOW E&M-EST. PATIENT-LVL I: CPT | Mod: PBBFAC,,,

## 2022-06-23 PROCEDURE — 88305 TISSUE EXAM BY PATHOLOGIST: ICD-10-PCS | Mod: 26,,, | Performed by: PATHOLOGY

## 2022-06-23 PROCEDURE — 88305 TISSUE EXAM BY PATHOLOGIST: CPT | Mod: 26,,, | Performed by: PATHOLOGY

## 2022-06-23 PROCEDURE — 90715 TDAP VACCINE 7 YRS/> IM: CPT | Mod: S$GLB,,, | Performed by: OBSTETRICS & GYNECOLOGY

## 2022-06-23 PROCEDURE — 87801 DETECT AGNT MULT DNA AMPLI: CPT | Performed by: OBSTETRICS & GYNECOLOGY

## 2022-06-23 PROCEDURE — 88342 IMHCHEM/IMCYTCHM 1ST ANTB: CPT | Mod: 26,,, | Performed by: PATHOLOGY

## 2022-06-23 PROCEDURE — 90715 TDAP VACCINE GREATER THAN OR EQUAL TO 7YO IM: ICD-10-PCS | Mod: S$GLB,,, | Performed by: OBSTETRICS & GYNECOLOGY

## 2022-06-23 PROCEDURE — 90471 TDAP VACCINE GREATER THAN OR EQUAL TO 7YO IM: ICD-10-PCS | Mod: S$GLB,,, | Performed by: OBSTETRICS & GYNECOLOGY

## 2022-06-23 PROCEDURE — 87481 CANDIDA DNA AMP PROBE: CPT | Mod: 59 | Performed by: OBSTETRICS & GYNECOLOGY

## 2022-06-23 PROCEDURE — 88342 CHG IMMUNOCYTOCHEMISTRY: ICD-10-PCS | Mod: 26,,, | Performed by: PATHOLOGY

## 2022-06-23 PROCEDURE — 99999 PR PBB SHADOW E&M-EST. PATIENT-LVL I: ICD-10-PCS | Mod: PBBFAC,,,

## 2022-06-23 PROCEDURE — 88342 IMHCHEM/IMCYTCHM 1ST ANTB: CPT | Performed by: PATHOLOGY

## 2022-06-23 PROCEDURE — 90471 IMMUNIZATION ADMIN: CPT | Mod: S$GLB,,, | Performed by: OBSTETRICS & GYNECOLOGY

## 2022-06-23 PROCEDURE — 88305 TISSUE EXAM BY PATHOLOGIST: CPT | Performed by: PATHOLOGY

## 2022-06-23 RX ORDER — FLUCONAZOLE 150 MG/1
150 TABLET ORAL DAILY
Qty: 3 TABLET | Refills: 0 | Status: SHIPPED | OUTPATIENT
Start: 2022-06-23 | End: 2022-06-26

## 2022-06-23 NOTE — PROGRESS NOTES
Pt doing well.  Denies any issues at this time.  Pt with HGSIL/OSVALDO 3 on last colpo.  Will do colpo today.  Plan for repeat pap and colpo postpartum.  Denies regular CTX, VB, VD, LOF.  + FM.  Continue PNV.  Labor precautions reviewed.  Tdap today.  Pt to RTC in 4 weeks.  Measuring small for dates.  Will get 32 week growth US.  + FHT normal.  On exam for colpo + copious discharge consistent with yeast.  Affirm collected.  Will send Rx for diflucan.

## 2022-06-23 NOTE — PROCEDURES
Colposcopy    Date/Time: 2022 10:45 AM  Performed by: Shannon Barrios MD  Authorized by: Shannon Barrios MD     Consent Done?:  Yes (Written)  Assistants?: Yes    List of assistants:  Vandana Ward    Colposcopy Site:  Cervix  Acrowhite Lesion? Yes    Atypical Vessels: No    Transformation Zone Adequate?: Yes    Biopsy?: Yes         Location:  Cervix ((2 00, 5 00 and 10 00))  ECC Performed?: No    LEEP Performed?: No    Estimated blood loss (cc):  15   Patient tolerated the procedure well with no immediate complications.   Post-operative instructions were provided for the patient.   Patient was discharged and will follow up if any complications occur        COLPOSCOPY:    Chelsie Green is a 32 y.o. female   presents for colposcopy.  Patient's last menstrual period was 12/15/2021..  Her most recent pap smear shows high-grade squamous intraepithelial neoplasia  (HGSIL-encompassing moderate and severe dysplasia).      The abnormal test findings were discussed, as well as HPV infection, need for colposcopy and possible biopsies to determine the plan of care, treatments available, the minimal risk of bleeding and infection with colposcopy, and alternatives to colposcopy and she agrees to proceed.      UPT is negative    COLPOSCOPY EXAM:   TIME OUT PERFORMED.     acetowhite lesion(s) noted at 2, 5 and 10 o'clock    Biopsy was taken at 2, 5 and 10 o'clock.  ECC was not performed as patient is pregnant  Hemostasis was adequate with application of Monsel's solution.  The speculum was removed.  The patient did tolerate the procedure well.    All collected specimens sent to pathology for histologic analysis.    Post-colposcopy counseling:  The patient was instructed to manage post-colposcopy cramping with NSAIDs or Tylenol, or with a prescription per the medication card.  Avoid intercourse, douching, or tampons in the vagina for at least 2-3 days.  Expect a clumpy blackish discharge due to Monsel's solution  application for several days.  Report heavy bleeding, worsening pain or pain that does not respond to above medications, or foul-smelling vaginal discharge. HPV vaccine recommended according to FDA age guidelines.  Importance of follow-up stressed.      Follow up based on colposcopy results.      Shannon Barrios MD  Obstetrics & Gynecology

## 2022-06-27 DIAGNOSIS — R05.9 COUGH: Primary | ICD-10-CM

## 2022-06-27 DIAGNOSIS — R50.9 FEVER, UNSPECIFIED FEVER CAUSE: ICD-10-CM

## 2022-06-27 LAB
CTP QC/QA: YES
SARS-COV-2 AG RESP QL IA.RAPID: POSITIVE

## 2022-06-28 LAB
BACTERIAL VAGINOSIS DNA: NEGATIVE
CANDIDA GLABRATA DNA: NEGATIVE
CANDIDA KRUSEI DNA: NEGATIVE
CANDIDA RRNA VAG QL PROBE: POSITIVE
T VAGINALIS RRNA GENITAL QL PROBE: NEGATIVE

## 2022-06-30 ENCOUNTER — PATIENT MESSAGE (OUTPATIENT)
Dept: OBSTETRICS AND GYNECOLOGY | Facility: CLINIC | Age: 32
End: 2022-06-30
Payer: COMMERCIAL

## 2022-06-30 RX ORDER — FLUCONAZOLE 150 MG/1
150 TABLET ORAL ONCE
Qty: 2 TABLET | Refills: 0 | Status: SHIPPED | OUTPATIENT
Start: 2022-06-30 | End: 2022-06-30

## 2022-06-30 RX ORDER — NITROFURANTOIN 25; 75 MG/1; MG/1
100 CAPSULE ORAL 2 TIMES DAILY
Qty: 14 CAPSULE | Refills: 0 | Status: SHIPPED | OUTPATIENT
Start: 2022-06-30 | End: 2022-07-07

## 2022-07-01 LAB
FINAL PATHOLOGIC DIAGNOSIS: NORMAL
Lab: NORMAL

## 2022-07-10 ENCOUNTER — PATIENT MESSAGE (OUTPATIENT)
Dept: OBSTETRICS AND GYNECOLOGY | Facility: CLINIC | Age: 32
End: 2022-07-10
Payer: COMMERCIAL

## 2022-07-10 DIAGNOSIS — O26.893 PREGNANCY HEADACHE IN THIRD TRIMESTER: ICD-10-CM

## 2022-07-10 DIAGNOSIS — R51.9 PREGNANCY HEADACHE IN THIRD TRIMESTER: ICD-10-CM

## 2022-07-10 DIAGNOSIS — R35.0 URINARY FREQUENCY: Primary | ICD-10-CM

## 2022-07-11 ENCOUNTER — LAB VISIT (OUTPATIENT)
Dept: LAB | Facility: HOSPITAL | Age: 32
End: 2022-07-11
Attending: OBSTETRICS & GYNECOLOGY
Payer: COMMERCIAL

## 2022-07-11 ENCOUNTER — HOSPITAL ENCOUNTER (EMERGENCY)
Facility: OTHER | Age: 32
Discharge: HOME | End: 2022-07-12
Attending: OBSTETRICS & GYNECOLOGY
Payer: COMMERCIAL

## 2022-07-11 VITALS
RESPIRATION RATE: 16 BRPM | TEMPERATURE: 98 F | HEART RATE: 76 BPM | DIASTOLIC BLOOD PRESSURE: 75 MMHG | WEIGHT: 161.38 LBS | OXYGEN SATURATION: 98 % | HEIGHT: 64 IN | BODY MASS INDEX: 27.55 KG/M2 | SYSTOLIC BLOOD PRESSURE: 137 MMHG

## 2022-07-11 DIAGNOSIS — R51.9 PREGNANCY HEADACHE IN THIRD TRIMESTER: ICD-10-CM

## 2022-07-11 DIAGNOSIS — R35.0 URINARY FREQUENCY: ICD-10-CM

## 2022-07-11 DIAGNOSIS — N20.0 KIDNEY STONE COMPLICATING PREGNANCY, THIRD TRIMESTER: ICD-10-CM

## 2022-07-11 DIAGNOSIS — Z3A.30 30 WEEKS GESTATION OF PREGNANCY: ICD-10-CM

## 2022-07-11 DIAGNOSIS — M54.9 RIGHT-SIDED BACK PAIN, UNSPECIFIED BACK LOCATION, UNSPECIFIED CHRONICITY: ICD-10-CM

## 2022-07-11 DIAGNOSIS — O26.893 PREGNANCY HEADACHE IN THIRD TRIMESTER: ICD-10-CM

## 2022-07-11 DIAGNOSIS — N30.01 ACUTE CYSTITIS WITH HEMATURIA: Primary | ICD-10-CM

## 2022-07-11 DIAGNOSIS — O26.833 KIDNEY STONE COMPLICATING PREGNANCY, THIRD TRIMESTER: ICD-10-CM

## 2022-07-11 LAB
ALBUMIN SERPL BCP-MCNC: 2.9 G/DL (ref 3.5–5.2)
ALP SERPL-CCNC: 69 U/L (ref 55–135)
ALT SERPL W/O P-5'-P-CCNC: 32 U/L (ref 10–44)
ANION GAP SERPL CALC-SCNC: 7 MMOL/L (ref 8–16)
AST SERPL-CCNC: 26 U/L (ref 10–40)
BACTERIA #/AREA URNS HPF: ABNORMAL /HPF
BASOPHILS # BLD AUTO: 0.02 K/UL (ref 0–0.2)
BASOPHILS NFR BLD: 0.2 % (ref 0–1.9)
BILIRUB SERPL-MCNC: 0.3 MG/DL (ref 0.1–1)
BILIRUB UR QL STRIP: NEGATIVE
BUN SERPL-MCNC: 9 MG/DL (ref 6–20)
CALCIUM SERPL-MCNC: 9.3 MG/DL (ref 8.7–10.5)
CHLORIDE SERPL-SCNC: 104 MMOL/L (ref 95–110)
CLARITY UR: ABNORMAL
CO2 SERPL-SCNC: 25 MMOL/L (ref 23–29)
COLOR UR: YELLOW
CREAT SERPL-MCNC: 0.7 MG/DL (ref 0.5–1.4)
CREAT UR-MCNC: 148.9 MG/DL (ref 15–325)
DIFFERENTIAL METHOD: ABNORMAL
EOSINOPHIL # BLD AUTO: 0.1 K/UL (ref 0–0.5)
EOSINOPHIL NFR BLD: 0.5 % (ref 0–8)
ERYTHROCYTE [DISTWIDTH] IN BLOOD BY AUTOMATED COUNT: 12.9 % (ref 11.5–14.5)
EST. GFR  (AFRICAN AMERICAN): >60 ML/MIN/1.73 M^2
EST. GFR  (NON AFRICAN AMERICAN): >60 ML/MIN/1.73 M^2
GLUCOSE SERPL-MCNC: 119 MG/DL (ref 70–110)
GLUCOSE UR QL STRIP: NEGATIVE
HCT VFR BLD AUTO: 36.2 % (ref 37–48.5)
HGB BLD-MCNC: 12.3 G/DL (ref 12–16)
HGB UR QL STRIP: ABNORMAL
HYALINE CASTS #/AREA URNS LPF: 0 /LPF
IMM GRANULOCYTES # BLD AUTO: 0.05 K/UL (ref 0–0.04)
IMM GRANULOCYTES NFR BLD AUTO: 0.4 % (ref 0–0.5)
KETONES UR QL STRIP: NEGATIVE
LEUKOCYTE ESTERASE UR QL STRIP: ABNORMAL
LYMPHOCYTES # BLD AUTO: 2.9 K/UL (ref 1–4.8)
LYMPHOCYTES NFR BLD: 22.2 % (ref 18–48)
MCH RBC QN AUTO: 30.4 PG (ref 27–31)
MCHC RBC AUTO-ENTMCNC: 34 G/DL (ref 32–36)
MCV RBC AUTO: 90 FL (ref 82–98)
MICROSCOPIC COMMENT: ABNORMAL
MONOCYTES # BLD AUTO: 0.8 K/UL (ref 0.3–1)
MONOCYTES NFR BLD: 5.9 % (ref 4–15)
NEUTROPHILS # BLD AUTO: 9.2 K/UL (ref 1.8–7.7)
NEUTROPHILS NFR BLD: 70.8 % (ref 38–73)
NITRITE UR QL STRIP: POSITIVE
NRBC BLD-RTO: 0 /100 WBC
PH UR STRIP: 7 [PH] (ref 5–8)
PLATELET # BLD AUTO: 136 K/UL (ref 150–450)
PMV BLD AUTO: 11.4 FL (ref 9.2–12.9)
POTASSIUM SERPL-SCNC: 3.6 MMOL/L (ref 3.5–5.1)
PROT SERPL-MCNC: 6.9 G/DL (ref 6–8.4)
PROT UR QL STRIP: ABNORMAL
PROT UR-MCNC: 50 MG/DL
PROT/CREAT UR: 0.34 MG/G{CREAT} (ref 0–0.2)
RBC # BLD AUTO: 4.04 M/UL (ref 4–5.4)
RBC #/AREA URNS HPF: 50 /HPF (ref 0–4)
SODIUM SERPL-SCNC: 136 MMOL/L (ref 136–145)
SP GR UR STRIP: 1.01 (ref 1–1.03)
SQUAMOUS #/AREA URNS HPF: 5 /HPF
URN SPEC COLLECT METH UR: ABNORMAL
UROBILINOGEN UR STRIP-ACNC: NEGATIVE EU/DL
WBC # BLD AUTO: 13.05 K/UL (ref 3.9–12.7)
WBC #/AREA URNS HPF: >100 /HPF (ref 0–5)

## 2022-07-11 PROCEDURE — 87186 SC STD MICRODIL/AGAR DIL: CPT | Mod: 59 | Performed by: STUDENT IN AN ORGANIZED HEALTH CARE EDUCATION/TRAINING PROGRAM

## 2022-07-11 PROCEDURE — 96361 HYDRATE IV INFUSION ADD-ON: CPT | Mod: 59

## 2022-07-11 PROCEDURE — 99284 EMERGENCY DEPT VISIT MOD MDM: CPT | Mod: 25

## 2022-07-11 PROCEDURE — 82570 ASSAY OF URINE CREATININE: CPT | Performed by: OBSTETRICS & GYNECOLOGY

## 2022-07-11 PROCEDURE — 63600175 PHARM REV CODE 636 W HCPCS: Performed by: STUDENT IN AN ORGANIZED HEALTH CARE EDUCATION/TRAINING PROGRAM

## 2022-07-11 PROCEDURE — 81000 URINALYSIS NONAUTO W/SCOPE: CPT | Performed by: STUDENT IN AN ORGANIZED HEALTH CARE EDUCATION/TRAINING PROGRAM

## 2022-07-11 PROCEDURE — 87186 SC STD MICRODIL/AGAR DIL: CPT | Performed by: OBSTETRICS & GYNECOLOGY

## 2022-07-11 PROCEDURE — 99284 EMERGENCY DEPT VISIT MOD MDM: CPT | Mod: 25,,, | Performed by: OBSTETRICS & GYNECOLOGY

## 2022-07-11 PROCEDURE — 59025 FETAL NON-STRESS TEST: CPT

## 2022-07-11 PROCEDURE — 87086 URINE CULTURE/COLONY COUNT: CPT | Mod: 59 | Performed by: STUDENT IN AN ORGANIZED HEALTH CARE EDUCATION/TRAINING PROGRAM

## 2022-07-11 PROCEDURE — 59025 PR FETAL 2N-STRESS TEST: ICD-10-PCS | Mod: 26,,, | Performed by: OBSTETRICS & GYNECOLOGY

## 2022-07-11 PROCEDURE — 25000003 PHARM REV CODE 250: Performed by: STUDENT IN AN ORGANIZED HEALTH CARE EDUCATION/TRAINING PROGRAM

## 2022-07-11 PROCEDURE — 36415 COLL VENOUS BLD VENIPUNCTURE: CPT | Performed by: OBSTETRICS & GYNECOLOGY

## 2022-07-11 PROCEDURE — 87088 URINE BACTERIA CULTURE: CPT | Performed by: OBSTETRICS & GYNECOLOGY

## 2022-07-11 PROCEDURE — 87077 CULTURE AEROBIC IDENTIFY: CPT | Mod: 59 | Performed by: STUDENT IN AN ORGANIZED HEALTH CARE EDUCATION/TRAINING PROGRAM

## 2022-07-11 PROCEDURE — 80053 COMPREHEN METABOLIC PANEL: CPT | Performed by: OBSTETRICS & GYNECOLOGY

## 2022-07-11 PROCEDURE — 87086 URINE CULTURE/COLONY COUNT: CPT | Performed by: OBSTETRICS & GYNECOLOGY

## 2022-07-11 PROCEDURE — 59025 FETAL NON-STRESS TEST: CPT | Mod: 26,,, | Performed by: OBSTETRICS & GYNECOLOGY

## 2022-07-11 PROCEDURE — 99284 PR EMERGENCY DEPT VISIT,LEVEL IV: ICD-10-PCS | Mod: 25,,, | Performed by: OBSTETRICS & GYNECOLOGY

## 2022-07-11 PROCEDURE — 87077 CULTURE AEROBIC IDENTIFY: CPT | Performed by: OBSTETRICS & GYNECOLOGY

## 2022-07-11 PROCEDURE — 85025 COMPLETE CBC W/AUTO DIFF WBC: CPT | Performed by: OBSTETRICS & GYNECOLOGY

## 2022-07-11 PROCEDURE — 87088 URINE BACTERIA CULTURE: CPT | Mod: 59 | Performed by: STUDENT IN AN ORGANIZED HEALTH CARE EDUCATION/TRAINING PROGRAM

## 2022-07-11 RX ORDER — NITROFURANTOIN 25; 75 MG/1; MG/1
100 CAPSULE ORAL ONCE
Status: DISCONTINUED | OUTPATIENT
Start: 2022-07-11 | End: 2022-07-12 | Stop reason: HOSPADM

## 2022-07-11 RX ORDER — OXYCODONE HYDROCHLORIDE 5 MG/1
5 TABLET ORAL ONCE
Status: COMPLETED | OUTPATIENT
Start: 2022-07-11 | End: 2022-07-11

## 2022-07-11 RX ADMIN — SODIUM CHLORIDE, SODIUM LACTATE, POTASSIUM CHLORIDE, AND CALCIUM CHLORIDE 1000 ML: .6; .31; .03; .02 INJECTION, SOLUTION INTRAVENOUS at 09:07

## 2022-07-11 RX ADMIN — OXYCODONE 5 MG: 5 TABLET ORAL at 09:07

## 2022-07-12 PROCEDURE — 63600175 PHARM REV CODE 636 W HCPCS: Performed by: STUDENT IN AN ORGANIZED HEALTH CARE EDUCATION/TRAINING PROGRAM

## 2022-07-12 PROCEDURE — 96365 THER/PROPH/DIAG IV INF INIT: CPT

## 2022-07-12 RX ORDER — OXYCODONE AND ACETAMINOPHEN 5; 325 MG/1; MG/1
1 TABLET ORAL EVERY 4 HOURS PRN
Qty: 15 TABLET | Refills: 0 | Status: SHIPPED | OUTPATIENT
Start: 2022-07-12 | End: 2023-11-08

## 2022-07-12 RX ADMIN — CEFTRIAXONE 1 G: 1 INJECTION, SOLUTION INTRAVENOUS at 12:07

## 2022-07-12 NOTE — ED PROVIDER NOTES
Encounter Date: 2022       History     Chief Complaint   Patient presents with    Back Pain    Urinary Urgency     Chelsie Green is a 32 y.o. [id number] at 30w1d presents complaining of right sided flank pain. The pain is constant but also has a waxing and waning component that worsens - pain ranging from a 6 to 10. Also complains of urinary urgency and frequency that started yesterday. The pain radiates from her back to her groin. When she wipes after urinating, she notes blood on the tissue paper.  She has a history of UTIs in previous pregnancy.   This IUP is complicated by OSVALDO III, hx of gHTN in previous pregnancy, GBS UTI.  Patient denies contractions, denies vaginal bleeding, denies LOF.   Fetal Movement: normal.        Review of patient's allergies indicates:  No Known Allergies  Past Medical History:   Diagnosis Date    Abnormal Pap smear of cervix     Heart murmur     evaluated as a child, told not to be a problem    Scoliosis     didn' t need brace     Past Surgical History:   Procedure Laterality Date    breast augmentation      BREAST SURGERY      COSMETIC SURGERY      NOSE SURGERY      correct fracture of nose    TONSILLECTOMY, ADENOIDECTOMY       Family History   Problem Relation Age of Onset    Stroke Mother     Hypertension Father     Breast cancer Neg Hx     Colon cancer Neg Hx     Ovarian cancer Neg Hx     Cancer Neg Hx      Social History     Tobacco Use    Smoking status: Never Smoker    Smokeless tobacco: Never Used   Substance Use Topics    Alcohol use: Not Currently     Alcohol/week: 2.0 standard drinks     Types: 2 Glasses of wine per week     Comment: once a week    Drug use: No     Review of Systems   Constitutional: Negative for fever.   HENT: Negative for sore throat.    Eyes: Negative for visual disturbance.   Respiratory: Negative for shortness of breath.    Cardiovascular: Negative for chest pain.   Gastrointestinal: Positive for nausea. Negative for  constipation, diarrhea and vomiting.   Genitourinary: Positive for flank pain (right sided), frequency and urgency. Negative for dysuria.   Musculoskeletal: Positive for back pain.   Skin: Negative for rash.   Neurological: Negative for weakness and headaches.   Hematological: Does not bruise/bleed easily.       Physical Exam     Initial Vitals [07/11/22 2100]   BP Pulse Resp Temp SpO2   137/75 76 16 98.3 °F (36.8 °C) 98 %      MAP       --         Physical Exam    Vitals reviewed.  Constitutional: She appears well-developed and well-nourished. She is not diaphoretic. No distress.   HENT:   Head: Normocephalic and atraumatic.   Eyes: EOM are normal.   Neck: Neck supple.   Cardiovascular: Normal rate.   Pulmonary/Chest: No respiratory distress.   Abdominal: Abdomen is soft. There is no abdominal tenderness. There is no rebound and no guarding.   Musculoskeletal:         General: No tenderness or edema. Normal range of motion.      Cervical back: Neck supple.      Comments: Negative for CVA tenderness. Unable to reproduce the pain with palpation or percussion.      Neurological: She is alert.   Skin: Skin is warm.   Psychiatric: She has a normal mood and affect. Her behavior is normal. Thought content normal.         ED Course   Obtain Fetal nonstress test (NST)    Date/Time: 7/11/2022 9:47 PM  Performed by: Diamond Wall MD  Authorized by: Diamond Wall MD     Nonstress Test:     Variability:  6-25 BPM    Accelerations:  15 bpm    Baseline:  130  Biophysical Profile:     Nonstress Test Interpretation: reactive      Overall Impression:  Reassuring  Post-procedure:     Patient tolerance:  Patient tolerated the procedure well with no immediate complications      Labs Reviewed   URINALYSIS, REFLEX TO URINE CULTURE - Abnormal; Notable for the following components:       Result Value    Appearance, UA Cloudy (*)     Protein, UA 1+ (*)     Occult Blood UA 3+ (*)     Nitrite, UA Positive (*)     Leukocytes, UA 3+ (*)      All other components within normal limits    Narrative:     Specimen Source->Urine   URINALYSIS MICROSCOPIC - Abnormal; Notable for the following components:    RBC, UA 50 (*)     WBC, UA >100 (*)     Bacteria Many (*)     All other components within normal limits    Narrative:     Specimen Source->Urine   CULTURE, URINE          Imaging Results          US Retroperitoneal Complete (Final result)  Result time 07/11/22 23:34:32   Procedure changed from US Kidney     Final result by Eve Rivera MD (07/11/22 23:34:32)                 Impression:      Borderline elevated renal indices.  Findings may suggest medical renal disease.  Other differential considerations may include hypotension, perinephric fluid, ureteric obstruction, or other etiology.    Right moderate hydronephrosis. Nonvisualization of the right ureteral jet.  Question right ureteral stone.      Electronically signed by: Eve Rivera  Date:    07/11/2022  Time:    23:34             Narrative:    EXAMINATION:  ULTRASOUND RETROPERITONEAL COMPLETE    CLINICAL HISTORY:  Right-sided flank pain.    TECHNIQUE:  Real-time ultrasound of the retroperitoneum was performed, including the urinary bladder and kidneys.    COMPARISON:  None.    FINDINGS:  Right kidney measures 14.4 cm.  The resistive index is 0.7.  There is moderate hydronephrosis.    Left kidney measures 13.7 cm.  The resistive index is 0.7.    The bladder is within normal limits.  The left ureteral jet is visualized.  The right ureteral jet is not visualized.                                 Medications   oxyCODONE immediate release tablet 5 mg (5 mg Oral Given 7/11/22 2142)   lactated ringers bolus 1,000 mL (0 mLs Intravenous Stopped 7/11/22 2259)   cefTRIAXone (ROCEPHIN) 1 g/50 mL D5W IVPB (0 g Intravenous Stopped 7/12/22 0037)     Medical Decision Making:   ED Management:  NST reactive and reassuring  Start IV - will give 1L LR bolus  Oxy IR 5mg x 1 for pain  Right renal ultrasound  ordered   Urine dip with leuks and protein   Urinalysis sent; urine culture pending from Dr. Barrios's office   Will treat for UTI based empirically on symptoms of dysuria, frequency. Suspect possible kidney stone as well. No evidence of of pyelonephritis at this time.   Urinalysis positive for RBC, WBC, & Nitrites; Rocephin 1g given.  Renal ultrasound with evidence of possible right ureteral stone.  Patient discharged home with narcotic pain medication to use PRN. Discussed return precuations including inability to tolerate pain with oral pain medications alone. Patient verbalizes understanding.   Other:   I have discussed this case with another health care provider.       <> Summary of the Discussion: Dr. Vences            Attending Attestation:   Physician Attestation Statement for Resident:  As the supervising MD   Physician Attestation Statement: I have personally seen and examined this patient.   I agree with the above history. -:   As the supervising MD I agree with the above PE.    As the supervising MD I agree with the above treatment, course, plan, and disposition.   -: Patient evaluated and found to be stable, agree with resident's assessment and plan.  NST reactive and reassuring, no contractions on toco.  No CVAT and no h/o renal stones.  No renal stone noted on renal sonogram.  Positive h/o GBS + cystitis.  Hematuria and nitrite positive on UA.  IM Rocephin and urine culture.  Agree with discharge to home.  I was personally present during the critical portions of the procedure(s) performed by the resident and was immediately available in the ED to provide services and assistance as needed during the entire procedure.  I have reviewed the following: old records at this facility.                         Clinical Impression:   Final diagnoses:  [M54.9] Right-sided back pain, unspecified back location, unspecified chronicity  [Z3A.30] 30 weeks gestation of pregnancy  [N30.01] Acute cystitis with hematuria  (Primary)  [O26.833, N20.0] Kidney stone complicating pregnancy, third trimester          ED Disposition Condition    Discharge Stable        ED Prescriptions     Medication Sig Dispense Start Date End Date Auth. Provider    oxyCODONE-acetaminophen (PERCOCET) 5-325 mg per tablet Take 1 tablet by mouth every 4 (four) hours as needed for Pain. 15 tablet 7/12/2022  Diamond Wall MD        Follow-up Information    None          Diamond Wall M.D.  OB/GYN PGY-4     Diamond Wall MD  Resident  07/12/22 0000       Hortencia Vences MD  07/12/22 0359

## 2022-07-12 NOTE — DISCHARGE INSTRUCTIONS
Call clinic [phone] or L&D after hours [phone] for:   Vaginal bleeding,    Leakage of fluids,   Contractions (2-5 min apart for two hours),   Decreased fetal movement (less than 10 kicks in two hours),   Headache not relieved by Tylenol,   Blurry vision, or   Temp of 100.4 or greater    Begin doing fetal kick counts, at least 10 movements in two hours, starting at 28 weeks gestation    Keep next clinic appointment (see date and time below)

## 2022-07-13 ENCOUNTER — PATIENT MESSAGE (OUTPATIENT)
Dept: OBSTETRICS AND GYNECOLOGY | Facility: CLINIC | Age: 32
End: 2022-07-13
Payer: COMMERCIAL

## 2022-07-13 LAB — BACTERIA UR CULT: ABNORMAL

## 2022-07-14 LAB — BACTERIA UR CULT: ABNORMAL

## 2022-07-18 ENCOUNTER — TELEPHONE (OUTPATIENT)
Dept: OBSTETRICS AND GYNECOLOGY | Facility: CLINIC | Age: 32
End: 2022-07-18
Payer: COMMERCIAL

## 2022-07-18 ENCOUNTER — PATIENT MESSAGE (OUTPATIENT)
Dept: OBSTETRICS AND GYNECOLOGY | Facility: CLINIC | Age: 32
End: 2022-07-18
Payer: COMMERCIAL

## 2022-07-18 DIAGNOSIS — O26.893 PREGNANCY HEADACHE IN THIRD TRIMESTER: Primary | ICD-10-CM

## 2022-07-18 DIAGNOSIS — R51.9 PREGNANCY HEADACHE IN THIRD TRIMESTER: Primary | ICD-10-CM

## 2022-07-18 NOTE — TELEPHONE ENCOUNTER
Spoke to patient. States that the pain under the ribs have improved. Blood pressure reading was 134/83 through [facility] and decreased from last nights reading. Still having headache which she has not taken anything for it. Denies vision changes/ spots in vision. Advised to go to the OB ED at [facility] if blood pressure is 140/90 with symptoms or 160/100 even without symptoms. Also advised to to go OB ED if symptoms worsen.

## 2022-07-19 ENCOUNTER — HOSPITAL ENCOUNTER (INPATIENT)
Facility: OTHER | Age: 32
LOS: 2 days | Discharge: HOME | End: 2022-07-23
Attending: OBSTETRICS & GYNECOLOGY | Admitting: OBSTETRICS & GYNECOLOGY
Payer: COMMERCIAL

## 2022-07-19 ENCOUNTER — ANESTHESIA (OUTPATIENT)
Dept: OBSTETRICS AND GYNECOLOGY | Facility: OTHER | Age: 32
End: 2022-07-19

## 2022-07-19 ENCOUNTER — TELEPHONE (OUTPATIENT)
Dept: OBSTETRICS AND GYNECOLOGY | Facility: CLINIC | Age: 32
End: 2022-07-19
Payer: COMMERCIAL

## 2022-07-19 ENCOUNTER — PATIENT MESSAGE (OUTPATIENT)
Dept: OBSTETRICS AND GYNECOLOGY | Facility: CLINIC | Age: 32
End: 2022-07-19
Payer: COMMERCIAL

## 2022-07-19 ENCOUNTER — ANESTHESIA EVENT (OUTPATIENT)
Dept: OBSTETRICS AND GYNECOLOGY | Facility: OTHER | Age: 32
End: 2022-07-19

## 2022-07-19 DIAGNOSIS — R10.11 RUQ PAIN: Primary | ICD-10-CM

## 2022-07-19 DIAGNOSIS — Z3A.31 31 WEEKS GESTATION OF PREGNANCY: ICD-10-CM

## 2022-07-19 DIAGNOSIS — R74.01 TRANSAMINITIS: ICD-10-CM

## 2022-07-19 DIAGNOSIS — O14.93 PRE-ECLAMPSIA IN THIRD TRIMESTER: ICD-10-CM

## 2022-07-19 PROBLEM — D69.6 BENIGN GESTATIONAL THROMBOCYTOPENIA IN THIRD TRIMESTER: Status: ACTIVE | Noted: 2022-07-19

## 2022-07-19 PROBLEM — B95.1 GROUP B STREPTOCOCCUS URINARY TRACT INFECTION AFFECTING PREGNANCY IN THIRD TRIMESTER: Status: ACTIVE | Noted: 2022-07-19

## 2022-07-19 PROBLEM — R03.0 ELEVATED BLOOD PRESSURE READING WITHOUT DIAGNOSIS OF HYPERTENSION: Status: ACTIVE | Noted: 2022-07-19

## 2022-07-19 PROBLEM — O23.43 GROUP B STREPTOCOCCUS URINARY TRACT INFECTION AFFECTING PREGNANCY IN THIRD TRIMESTER: Status: ACTIVE | Noted: 2022-07-19

## 2022-07-19 PROBLEM — O13.3 GESTATIONAL HYPERTENSION, THIRD TRIMESTER: Status: ACTIVE | Noted: 2022-07-19

## 2022-07-19 PROBLEM — O99.113 BENIGN GESTATIONAL THROMBOCYTOPENIA IN THIRD TRIMESTER: Status: ACTIVE | Noted: 2022-07-19

## 2022-07-19 LAB
ALBUMIN SERPL BCP-MCNC: 3 G/DL (ref 3.5–5.2)
ALP SERPL-CCNC: 73 U/L (ref 55–135)
ALT SERPL W/O P-5'-P-CCNC: 74 U/L (ref 10–44)
AMMONIA PLAS-SCNC: 17 UMOL/L (ref 10–50)
ANION GAP SERPL CALC-SCNC: 12 MMOL/L (ref 8–16)
APTT BLDCRRT: 27.8 SEC (ref 21–32)
AST SERPL-CCNC: 59 U/L (ref 10–40)
BASOPHILS # BLD AUTO: 0.01 K/UL (ref 0–0.2)
BASOPHILS NFR BLD: 0.1 % (ref 0–1.9)
BILIRUB SERPL-MCNC: 0.6 MG/DL (ref 0.1–1)
BILIRUB UR QL STRIP: NEGATIVE
BUN SERPL-MCNC: 7 MG/DL (ref 6–20)
CALCIUM SERPL-MCNC: 9.9 MG/DL (ref 8.7–10.5)
CHLORIDE SERPL-SCNC: 104 MMOL/L (ref 95–110)
CLARITY UR: CLEAR
CO2 SERPL-SCNC: 22 MMOL/L (ref 23–29)
COLOR UR: YELLOW
CREAT SERPL-MCNC: 0.7 MG/DL (ref 0.5–1.4)
CREAT UR-MCNC: 19.6 MG/DL (ref 15–325)
DIFFERENTIAL METHOD: ABNORMAL
EOSINOPHIL # BLD AUTO: 0.1 K/UL (ref 0–0.5)
EOSINOPHIL NFR BLD: 0.5 % (ref 0–8)
ERYTHROCYTE [DISTWIDTH] IN BLOOD BY AUTOMATED COUNT: 13.2 % (ref 11.5–14.5)
EST. GFR  (AFRICAN AMERICAN): >60 ML/MIN/1.73 M^2
EST. GFR  (NON AFRICAN AMERICAN): >60 ML/MIN/1.73 M^2
FIBRINOGEN PPP-MCNC: 430 MG/DL (ref 182–400)
GLUCOSE SERPL-MCNC: 68 MG/DL (ref 70–110)
GLUCOSE UR QL STRIP: NEGATIVE
HCT VFR BLD AUTO: 35 % (ref 37–48.5)
HGB BLD-MCNC: 12.3 G/DL (ref 12–16)
HGB UR QL STRIP: NEGATIVE
HIV 1+2 AB+HIV1 P24 AG SERPL QL IA: NEGATIVE
IMM GRANULOCYTES # BLD AUTO: 0.04 K/UL (ref 0–0.04)
IMM GRANULOCYTES NFR BLD AUTO: 0.4 % (ref 0–0.5)
INR PPP: 0.9 (ref 0.8–1.2)
KETONES UR QL STRIP: NEGATIVE
LEUKOCYTE ESTERASE UR QL STRIP: NEGATIVE
LYMPHOCYTES # BLD AUTO: 2.3 K/UL (ref 1–4.8)
LYMPHOCYTES NFR BLD: 21.2 % (ref 18–48)
MCH RBC QN AUTO: 31.4 PG (ref 27–31)
MCHC RBC AUTO-ENTMCNC: 35.1 G/DL (ref 32–36)
MCV RBC AUTO: 89 FL (ref 82–98)
MONOCYTES # BLD AUTO: 0.8 K/UL (ref 0.3–1)
MONOCYTES NFR BLD: 7.6 % (ref 4–15)
NEUTROPHILS # BLD AUTO: 7.7 K/UL (ref 1.8–7.7)
NEUTROPHILS NFR BLD: 70.2 % (ref 38–73)
NITRITE UR QL STRIP: NEGATIVE
NRBC BLD-RTO: 0 /100 WBC
PH UR STRIP: 7 [PH] (ref 5–8)
PLATELET # BLD AUTO: 122 K/UL (ref 150–450)
PMV BLD AUTO: 10.9 FL (ref 9.2–12.9)
POTASSIUM SERPL-SCNC: 3.9 MMOL/L (ref 3.5–5.1)
PROT SERPL-MCNC: 6.8 G/DL (ref 6–8.4)
PROT UR QL STRIP: NEGATIVE
PROT UR-MCNC: <7 MG/DL (ref 0–15)
PROT/CREAT UR: NORMAL MG/G{CREAT} (ref 0–0.2)
PROTHROMBIN TIME: 9.6 SEC (ref 9–12.5)
RBC # BLD AUTO: 3.92 M/UL (ref 4–5.4)
SODIUM SERPL-SCNC: 138 MMOL/L (ref 136–145)
SP GR UR STRIP: <=1.005 (ref 1–1.03)
URATE SERPL-MCNC: 5.3 MG/DL (ref 2.4–5.7)
URN SPEC COLLECT METH UR: ABNORMAL
UROBILINOGEN UR STRIP-ACNC: NEGATIVE EU/DL
WBC # BLD AUTO: 10.92 K/UL (ref 3.9–12.7)

## 2022-07-19 PROCEDURE — 59025 PR FETAL 2N-STRESS TEST: ICD-10-PCS | Mod: 26,,, | Performed by: OBSTETRICS & GYNECOLOGY

## 2022-07-19 PROCEDURE — 99219 PR INITIAL OBSERVATION CARE,LEVL II: CPT | Mod: 25,,, | Performed by: OBSTETRICS & GYNECOLOGY

## 2022-07-19 PROCEDURE — 82140 ASSAY OF AMMONIA: CPT | Performed by: GENERAL PRACTICE

## 2022-07-19 PROCEDURE — 85610 PROTHROMBIN TIME: CPT | Performed by: GENERAL PRACTICE

## 2022-07-19 PROCEDURE — 25000003 PHARM REV CODE 250

## 2022-07-19 PROCEDURE — G0378 HOSPITAL OBSERVATION PER HR: HCPCS

## 2022-07-19 PROCEDURE — 80053 COMPREHEN METABOLIC PANEL: CPT | Performed by: GENERAL PRACTICE

## 2022-07-19 PROCEDURE — 63600175 PHARM REV CODE 636 W HCPCS

## 2022-07-19 PROCEDURE — 85730 THROMBOPLASTIN TIME PARTIAL: CPT | Performed by: GENERAL PRACTICE

## 2022-07-19 PROCEDURE — 84550 ASSAY OF BLOOD/URIC ACID: CPT

## 2022-07-19 PROCEDURE — 99219 PR INITIAL OBSERVATION CARE,LEVL II: ICD-10-PCS | Mod: 25,,, | Performed by: OBSTETRICS & GYNECOLOGY

## 2022-07-19 PROCEDURE — 99285 PR EMERGENCY DEPT VISIT,LEVEL V: ICD-10-PCS | Mod: 25,,, | Performed by: OBSTETRICS & GYNECOLOGY

## 2022-07-19 PROCEDURE — 59025 FETAL NON-STRESS TEST: CPT | Mod: 26,,, | Performed by: OBSTETRICS & GYNECOLOGY

## 2022-07-19 PROCEDURE — 85384 FIBRINOGEN ACTIVITY: CPT | Performed by: GENERAL PRACTICE

## 2022-07-19 PROCEDURE — 59025 FETAL NON-STRESS TEST: CPT

## 2022-07-19 PROCEDURE — 81003 URINALYSIS AUTO W/O SCOPE: CPT | Performed by: GENERAL PRACTICE

## 2022-07-19 PROCEDURE — 99285 EMERGENCY DEPT VISIT HI MDM: CPT | Mod: 25,,, | Performed by: OBSTETRICS & GYNECOLOGY

## 2022-07-19 PROCEDURE — 86592 SYPHILIS TEST NON-TREP QUAL: CPT | Performed by: OBSTETRICS & GYNECOLOGY

## 2022-07-19 PROCEDURE — 80074 ACUTE HEPATITIS PANEL: CPT

## 2022-07-19 PROCEDURE — 84156 ASSAY OF PROTEIN URINE: CPT | Performed by: GENERAL PRACTICE

## 2022-07-19 PROCEDURE — 85025 COMPLETE CBC W/AUTO DIFF WBC: CPT | Performed by: GENERAL PRACTICE

## 2022-07-19 PROCEDURE — 87389 HIV-1 AG W/HIV-1&-2 AB AG IA: CPT | Performed by: OBSTETRICS & GYNECOLOGY

## 2022-07-19 PROCEDURE — 99285 EMERGENCY DEPT VISIT HI MDM: CPT | Mod: 25

## 2022-07-19 PROCEDURE — 96372 THER/PROPH/DIAG INJ SC/IM: CPT

## 2022-07-19 RX ORDER — SODIUM CHLORIDE 0.9 % (FLUSH) 0.9 %
10 SYRINGE (ML) INJECTION
Status: DISCONTINUED | OUTPATIENT
Start: 2022-07-19 | End: 2022-07-21 | Stop reason: ALTCHOICE

## 2022-07-19 RX ORDER — ONDANSETRON 8 MG/1
8 TABLET, ORALLY DISINTEGRATING ORAL EVERY 8 HOURS PRN
Status: DISCONTINUED | OUTPATIENT
Start: 2022-07-19 | End: 2022-07-21 | Stop reason: ALTCHOICE

## 2022-07-19 RX ORDER — PROCHLORPERAZINE EDISYLATE 5 MG/ML
5 INJECTION INTRAMUSCULAR; INTRAVENOUS EVERY 6 HOURS PRN
Status: DISCONTINUED | OUTPATIENT
Start: 2022-07-19 | End: 2022-07-21 | Stop reason: ALTCHOICE

## 2022-07-19 RX ORDER — MAG HYDROX/ALUMINUM HYD/SIMETH 200-200-20
30 SUSPENSION, ORAL (FINAL DOSE FORM) ORAL ONCE
Status: COMPLETED | OUTPATIENT
Start: 2022-07-19 | End: 2022-07-19

## 2022-07-19 RX ORDER — LIDOCAINE HYDROCHLORIDE 20 MG/ML
10 SOLUTION OROPHARYNGEAL ONCE
Status: COMPLETED | OUTPATIENT
Start: 2022-07-19 | End: 2022-07-19

## 2022-07-19 RX ORDER — OXYCODONE HYDROCHLORIDE 5 MG/1
5 TABLET ORAL EVERY 6 HOURS PRN
Status: DISCONTINUED | OUTPATIENT
Start: 2022-07-19 | End: 2022-07-21 | Stop reason: ALTCHOICE

## 2022-07-19 RX ORDER — SIMETHICONE 80 MG
1 TABLET,CHEWABLE ORAL EVERY 6 HOURS PRN
Status: DISCONTINUED | OUTPATIENT
Start: 2022-07-19 | End: 2022-07-21 | Stop reason: ALTCHOICE

## 2022-07-19 RX ORDER — DIPHENHYDRAMINE HCL 25 MG
25 CAPSULE ORAL EVERY 4 HOURS PRN
Status: DISCONTINUED | OUTPATIENT
Start: 2022-07-19 | End: 2022-07-21 | Stop reason: ALTCHOICE

## 2022-07-19 RX ORDER — BETAMETHASONE SODIUM PHOSPHATE AND BETAMETHASONE ACETATE 3; 3 MG/ML; MG/ML
12 INJECTION, SUSPENSION INTRA-ARTICULAR; INTRALESIONAL; INTRAMUSCULAR; SOFT TISSUE
Status: COMPLETED | OUTPATIENT
Start: 2022-07-19 | End: 2022-07-20

## 2022-07-19 RX ORDER — AMOXICILLIN 250 MG
1 CAPSULE ORAL NIGHTLY PRN
Status: DISCONTINUED | OUTPATIENT
Start: 2022-07-19 | End: 2022-07-21 | Stop reason: ALTCHOICE

## 2022-07-19 RX ORDER — DIPHENHYDRAMINE HYDROCHLORIDE 50 MG/ML
25 INJECTION INTRAMUSCULAR; INTRAVENOUS EVERY 4 HOURS PRN
Status: DISCONTINUED | OUTPATIENT
Start: 2022-07-19 | End: 2022-07-21 | Stop reason: ALTCHOICE

## 2022-07-19 RX ORDER — METOCLOPRAMIDE HYDROCHLORIDE 5 MG/ML
10 INJECTION INTRAMUSCULAR; INTRAVENOUS ONCE
Status: COMPLETED | OUTPATIENT
Start: 2022-07-19 | End: 2022-07-19

## 2022-07-19 RX ORDER — PRENATAL WITH FERROUS FUM AND FOLIC ACID 3080; 920; 120; 400; 22; 1.84; 3; 20; 10; 1; 12; 200; 27; 25; 2 [IU]/1; [IU]/1; MG/1; [IU]/1; MG/1; MG/1; MG/1; MG/1; MG/1; MG/1; UG/1; MG/1; MG/1; MG/1; MG/1
1 TABLET ORAL DAILY
Status: DISCONTINUED | OUTPATIENT
Start: 2022-07-20 | End: 2022-07-21 | Stop reason: ALTCHOICE

## 2022-07-19 RX ORDER — FAMOTIDINE 20 MG/1
40 TABLET, FILM COATED ORAL 2 TIMES DAILY
Status: DISCONTINUED | OUTPATIENT
Start: 2022-07-19 | End: 2022-07-21 | Stop reason: ALTCHOICE

## 2022-07-19 RX ORDER — DIPHENHYDRAMINE HYDROCHLORIDE 50 MG/ML
25 INJECTION INTRAMUSCULAR; INTRAVENOUS EVERY 6 HOURS PRN
Status: DISCONTINUED | OUTPATIENT
Start: 2022-07-19 | End: 2022-07-19

## 2022-07-19 RX ORDER — PRENATAL WITH FERROUS FUM AND FOLIC ACID 3080; 920; 120; 400; 22; 1.84; 3; 20; 10; 1; 12; 200; 27; 25; 2 [IU]/1; [IU]/1; MG/1; [IU]/1; MG/1; MG/1; MG/1; MG/1; MG/1; MG/1; UG/1; MG/1; MG/1; MG/1; MG/1
1 TABLET ORAL DAILY
Status: DISCONTINUED | OUTPATIENT
Start: 2022-07-20 | End: 2022-07-19

## 2022-07-19 RX ORDER — FAMOTIDINE 20 MG/1
40 TABLET, FILM COATED ORAL 2 TIMES DAILY
Status: DISCONTINUED | OUTPATIENT
Start: 2022-07-19 | End: 2022-07-19

## 2022-07-19 RX ADMIN — BETAMETHASONE SODIUM PHOSPHATE AND BETAMETHASONE ACETATE 12 MG: 3; 3 INJECTION, SUSPENSION INTRA-ARTICULAR; INTRALESIONAL; INTRAMUSCULAR at 04:07

## 2022-07-19 RX ADMIN — ALUMINUM HYDROXIDE, MAGNESIUM HYDROXIDE, AND SIMETHICONE 30 ML: 200; 200; 20 SUSPENSION ORAL at 04:07

## 2022-07-19 RX ADMIN — FAMOTIDINE 40 MG: 20 TABLET ORAL at 06:07

## 2022-07-19 RX ADMIN — DIPHENHYDRAMINE HYDROCHLORIDE 25 MG: 25 CAPSULE ORAL at 06:07

## 2022-07-19 RX ADMIN — METOCLOPRAMIDE 10 MG: 5 INJECTION, SOLUTION INTRAMUSCULAR; INTRAVENOUS at 06:07

## 2022-07-19 RX ADMIN — LIDOCAINE HYDROCHLORIDE 10 ML: 20 SOLUTION ORAL at 04:07

## 2022-07-19 NOTE — HPI
Chelsie Green is a 32 y.o. [id number] at 31w2d who presented to the OB ED complaining of RUQ pain and at home elevated BP (140-150s/90s). Describes pain as stabbing and radiates from epigastric area to RUQ. Present for 2-3 days. Pain worsens with palpation and is not relieved with OTC medications. Reports temporary relief with percocet PRN. Reports intermittent episodes of nausea and vomiting. Additionally, patient reports up trending elevated blood pressures at home with one severe blood pressure at outside facility.     Patient reports headache without vision changes and increased swelling in hands/ feet. Denies worsening shortness of breath, chest pain, or palpitations.     While in OB ED, patient's blood pressures consistently mild range (not yet meeting criteria for gHTN), liver enzymes (59/74) noted to be up trending, and GTP (platelets 122). Ultrasound completed in OB ED shows no evidence of acute cholecystitis and possible changes consistent with steatosis/ other infiltrative process and possible hemangioma. Decision made to admit patient to antepartum service for further evaluation in the setting of transminitis with mildly elevated blood pressures and gestational thrombocytopenia with platelets trending downward.      This IUP is complicated by transamnitis, GTP, elevated BP (not yet meeting criteria for gHTN), h/o covid (), h/o gHTN, GBS UTI, h/o cystitis/nephrolithiasis, and CIN3 (diagnosed by colpo at 28w).  Patient reports contractions, denies vaginal bleeding, denies LOF.   Fetal Movement: normal.

## 2022-07-19 NOTE — H&P
[facility] - Antepartum ([place])  Obstetrics  History & Physical    Patient Name: Chelsie Green  MRN: [id number]  Admission Date: 2022  Primary Care Provider: Kalee Olvera MD    Subjective:     Principal Problem:<principal problem not specified>    History of Present Illness:  Chelsie Green is a 32 y.o. [id number] at 31w2d who presented to the OB ED complaining of RUQ pain and at home elevated BP (140-150s/90s). Describes pain as stabbing and radiates from epigastric area to RUQ. Present for 2-3 days. Pain worsens with palpation and is not relieved with OTC medications. Reports temporary relief with percocet PRN. Reports intermittent episodes of nausea and vomiting. Additionally, patient reports up trending elevated blood pressures at home with one severe blood pressure at outside facility.     Patient reports headache without vision changes and increased swelling in hands/ feet. Denies worsening shortness of breath, chest pain, or palpitations.     While in OB ED, patient's blood pressures consistently mild range (not yet meeting criteria for gHTN), liver enzymes (59/74) noted to be up trending, and GTP (platelets 122). Ultrasound completed in OB ED shows no evidence of acute cholecystitis and possible changes consistent with steatosis/ other infiltrative process and possible hemangioma. Decision made to admit patient to antepartum service for further evaluation in the setting of transminitis with mildly elevated blood pressures and gestational thrombocytopenia with platelets trending downward.      This IUP is complicated by transamnitis, GTP, elevated BP (not yet meeting criteria for gHTN), h/o covid (), h/o gHTN, GBS UTI, h/o cystitis/nephrolithiasis, and CIN3 (diagnosed by colpo at 28w).  Patient reports contractions, denies vaginal bleeding, denies LOF.   Fetal Movement: normal.          Obstetric HPI:  Patient reports Yes contractions, active fetal movement, No vaginal bleeding , No loss of fluid      This IUP is complicated by transamnitis, GTP, elevated BP (not yet meeting criteria for gHTN), h/o covid (), h/o gHTN, GBS UTI, h/o cystitis/nephrolithiasis, and CIN3 (diagnosed by colpo at 28w).   OB History    Para Term  AB Living   2 1 1 0 0 1   SAB IAB Ectopic Multiple Live Births   0 0 0 0 1      # Outcome Date GA Lbr Isidoro/2nd Weight Sex Delivery Anes PTL Lv   2 Current            1 Term 19 37w6d / 01:00 3.204 kg (7 lb 1 oz) M Vag-Spont EPI N JOSE      Name: VIANEY JURADO      Apgar1: 9  Apgar5: 9     Past Medical History:   Diagnosis Date    Abnormal Pap smear of cervix     Heart murmur     evaluated as a child, told not to be a problem    Scoliosis     didn' t need brace     Past Surgical History:   Procedure Laterality Date    breast augmentation      BREAST SURGERY      COSMETIC SURGERY      NOSE SURGERY      correct fracture of nose    TONSILLECTOMY, ADENOIDECTOMY         PTA Medications   Medication Sig    ondansetron (ZOFRAN-ODT) 4 MG TbDL Dissolve 1 tablet (4 mg total) by mouth every 6 (six) hours as needed (nausea).    oxyCODONE-acetaminophen (PERCOCET) 5-325 mg per tablet Take 1 tablet by mouth every 4 (four) hours as needed for Pain.    prenatal 105-iron-folic ac-dha 30 mg iron- 1.4 mg-300 mg Cmpk Take by mouth.    prochlorperazine (COMPAZINE) 5 MG tablet Take 1 tablet (5 mg total) by mouth every 6 (six) hours as needed for Nausea.    promethazine (PHENERGAN) 25 MG tablet Take 1 tablet (25 mg total) by mouth every 4 (four) hours.       Review of patient's allergies indicates:  No Known Allergies     Family History       Problem Relation (Age of Onset)    Hypertension Father    Stroke Mother          Tobacco Use    Smoking status: Never Smoker    Smokeless tobacco: Never Used   Substance and Sexual Activity    Alcohol use: Not Currently     Alcohol/week: 2.0 standard drinks     Types: 2 Glasses of wine per week     Comment: once a week    Drug use:  No    Sexual activity: Yes     Partners: Male     Birth control/protection: OCP     Review of Systems   Constitutional:  Negative for appetite change, diaphoresis, fever and unexpected weight change.   Eyes:  Negative for visual disturbance.   Respiratory:  Negative for cough.    Cardiovascular:  Negative for chest pain and leg swelling.   Gastrointestinal:  Positive for abdominal pain and nausea. Negative for constipation, diarrhea and vomiting.        RUQ pain    Genitourinary:  Negative for vaginal bleeding.   Musculoskeletal:  Negative for arthralgias and back pain.   Integumentary:  Negative for nipple discharge.   Neurological:  Negative for headaches.   Psychiatric/Behavioral:  The patient is not nervous/anxious.    Breast: Negative for nipple discharge   Objective:     Vital Signs (Most Recent):  Temp: 98.1 °F (36.7 °C) (07/19/22 1700)  Pulse: 78 (07/19/22 1700)  Resp: 18 (07/19/22 1700)  BP: (!) 148/81 (07/19/22 1700)  SpO2: 99 % (07/19/22 1700)   Vital Signs (24h Range):  Temp:  [98.1 °F (36.7 °C)] 98.1 °F (36.7 °C)  Pulse:  [73-98] 78  Resp:  [18] 18  SpO2:  [99 %-100 %] 99 %  BP: (124-154)/(71-81) 148/81        There is no height or weight on file to calculate BMI.    FHT: 145 bpm, mod variability, + accels, - decels (reassuring)  TOCO:  Q 3-5 minutes    Physical Exam:   Constitutional: She is oriented to person, place, and time. She appears well-developed and well-nourished.    HENT:   Head: Normocephalic.    Eyes: Pupils are equal, round, and reactive to light.     Cardiovascular:  Normal rate.             Pulmonary/Chest: Effort normal. No respiratory distress.        Abdominal: Soft. Bowel sounds are normal. There is abdominal tenderness in the right upper quadrant and epigastric area. There is no rebound, no guarding, no tenderness at McBurney's point, no right CVA tenderness and no left CVA tenderness.     Genitourinary:    Vagina normal.   No  no vaginal discharge in the vagina.            Musculoskeletal: Normal range of motion.       Neurological: She is alert and oriented to person, place, and time.    Skin: Skin is warm and dry.    Psychiatric: She has a normal mood and affect. Her behavior is normal. Judgment and thought content normal.     Cervix:  Dilation:  0  Effacement:  50%  Station: -3  Presentation: Vertex     Significant Labs:  Lab Results   Component Value Date    GROUPTRH A POS 2022    HEPBSAG Negative 2022       Recent Labs   Lab 22  1508 22  1359   WBC 9.64 10.92   HGB 13.0 12.3   HCT 37.2 35.0*   MCV 89 89   * 122*      Recent Labs   Lab 22  1152 22  1359    138   K 3.7 3.9    104   CO2 26 22*   BUN 9 7   CREATININE 0.7 0.7   * 68*   PROT 6.8 6.8   BILITOT 0.5 0.6   ALKPHOS 75 73   ALT 63* 74*   AST 49* 59*       UA: negative     PCR: TLTC     Ammonia: 17 (wnl)     PTT wnl, PT wnl, INR wnl, Fibrinogen wnl     Uric Acid: 5.3     Assessment/Plan:     32 y.o. female  at 31w2d for:    31 weeks gestation of pregnancy  - Fetal Monitoring: NST BID   - Diet: Regular   - IVF: none indicated while adequate PO intake   - BSUS: vertex, EFW 1476g @ 31w2d   - CTX q 3-5 minutes; SVE 0.5/50/-3, unchanged after 2 hrs   - Vaginal delivery consents and blood consents signed bedside   - Low postpartum hemorrhage risk; will increase to medium if platelets drop below 100,000   - BM -   - 3T labs collected    - Passed 1-hr glucose screen   - Tdap received this pregnancy   - Will continue PNV    Benign gestational thrombocytopenia in third trimester  - Platelets downward trendin > 122 ()   - Uric Acid wnl   - Coags wnl (PT-INR, PTT, Fibrinogen)   - Will repeat CBC tomorrow AM     Group B Streptococcus urinary tract infection affecting pregnancy in third trimester  - GBS not repeated on admission due to GBS UTI   - Will administer PCN intrapartum     Elevated blood pressure  - At this time patient does not meet  criteria for gHTN as mild elevated blood pressures have not been greater than 4 hours apart. Will continue to closely monitor.   - BP: (124-154)/(71-81) 148/81  - Reports headache (give benadryl/reglan x1) with no vision changes, RUQ pain (give oxy IR 5mg PRN), and worsening hand/feet edema   -  Plts trending downward fashion 144 > 122   - Cr 0.7, AST/ALT 59/74   - PCR TLTC; PCR (07/11) 0.34 in the setting of acute cystitis, repeat PCR labs have been non-diagnostic for pre-eclampsia without severe features   - Continue to cycle blood pressures and if continued elevated blood pressure greater than 4 hours will meet criteria for gHTN  - Will repeat CBC/CMP/PCR in AM     History of Gestational hypertension  - Has not been diagnosed with gHTN in this pregnancy.   - See elevated BP.     Transaminitis  - RUQ pain not relieved with GI cocktail in OB ED   - Start pepcid BID and oxy IR q 6 hrs PRN   - Patient with recent history of covid with liver enzymes wnl s/p covid diagnosis.   - RUQ Ultrasound: No acute cholecystitis. Hepatic parenchymal changes- possible steatosis or other infiltrative process. Possible hemangioma.   - Hepatitis Panel: pending; Hep B/C in 1st trimester negative   - Liver enzymes trending upward: 49/63 > 59/74  - Ammonia: wnl   - Will repeat CMP in AM     Yvonne Conner MD  Obstetrics  [facility] - Antepartum ([place])

## 2022-07-19 NOTE — ED PROVIDER NOTES
Encounter Date: 2022       History     Chief Complaint   Patient presents with    Hypertension     Chelsie Green is a 32 y.o. [id number] at 31w2d presents complaining of RUQ pain. Patient states she gets a pain in her RUQ that is tender to touch. She can pinpoint the pain. She is also experiencing some SOB that is exertional and with the pain. She had COVID 3 weeks ago and is still having cough and SOB from that as well. Patient also endorses some pain in the middle of her chest that she can sometimes feel in her back. She takes tums for indigestion.     Of note, patient was diagnosed with kidney stones and acute cystitis in the [facility] 1 week ago.  She was noted to have elevated BP at that time. She has also had elevated blood pressures on connected MOM.     This IUP has been complicated by OSVALDO III, hx of gHTN in previous pregnancy.  Patient denies contractions, denies vaginal bleeding, denies LOF.   Fetal Movement: normal.        Review of patient's allergies indicates:  No Known Allergies  Past Medical History:   Diagnosis Date    Abnormal Pap smear of cervix     Heart murmur     evaluated as a child, told not to be a problem    Scoliosis     didn' t need brace     Past Surgical History:   Procedure Laterality Date    breast augmentation      BREAST SURGERY      COSMETIC SURGERY      NOSE SURGERY      correct fracture of nose    TONSILLECTOMY, ADENOIDECTOMY       Family History   Problem Relation Age of Onset    Stroke Mother     Hypertension Father     Breast cancer Neg Hx     Colon cancer Neg Hx     Ovarian cancer Neg Hx     Cancer Neg Hx      Social History     Tobacco Use    Smoking status: Never Smoker    Smokeless tobacco: Never Used   Substance Use Topics    Alcohol use: Not Currently     Alcohol/week: 2.0 standard drinks     Types: 2 Glasses of wine per week     Comment: once a week    Drug use: No     Review of Systems   Constitutional: Negative for chills and fever.   HENT:  Negative for congestion and sore throat.    Eyes: Negative for visual disturbance.   Respiratory: Positive for cough and shortness of breath.    Cardiovascular: Positive for chest pain.   Gastrointestinal: Positive for abdominal pain (RUQ). Negative for constipation, diarrhea, nausea and vomiting.   Genitourinary: Negative for dysuria, frequency, vaginal bleeding and vaginal discharge.   Musculoskeletal: Negative for back pain.   Skin: Negative for rash.   Neurological: Negative for weakness and headaches.   Hematological: Does not bruise/bleed easily.   Psychiatric/Behavioral: Negative for dysphoric mood. The patient is not nervous/anxious.        Physical Exam     Initial Vitals   BP Pulse Resp Temp SpO2   07/19/22 1336 07/19/22 1336 07/19/22 1700 07/19/22 1700 07/19/22 1338   (!) 142/81 77 18 98.1 °F (36.7 °C) 100 %      MAP       --                Physical Exam    Vitals reviewed.  Constitutional: She appears well-developed and well-nourished. She is not diaphoretic. No distress.   HENT:   Head: Normocephalic and atraumatic.   Eyes: EOM are normal.   Neck: Neck supple.   Normal range of motion.  Cardiovascular: Normal rate.   Pulmonary/Chest: Breath sounds normal. No respiratory distress.   Abdominal: There is no abdominal tenderness.   Gravid There is no guarding.   Musculoskeletal:         General: Normal range of motion.      Cervical back: Normal range of motion and neck supple.     Neurological: She is alert and oriented to person, place, and time.   Skin: Skin is warm and dry.   Psychiatric: She has a normal mood and affect. Her behavior is normal. Thought content normal.     OB LABOR EXAM:   Pre-Term Labor: No.   Membranes ruptured: No.   Method: Sterile vaginal exam per MD.       Dilatation: 0.   Station: -3.               ED Course   Obtain Fetal nonstress test (NST)    Date/Time: 7/19/2022 2:34 PM  Performed by: Mehrdad Fernández MD  Authorized by: Mehrdad Fernández MD     Nonstress Test:     Variability:   6-25 BPM    Decelerations:  None    Accelerations:  15 bpm    Baseline:  140    Contractions:  Irregular    Contraction Frequency:  3-5  Biophysical Profile:     Nonstress Test Interpretation: reactive      Overall Impression:  Reassuring  Post-procedure:     Patient tolerance:  Patient tolerated the procedure well with no immediate complications      Labs Reviewed   CBC W/ AUTO DIFFERENTIAL - Abnormal; Notable for the following components:       Result Value    RBC 3.92 (*)     Hematocrit 35.0 (*)     MCH 31.4 (*)     Platelets 122 (*)     All other components within normal limits   COMPREHENSIVE METABOLIC PANEL - Abnormal; Notable for the following components:    CO2 22 (*)     Glucose 68 (*)     Albumin 3.0 (*)     AST 59 (*)     ALT 74 (*)     All other components within normal limits   URINALYSIS, REFLEX TO URINE CULTURE - Abnormal; Notable for the following components:    Specific Gravity, UA <=1.005 (*)     All other components within normal limits    Narrative:     Specimen Source->Urine   PROTEIN / CREATININE RATIO, URINE    Narrative:     Specimen Source->Urine   URINALYSIS, REFLEX TO URINE CULTURE   HIV 1 / 2 ANTIBODY    Narrative:     Release to patient->Immediate   AMMONIA          Imaging Results          US Abdomen Limited (Final result)  Result time 07/19/22 14:54:32    Final result by Jp Alvarado MD (07/19/22 14:54:32)                 Impression:      No findings to suggest acute cholecystitis.    Stable moderate to severe right hydronephrosis, better evaluated 07/11/2022.    Hepatic parenchymal changes could reflect steatosis or other infiltrative process, correlation with LFTs advised.    Echogenic foci within the right hepatic lobe, too small for characterization, possibly hemangioma.      Electronically signed by: Jp Alvarado MD  Date:    07/19/2022  Time:    14:54             Narrative:    EXAMINATION:  US ABDOMEN LIMITED    CLINICAL HISTORY:  RUQ pain;    TECHNIQUE:  Limited  ultrasound of the right upper quadrant of the abdomen (including pancreas, liver, gallbladder, common bile duct, and spleen) was performed.    COMPARISON:  Retroperitoneal ultrasound 07/11/2022    FINDINGS:  The visualized portions of the pancreas are unremarkable.  The gallbladder is nondistended.  No gallbladder wall thickening or gallbladder wall hyperemia.  No pericholecystic fluid.  Sonographic Montesinos sign is negative.  The common duct is not dilated measuring 0.2 cm.  The IVC is unremarkable.  The hepatic parenchyma is somewhat heterogeneous and echogenic, the liver is prominent measuring 18 cm.  There is an echogenic lesion within the anterior aspect of the right hepatic lobe measuring 0.8 cm, nonspecific, possibly hemangioma.  A similar appearing lesion is noted within the right hepatic lobe inferiorly measuring up to 0.8 cm.  There is right hydronephrosis, stable.  The spleen is grossly unremarkable.  No ascites.                                   Medical Decision Making:   ED Management:  Patient is afebrile, VSS.    1. RUQ pain  - CBC results showed decreased platelet count 122.   - CMP WNL with the exception of an elevated AST/ALT  59/74. See results above.  - RUQ ultrasound showed concern for hepatic hemangioma and hepatic parenchymal changes. See results above.  - PT/PTT/Fibrinogen ordered    2. SOB  - O2 saturations stable  - Normal lung exam  - SOB may be due to post-covid changes    3. Contractions  - SVE: c/t/h    Consulted [facility] for further work up. Patient will be admitted to [facility] service for observation due to transaminitis in the setting of RUQ pain.            Attending Attestation:   Physician Attestation Statement for Resident:  As the supervising MD   Physician Attestation Statement: I have personally seen and examined this patient.   I agree with the above history. -:   As the supervising MD I agree with the above PE.    As the supervising MD I agree with the above treatment, course, plan, and  disposition.   -: Patient evaluated and found to be stable, agree with resident's assessment and plan.  NST reactive and reassuring, toco with irregular contractions.  Exam not c/w labor.  Elevated LFTs with decreased platelets.  RUQ sonogram shows infiltrative process of the liver.  D/W MFM, will admit to antepartum.      I was personally present during the critical portions of the procedure(s) performed by the resident and was immediately available in the ED to provide services and assistance as needed during the entire procedure.  I have reviewed and agree with the residents interpretation of the following: lab data.  I have reviewed the following: old records at this facility.                ED Course as of 07/21/22 1654   Tue Jul 19, 2022   1513 US Abdomen Limited [CD]      ED Course User Index  [CD] Hortencia Vences MD             Clinical Impression:   Final diagnoses:  [R74.01] Transaminitis  [Z3A.31] 31 weeks gestation of pregnancy  [R10.11] RUQ pain (Primary)          ED Disposition Condition    Observation               Mehrdad Fernández MD  Resident  07/19/22 1824       Hortencia Vences MD  07/21/22 1654

## 2022-07-19 NOTE — HOSPITAL COURSE
07/19/2022: Admit to antepartum service for further evaluation of RUQ pain, mildly elevated BP, and GTP. BMZ 07/19-07/20. Regular diet. NST BID. Patient reports irregular contractions. SVE 0.5/50/-3.  07/20/2022: NAEON. Pt reports RUQ pain resolved. Normotensive blood pressures overnight. Patient meets criteria for pre-eclampsia with severe features. Endorses irregular non-painful contractions. No other obstetric complaints.

## 2022-07-19 NOTE — ASSESSMENT & PLAN NOTE
- At this time patient does not meet criteria for gHTN as mild elevated blood pressures have not been greater than 4 hours apart. Will continue to closely monitor.   - BP: (124-154)/(71-81) 148/81  - Reports headache (give benadryl/reglan x1) with no vision changes, RUQ pain (give oxy IR 5mg PRN), and worsening hand/feet edema   -  Plts trending downward fashion 144 > 122   - Cr 0.7, AST/ALT 59/74   - PCR TLTC; PCR (07/11) 0.34 in the setting of acute cystitis, repeat PCR labs have been non-diagnostic for pre-eclampsia without severe features   - Continue to cycle blood pressures and if continued elevated blood pressure greater than 4 hours will meet criteria for gHTN  - Will repeat CBC/CMP/PCR in AM

## 2022-07-19 NOTE — TELEPHONE ENCOUNTER
Spoke with patient about results.  Pt continues to have RUQ pain.  BPs are mild range on connected mom.  Over the weekend, went to the ED but, given a long wait time, ended up leaving before she was evaluated.  BP in the ED was 153/76.  Pt not having any HAs or SOB.  However, pain is persistent.  Labs show elevated LFTs.  Plts slightly low but patient has hx of GTP with last pregnancy.  Spoke with MFM (Dr. Canales) about patient.  Given RUQ pain and BP, will have come to [facility] for evaluation.  Will need hepatitis panel and RUQ ultrasound.  Discussed with patient.  Pt leaving work to come in for evaluation.     Shannon Barrios MD  Obstetrics & Gynecology

## 2022-07-19 NOTE — SUBJECTIVE & OBJECTIVE
Obstetric HPI:  Patient reports Yes contractions, active fetal movement, No vaginal bleeding , No loss of fluid     This IUP is complicated by transamnitis, GTP, elevated BP (not yet meeting criteria for gHTN), h/o covid (), h/o gHTN, GBS UTI, h/o cystitis/nephrolithiasis, and CIN3 (diagnosed by colpo at 28w).   OB History    Para Term  AB Living   2 1 1 0 0 1   SAB IAB Ectopic Multiple Live Births   0 0 0 0 1      # Outcome Date GA Lbr Isidoro/2nd Weight Sex Delivery Anes PTL Lv   2 Current            1 Term 19 37w6d / 01:00 3.204 kg (7 lb 1 oz) M Vag-Spont EPI N JOSE      Name: VIANEY JURADO      Apgar1: 9  Apgar5: 9     Past Medical History:   Diagnosis Date    Abnormal Pap smear of cervix     Heart murmur     evaluated as a child, told not to be a problem    Scoliosis     didn' t need brace     Past Surgical History:   Procedure Laterality Date    breast augmentation      BREAST SURGERY      COSMETIC SURGERY      NOSE SURGERY      correct fracture of nose    TONSILLECTOMY, ADENOIDECTOMY         PTA Medications   Medication Sig    ondansetron (ZOFRAN-ODT) 4 MG TbDL Dissolve 1 tablet (4 mg total) by mouth every 6 (six) hours as needed (nausea).    oxyCODONE-acetaminophen (PERCOCET) 5-325 mg per tablet Take 1 tablet by mouth every 4 (four) hours as needed for Pain.    prenatal 105-iron-folic ac-dha 30 mg iron- 1.4 mg-300 mg Cmpk Take by mouth.    prochlorperazine (COMPAZINE) 5 MG tablet Take 1 tablet (5 mg total) by mouth every 6 (six) hours as needed for Nausea.    promethazine (PHENERGAN) 25 MG tablet Take 1 tablet (25 mg total) by mouth every 4 (four) hours.       Review of patient's allergies indicates:  No Known Allergies     Family History       Problem Relation (Age of Onset)    Hypertension Father    Stroke Mother          Tobacco Use    Smoking status: Never Smoker    Smokeless tobacco: Never Used   Substance and Sexual Activity    Alcohol use: Not Currently     Alcohol/week:  2.0 standard drinks     Types: 2 Glasses of wine per week     Comment: once a week    Drug use: No    Sexual activity: Yes     Partners: Male     Birth control/protection: OCP     Review of Systems   Constitutional:  Negative for appetite change, diaphoresis, fever and unexpected weight change.   Eyes:  Negative for visual disturbance.   Respiratory:  Negative for cough.    Cardiovascular:  Negative for chest pain and leg swelling.   Gastrointestinal:  Positive for abdominal pain and nausea. Negative for constipation, diarrhea and vomiting.        RUQ pain    Genitourinary:  Negative for vaginal bleeding.   Musculoskeletal:  Negative for arthralgias and back pain.   Integumentary:  Negative for nipple discharge.   Neurological:  Negative for headaches.   Psychiatric/Behavioral:  The patient is not nervous/anxious.    Breast: Negative for nipple discharge   Objective:     Vital Signs (Most Recent):  Temp: 98.1 °F (36.7 °C) (07/19/22 1700)  Pulse: 78 (07/19/22 1700)  Resp: 18 (07/19/22 1700)  BP: (!) 148/81 (07/19/22 1700)  SpO2: 99 % (07/19/22 1700)   Vital Signs (24h Range):  Temp:  [98.1 °F (36.7 °C)] 98.1 °F (36.7 °C)  Pulse:  [73-98] 78  Resp:  [18] 18  SpO2:  [99 %-100 %] 99 %  BP: (124-154)/(71-81) 148/81        There is no height or weight on file to calculate BMI.    FHT: 145 bpm, mod variability, + accels, - decels (reassuring)  TOCO:  Q 3-5 minutes    Physical Exam:   Constitutional: She is oriented to person, place, and time. She appears well-developed and well-nourished.    HENT:   Head: Normocephalic.    Eyes: Pupils are equal, round, and reactive to light.     Cardiovascular:  Normal rate.             Pulmonary/Chest: Effort normal. No respiratory distress.        Abdominal: Soft. Bowel sounds are normal. There is abdominal tenderness in the right upper quadrant and epigastric area. There is no rebound, no guarding, no tenderness at McBurney's point, no right CVA tenderness and no left CVA tenderness.      Genitourinary:    Vagina normal.   No  no vaginal discharge in the vagina.           Musculoskeletal: Normal range of motion.       Neurological: She is alert and oriented to person, place, and time.    Skin: Skin is warm and dry.    Psychiatric: She has a normal mood and affect. Her behavior is normal. Judgment and thought content normal.     Cervix:  Dilation:  0  Effacement:  50%  Station: -3  Presentation: Vertex     Significant Labs:  Lab Results   Component Value Date    GROUPTRH A POS 02/14/2022    HEPBSAG Negative 02/14/2022       Recent Labs   Lab 07/18/22  1508 07/19/22  1359   WBC 9.64 10.92   HGB 13.0 12.3   HCT 37.2 35.0*   MCV 89 89   * 122*      Recent Labs   Lab 07/18/22  1152 07/19/22  1359    138   K 3.7 3.9    104   CO2 26 22*   BUN 9 7   CREATININE 0.7 0.7   * 68*   PROT 6.8 6.8   BILITOT 0.5 0.6   ALKPHOS 75 73   ALT 63* 74*   AST 49* 59*       UA: negative     PCR: TLTC     Ammonia: 17 (wnl)     PTT wnl, PT wnl, INR wnl, Fibrinogen wnl     Uric Acid: 5.3

## 2022-07-19 NOTE — ASSESSMENT & PLAN NOTE
- Fetal Monitoring: NST BID   - Diet: Regular   - IVF: none indicated while adequate PO intake   - BSUS: vertex, EFW 1476g @ 31w2d   - CTX q 3-5 minutes; SVE 0.5/50/-3, unchanged after 2 hrs   - Vaginal delivery consents and blood consents signed bedside   - Low postpartum hemorrhage risk; will increase to medium if platelets drop below 100,000   - BMZ 07/19-07/20   - 3T labs collected    - Passed 1-hr glucose screen   - Tdap received this pregnancy   - Will continue PNV

## 2022-07-19 NOTE — ASSESSMENT & PLAN NOTE
- RUQ pain not relieved with GI cocktail in OB ED   - Start pepcid BID and oxy IR q 6 hrs PRN   - Patient with recent history of covid with liver enzymes wnl s/p covid diagnosis.   - RUQ Ultrasound: No acute cholecystitis. Hepatic parenchymal changes- possible steatosis or other infiltrative process. Possible hemangioma.   - Hepatitis Panel: pending; Hep B/C in 1st trimester negative   - Liver enzymes trending upward: 49/63 > 59/74  - Ammonia: wnl   - Will repeat CMP in AM

## 2022-07-20 DIAGNOSIS — O14.93 PRE-ECLAMPSIA IN THIRD TRIMESTER: Primary | ICD-10-CM

## 2022-07-20 PROBLEM — R74.01 TRANSAMINITIS: Status: RESOLVED | Noted: 2022-07-19 | Resolved: 2022-07-20

## 2022-07-20 PROBLEM — R03.0 ELEVATED BLOOD PRESSURE READING WITHOUT DIAGNOSIS OF HYPERTENSION: Status: RESOLVED | Noted: 2022-07-19 | Resolved: 2022-07-20

## 2022-07-20 LAB
ALBUMIN SERPL BCP-MCNC: 2.7 G/DL (ref 3.5–5.2)
ALP SERPL-CCNC: 75 U/L (ref 55–135)
ALT SERPL W/O P-5'-P-CCNC: 89 U/L (ref 10–44)
ANION GAP SERPL CALC-SCNC: 12 MMOL/L (ref 8–16)
AST SERPL-CCNC: 61 U/L (ref 10–40)
BASOPHILS # BLD AUTO: 0.01 K/UL (ref 0–0.2)
BASOPHILS NFR BLD: 0.1 % (ref 0–1.9)
BILIRUB SERPL-MCNC: 0.5 MG/DL (ref 0.1–1)
BUN SERPL-MCNC: 9 MG/DL (ref 6–20)
CALCIUM SERPL-MCNC: 9.5 MG/DL (ref 8.7–10.5)
CHLORIDE SERPL-SCNC: 106 MMOL/L (ref 95–110)
CO2 SERPL-SCNC: 19 MMOL/L (ref 23–29)
CREAT SERPL-MCNC: 0.7 MG/DL (ref 0.5–1.4)
DIFFERENTIAL METHOD: ABNORMAL
EOSINOPHIL # BLD AUTO: 0 K/UL (ref 0–0.5)
EOSINOPHIL NFR BLD: 0.1 % (ref 0–8)
ERYTHROCYTE [DISTWIDTH] IN BLOOD BY AUTOMATED COUNT: 13.1 % (ref 11.5–14.5)
EST. GFR  (AFRICAN AMERICAN): >60 ML/MIN/1.73 M^2
EST. GFR  (NON AFRICAN AMERICAN): >60 ML/MIN/1.73 M^2
GLUCOSE SERPL-MCNC: 111 MG/DL (ref 70–110)
HAV IGM SERPL QL IA: NEGATIVE
HBV CORE IGM SERPL QL IA: NEGATIVE
HBV SURFACE AG SERPL QL IA: NEGATIVE
HCT VFR BLD AUTO: 35.9 % (ref 37–48.5)
HCV AB SERPL QL IA: NEGATIVE
HGB BLD-MCNC: 12.5 G/DL (ref 12–16)
IMM GRANULOCYTES # BLD AUTO: 0.05 K/UL (ref 0–0.04)
IMM GRANULOCYTES NFR BLD AUTO: 0.5 % (ref 0–0.5)
LDH SERPL L TO P-CCNC: 193 U/L (ref 110–260)
LYMPHOCYTES # BLD AUTO: 1.7 K/UL (ref 1–4.8)
LYMPHOCYTES NFR BLD: 15.5 % (ref 18–48)
MCH RBC QN AUTO: 31 PG (ref 27–31)
MCHC RBC AUTO-ENTMCNC: 34.8 G/DL (ref 32–36)
MCV RBC AUTO: 89 FL (ref 82–98)
MONOCYTES # BLD AUTO: 0.3 K/UL (ref 0.3–1)
MONOCYTES NFR BLD: 3 % (ref 4–15)
NEUTROPHILS # BLD AUTO: 8.9 K/UL (ref 1.8–7.7)
NEUTROPHILS NFR BLD: 80.8 % (ref 38–73)
NRBC BLD-RTO: 0 /100 WBC
PLATELET # BLD AUTO: 111 K/UL (ref 150–450)
PMV BLD AUTO: 11.1 FL (ref 9.2–12.9)
POCT GLUCOSE: 106 MG/DL (ref 70–110)
POTASSIUM SERPL-SCNC: 4.5 MMOL/L (ref 3.5–5.1)
PROT SERPL-MCNC: 6.5 G/DL (ref 6–8.4)
RBC # BLD AUTO: 4.03 M/UL (ref 4–5.4)
RPR SER QL: NORMAL
SODIUM SERPL-SCNC: 137 MMOL/L (ref 136–145)
WBC # BLD AUTO: 11.04 K/UL (ref 3.9–12.7)

## 2022-07-20 PROCEDURE — 36415 COLL VENOUS BLD VENIPUNCTURE: CPT

## 2022-07-20 PROCEDURE — 96372 THER/PROPH/DIAG INJ SC/IM: CPT

## 2022-07-20 PROCEDURE — 99225 PR SUBSEQUENT OBSERVATION CARE,LEVEL II: CPT | Mod: 25,,, | Performed by: OBSTETRICS & GYNECOLOGY

## 2022-07-20 PROCEDURE — 76818 PR US, OB, FETAL BIOPHYSICAL, W/NST: ICD-10-PCS | Mod: 26,,, | Performed by: OBSTETRICS & GYNECOLOGY

## 2022-07-20 PROCEDURE — 99225 PR SUBSEQUENT OBSERVATION CARE,LEVEL II: ICD-10-PCS | Mod: 25,,, | Performed by: OBSTETRICS & GYNECOLOGY

## 2022-07-20 PROCEDURE — G0378 HOSPITAL OBSERVATION PER HR: HCPCS

## 2022-07-20 PROCEDURE — 76820 PR US, OB DOPPLER, FETAL UMBILICAL ARTERY ECHO: ICD-10-PCS | Mod: 26,,, | Performed by: OBSTETRICS & GYNECOLOGY

## 2022-07-20 PROCEDURE — 63600175 PHARM REV CODE 636 W HCPCS

## 2022-07-20 PROCEDURE — 76816 PR  US,PREGNANT UTERUS,F/U,TRANSABD APP: ICD-10-PCS | Mod: 26,,, | Performed by: OBSTETRICS & GYNECOLOGY

## 2022-07-20 PROCEDURE — 76820 UMBILICAL ARTERY ECHO: CPT | Mod: 26,,, | Performed by: OBSTETRICS & GYNECOLOGY

## 2022-07-20 PROCEDURE — 25000003 PHARM REV CODE 250

## 2022-07-20 PROCEDURE — 80053 COMPREHEN METABOLIC PANEL: CPT

## 2022-07-20 PROCEDURE — 76818 FETAL BIOPHYS PROFILE W/NST: CPT | Mod: 26,,, | Performed by: OBSTETRICS & GYNECOLOGY

## 2022-07-20 PROCEDURE — 83615 LACTATE (LD) (LDH) ENZYME: CPT

## 2022-07-20 PROCEDURE — 85025 COMPLETE CBC W/AUTO DIFF WBC: CPT

## 2022-07-20 PROCEDURE — 76816 OB US FOLLOW-UP PER FETUS: CPT | Mod: 26,,, | Performed by: OBSTETRICS & GYNECOLOGY

## 2022-07-20 PROCEDURE — 76815 OB US LIMITED FETUS(S): CPT

## 2022-07-20 RX ADMIN — FAMOTIDINE 40 MG: 20 TABLET ORAL at 08:07

## 2022-07-20 RX ADMIN — BETAMETHASONE SODIUM PHOSPHATE AND BETAMETHASONE ACETATE 12 MG: 3; 3 INJECTION, SUSPENSION INTRA-ARTICULAR; INTRALESIONAL; INTRAMUSCULAR at 05:07

## 2022-07-20 RX ADMIN — FAMOTIDINE 40 MG: 20 TABLET ORAL at 09:07

## 2022-07-20 RX ADMIN — PRENATAL VIT W/ FE FUMARATE-FA TAB 27-0.8 MG 1 TABLET: 27-0.8 TAB at 09:07

## 2022-07-20 NOTE — ASSESSMENT & PLAN NOTE
- Platelets downward trendin > 122> 111   - Uric Acid wnl   - LDH wnl   - Coags wnl (PT-INR, PTT, Fibrinogen)   - Will repeat CBC tomorrow AM

## 2022-07-20 NOTE — ASSESSMENT & PLAN NOTE
- Reports resolution of pain overnight.   - Start pepcid BID and oxy IR q 6 hrs PRN. Has not required pain medication overnight.   - Patient with recent history of covid with liver enzymes wnl s/p covid diagnosis.   - RUQ Ultrasound: No acute cholecystitis. Hepatic parenchymal changes- possible steatosis or other infiltrative process. Possible hemangioma.   - Hepatitis Panel: pending; Hep B/C in 1st trimester negative   - Liver enzymes trending upward: 49/63 > 59/74> 61/89   - Ammonia: wnl   - Uric Acid: wnl

## 2022-07-20 NOTE — PROGRESS NOTES
[facility] - Antepartum ([place])  Obstetrics  Antepartum Progress Note    Patient Name: Chelsie Green  MRN: [id number]  Admission Date: 2022  Hospital Length of Stay: 0 days  Attending Physician: Melchor Canales MD  Primary Care Provider: Kalee Olvera MD    Subjective:     Principal Problem:<principal problem not specified>    HPI:  Chelsie Green is a 32 y.o. [id number] at 31w2d who presented to the OB ED complaining of RUQ pain and at home elevated BP (140-150s/90s). Describes pain as stabbing and radiates from epigastric area to RUQ. Present for 2-3 days. Pain worsens with palpation and is not relieved with OTC medications. Reports temporary relief with percocet PRN. Reports intermittent episodes of nausea and vomiting. Additionally, patient reports up trending elevated blood pressures at home with one severe blood pressure at outside facility.     Patient reports headache without vision changes and increased swelling in hands/ feet. Denies worsening shortness of breath, chest pain, or palpitations.     While in OB ED, patient's blood pressures consistently mild range (not yet meeting criteria for gHTN), liver enzymes (59/74) noted to be up trending, and GTP (platelets 122). Ultrasound completed in OB ED shows no evidence of acute cholecystitis and possible changes consistent with steatosis/ other infiltrative process and possible hemangioma. Decision made to admit patient to antepartum service for further evaluation in the setting of transminitis with mildly elevated blood pressures and gestational thrombocytopenia with platelets trending downward.      This IUP is complicated by transamnitis, GTP, elevated BP (not yet meeting criteria for gHTN), h/o covid (), h/o gHTN, GBS UTI, h/o cystitis/nephrolithiasis, and CIN3 (diagnosed by colpo at 28w).  Patient reports contractions, denies vaginal bleeding, denies LOF.   Fetal Movement: normal.          Hospital Course:  2022: Admit to antepartum  service for further evaluation of RUQ pain, mildly elevated BP, and GTP. BMZ -. Regular diet. NST BID. Patient reports irregular contractions. SVE 0.5/50/-3.  2022: NAEON. Pt reports RUQ pain resolved. Normotensive blood pressures overnight. Patient meets criteria for pre-eclampsia with severe features. Endorses irregular non-painful contractions. No other obstetric complaints.      Obstetric HPI:  Patient reports Yes contractions, active fetal movement, No vaginal bleeding , No loss of fluid.     This IUP is complicated by transamnitis, GTP, elevated BP (not yet meeting criteria for gHTN), h/o covid (), h/o gHTN, GBS UTI, h/o cystitis/nephrolithiasis, and CIN3 (diagnosed by colpo at 28w).   OB History    Para Term  AB Living   2 1 1 0 0 1   SAB IAB Ectopic Multiple Live Births   0 0 0 0 1      # Outcome Date GA Lbr Isidoro/2nd Weight Sex Delivery Anes PTL Lv   2 Current            1 Term 19 37w6d / 01:00 3.204 kg (7 lb 1 oz) M Vag-Spont EPI N JOSE      Name: VIANEY JURADO      Apgar1: 9  Apgar5: 9     Past Medical History:   Diagnosis Date    Abnormal Pap smear of cervix     Heart murmur     evaluated as a child, told not to be a problem    Scoliosis     didn' t need brace     Past Surgical History:   Procedure Laterality Date    breast augmentation      BREAST SURGERY      COSMETIC SURGERY      NOSE SURGERY      correct fracture of nose    TONSILLECTOMY, ADENOIDECTOMY         PTA Medications   Medication Sig    ondansetron (ZOFRAN-ODT) 4 MG TbDL Dissolve 1 tablet (4 mg total) by mouth every 6 (six) hours as needed (nausea).    oxyCODONE-acetaminophen (PERCOCET) 5-325 mg per tablet Take 1 tablet by mouth every 4 (four) hours as needed for Pain.    prenatal 105-iron-folic ac-dha 30 mg iron- 1.4 mg-300 mg Cmpk Take by mouth.    prochlorperazine (COMPAZINE) 5 MG tablet Take 1 tablet (5 mg total) by mouth every 6 (six) hours as needed for Nausea.     promethazine (PHENERGAN) 25 MG tablet Take 1 tablet (25 mg total) by mouth every 4 (four) hours.       Review of patient's allergies indicates:  No Known Allergies     Family History       Problem Relation (Age of Onset)    Hypertension Father    Stroke Mother          Tobacco Use    Smoking status: Never Smoker    Smokeless tobacco: Never Used   Substance and Sexual Activity    Alcohol use: Not Currently     Alcohol/week: 2.0 standard drinks     Types: 2 Glasses of wine per week     Comment: once a week    Drug use: No    Sexual activity: Yes     Partners: Male     Birth control/protection: OCP     Review of Systems   Constitutional:  Negative for appetite change, diaphoresis, fever and unexpected weight change.   Eyes:  Negative for visual disturbance.   Respiratory:  Negative for cough.    Cardiovascular:  Negative for chest pain and leg swelling.   Gastrointestinal:  Negative for abdominal pain, constipation, diarrhea, nausea and vomiting.        RUQ pain    Genitourinary:  Negative for vaginal bleeding.   Musculoskeletal:  Negative for arthralgias and back pain.   Integumentary:  Negative for nipple discharge.   Neurological:  Negative for headaches.   Psychiatric/Behavioral:  The patient is not nervous/anxious.    Breast: Negative for nipple discharge   Objective:     Vital Signs (Most Recent):  Temp: 98.3 °F (36.8 °C) (07/20/22 0930)  Pulse: 81 (07/20/22 1015)  Resp: 18 (07/20/22 0930)  BP: 133/72 (07/20/22 0930)  SpO2: 97 % (07/20/22 1015)   Vital Signs (24h Range):  Temp:  [97 °F (36.1 °C)-98.3 °F (36.8 °C)] 98.3 °F (36.8 °C)  Pulse:  [69-98] 81  Resp:  [16-18] 18  SpO2:  [95 %-100 %] 97 %  BP: (101-154)/(62-81) 133/72     FHT: 135 bpm, mod variability, + accels, - decels (reassuring)  TOCO:  Uterine irritability     Physical Exam:   Constitutional: She is oriented to person, place, and time. She appears well-developed and well-nourished.    HENT:   Head: Normocephalic.    Eyes: Pupils are equal,  round, and reactive to light.     Cardiovascular:  Normal rate.             Pulmonary/Chest: Effort normal. No respiratory distress.        Abdominal: Soft. Bowel sounds are normal. There is no abdominal tenderness. There is no rebound, no guarding, no tenderness at McBurney's point, no right CVA tenderness and no left CVA tenderness.     Genitourinary:    Vagina normal.   No  no vaginal discharge in the vagina.           Musculoskeletal: Normal range of motion.       Neurological: She is alert and oriented to person, place, and time.    Skin: Skin is warm and dry.    Psychiatric: She has a normal mood and affect. Her behavior is normal. Judgment and thought content normal.     Cervix:  Dilation:  0  Effacement:  50%  Station: -3  Presentation: Vertex     Significant Labs:  Lab Results   Component Value Date    GROUPTRH A POS 2022    HEPBSAG Negative 2022       Recent Labs   Lab 22  1508 22  1359 22  0412   WBC 9.64 10.92 11.04   HGB 13.0 12.3 12.5   HCT 37.2 35.0* 35.9*   MCV 89 89 89   * 122* 111*        Recent Labs   Lab 22  1152 22  1359 22  0411    138 137   K 3.7 3.9 4.5    104 106   CO2 26 22* 19*   BUN 9 7 9   CREATININE 0.7 0.7 0.7   * 68* 111*   PROT 6.8 6.8 6.5   BILITOT 0.5 0.6 0.5   ALKPHOS 75 73 75   ALT 63* 74* 89*   AST 49* 59* 61*         UA: negative     PCR: TLTC     Ammonia: 17 (wnl)     PTT wnl, PT wnl, INR wnl, Fibrinogen wnl     Uric Acid: 5.3     LDH: wnl     Assessment/Plan:     32 y.o. female  at 31w3d for:    Pre-eclampsia with Severe Features  - In the setting of gHTN with liver enzymes twice the upper limit of normal, patient now meets criteria for pre-eclampsia with severe features.  - BP: (101-154)/(62-81) 133/72  - CBC: 36/111  - CMP: Cr 0.7 AST/ALT 61/89   - Work up for transminitis    - Hepatitis Panel: negative    - RUQ Ultrasound: No acute cholecystitis. Hepatic parenchymal changes- possible   steatosis or other infiltrative process. Possible hemangioma.    - Ammonia: wnl   - Pepcid BID and oxy IR 5mg q 6 hrs PRN for symptomatic relief   - CBC/ CMP for tomorrow AM. If liver enzymes continue to be elevated, will plan for induction of labor in the setting of pre-eclampsia with severe features. Induction to be 12 hours after last dose of BMZ.     31 weeks gestation of pregnancy  - Fetal Monitoring: NST BID   - Diet: Regular   - IVF: none indicated while adequate PO intake   - BSUS: vertex, EFW 1476g @ 31w2d; will get official growth ultrasound this afternoon   - CTX q 3-5 minutes; SVE 0.5/50/-3, unchanged after 2 hrs   - Vaginal delivery consents and blood consents signed bedside   - Low postpartum hemorrhage risk; will increase to medium if platelets drop below 100,000   - BMZ -   - 3T labs collected    - Passed 1-hr glucose screen   - Tdap received this pregnancy   - Will continue PNV    Benign gestational thrombocytopenia in third trimester  - Platelets downward trendin > 122> 111   - Uric Acid wnl   - LDH wnl   - Coags wnl (PT-INR, PTT, Fibrinogen)   - Will repeat CBC tomorrow AM      Group B Streptococcus urinary tract infection affecting pregnancy in third trimester  - GBS not repeated on admission due to GBS UTI   - Will administer PCN intrapartum     Yvonne Conner MD  Obstetrics  [facility] - Antepartum ([place])    I have reviewed and agree with progress note, assessment and plan as written by Dr. Conner. Patient is a 32 y.o.  at 31w3d admitted to [facility] service for preeclampsia with severe features. Patient initially presented with elevated BP, transaminitis, and RUQ pain. Patient seen at bedside this am, RUQ pain has resolved, no pre-e ROS at this time.     Temp:  [97 °F (36.1 °C)-98.3 °F (36.8 °C)] 98.3 °F (36.8 °C)  Pulse:  [69-98] 81  Resp:  [16-18] 18  SpO2:  [95 %-100 %] 97 %  BP: (101-154)/(62-81) 133/72    NST: baseline 130, reactive, + accels, no decels  [place]: no ctx    1.  Pre-eclampsia with severe features  -BMZ dose 2 due at 1600 this pm  -BID NST  -growth scan to be performed today  -labs performed this am. ALT now twice the upper limit of normal. All other labs and work up negative for alternative diagnosis which could result in transaminitis.  -Discussed with GI regarding the possibility of an alternative diagnosis, work up negative  -Platelet count is continuing to trend down as well, now 111.   -we have discussed these findings with the patient and the recommendation for delivery given pre-e with severe features in the presence of laboratory abnormalities which is a contraindication to expectant management  -BP stable and patient with no other severe signs or symptoms. Prior vaginal delivery. Will plan for induction of labor on 7/21, 12 hours after administration of BMZ dose 2. Will need mag sulfate for labor for both seizure ppx and fetal neuroprotection. Continue mag for 24 hours postpartum.    Gary Costa MD  PGY 6  Maternal Fetal Medicine  [facility]

## 2022-07-20 NOTE — ASSESSMENT & PLAN NOTE
- Fetal Monitoring: NST BID   - Diet: Regular   - IVF: none indicated while adequate PO intake   - BSUS: vertex, EFW 1476g @ 31w2d; will get official growth ultrasound this afternoon   - CTX q 3-5 minutes; SVE 0.5/50/-3, unchanged after 2 hrs   - Vaginal delivery consents and blood consents signed bedside   - Low postpartum hemorrhage risk; will increase to medium if platelets drop below 100,000   - BMZ 07/19-07/20   - 3T labs collected    - Passed 1-hr glucose screen   - Tdap received this pregnancy   - Will continue PNV

## 2022-07-20 NOTE — SUBJECTIVE & OBJECTIVE
Obstetric HPI:  Patient reports Yes contractions, active fetal movement, No vaginal bleeding , No loss of fluid.     This IUP is complicated by transamnitis, GTP, elevated BP (not yet meeting criteria for gHTN), h/o covid (), h/o gHTN, GBS UTI, h/o cystitis/nephrolithiasis, and CIN3 (diagnosed by colpo at 28w).   OB History    Para Term  AB Living   2 1 1 0 0 1   SAB IAB Ectopic Multiple Live Births   0 0 0 0 1      # Outcome Date GA Lbr Isidoro/2nd Weight Sex Delivery Anes PTL Lv   2 Current            1 Term 19 37w6d / 01:00 3.204 kg (7 lb 1 oz) M Vag-Spont EPI N JOSE      Name: VIANEY JURADO      Apgar1: 9  Apgar5: 9     Past Medical History:   Diagnosis Date    Abnormal Pap smear of cervix     Heart murmur     evaluated as a child, told not to be a problem    Scoliosis     didn' t need brace     Past Surgical History:   Procedure Laterality Date    breast augmentation      BREAST SURGERY      COSMETIC SURGERY      NOSE SURGERY      correct fracture of nose    TONSILLECTOMY, ADENOIDECTOMY         PTA Medications   Medication Sig    ondansetron (ZOFRAN-ODT) 4 MG TbDL Dissolve 1 tablet (4 mg total) by mouth every 6 (six) hours as needed (nausea).    oxyCODONE-acetaminophen (PERCOCET) 5-325 mg per tablet Take 1 tablet by mouth every 4 (four) hours as needed for Pain.    prenatal 105-iron-folic ac-dha 30 mg iron- 1.4 mg-300 mg Cmpk Take by mouth.    prochlorperazine (COMPAZINE) 5 MG tablet Take 1 tablet (5 mg total) by mouth every 6 (six) hours as needed for Nausea.    promethazine (PHENERGAN) 25 MG tablet Take 1 tablet (25 mg total) by mouth every 4 (four) hours.       Review of patient's allergies indicates:  No Known Allergies     Family History       Problem Relation (Age of Onset)    Hypertension Father    Stroke Mother          Tobacco Use    Smoking status: Never Smoker    Smokeless tobacco: Never Used   Substance and Sexual Activity    Alcohol use: Not Currently     Alcohol/week:  2.0 standard drinks     Types: 2 Glasses of wine per week     Comment: once a week    Drug use: No    Sexual activity: Yes     Partners: Male     Birth control/protection: OCP     Review of Systems   Constitutional:  Negative for appetite change, diaphoresis, fever and unexpected weight change.   Eyes:  Negative for visual disturbance.   Respiratory:  Negative for cough.    Cardiovascular:  Negative for chest pain and leg swelling.   Gastrointestinal:  Negative for abdominal pain, constipation, diarrhea, nausea and vomiting.        RUQ pain    Genitourinary:  Negative for vaginal bleeding.   Musculoskeletal:  Negative for arthralgias and back pain.   Integumentary:  Negative for nipple discharge.   Neurological:  Negative for headaches.   Psychiatric/Behavioral:  The patient is not nervous/anxious.    Breast: Negative for nipple discharge   Objective:     Vital Signs (Most Recent):  Temp: 98.3 °F (36.8 °C) (07/20/22 0930)  Pulse: 81 (07/20/22 1015)  Resp: 18 (07/20/22 0930)  BP: 133/72 (07/20/22 0930)  SpO2: 97 % (07/20/22 1015)   Vital Signs (24h Range):  Temp:  [97 °F (36.1 °C)-98.3 °F (36.8 °C)] 98.3 °F (36.8 °C)  Pulse:  [69-98] 81  Resp:  [16-18] 18  SpO2:  [95 %-100 %] 97 %  BP: (101-154)/(62-81) 133/72     FHT: 135 bpm, mod variability, + accels, - decels (reassuring)  TOCO:  Uterine irritability     Physical Exam:   Constitutional: She is oriented to person, place, and time. She appears well-developed and well-nourished.    HENT:   Head: Normocephalic.    Eyes: Pupils are equal, round, and reactive to light.     Cardiovascular:  Normal rate.             Pulmonary/Chest: Effort normal. No respiratory distress.        Abdominal: Soft. Bowel sounds are normal. There is no abdominal tenderness. There is no rebound, no guarding, no tenderness at McBurney's point, no right CVA tenderness and no left CVA tenderness.     Genitourinary:    Vagina normal.   No  no vaginal discharge in the vagina.            Musculoskeletal: Normal range of motion.       Neurological: She is alert and oriented to person, place, and time.    Skin: Skin is warm and dry.    Psychiatric: She has a normal mood and affect. Her behavior is normal. Judgment and thought content normal.     Cervix:  Dilation:  0  Effacement:  50%  Station: -3  Presentation: Vertex     Significant Labs:  Lab Results   Component Value Date    GROUPTRH A POS 02/14/2022    HEPBSAG Negative 07/19/2022       Recent Labs   Lab 07/18/22  1508 07/19/22  1359 07/20/22  0412   WBC 9.64 10.92 11.04   HGB 13.0 12.3 12.5   HCT 37.2 35.0* 35.9*   MCV 89 89 89   * 122* 111*        Recent Labs   Lab 07/18/22  1152 07/19/22  1359 07/20/22  0411    138 137   K 3.7 3.9 4.5    104 106   CO2 26 22* 19*   BUN 9 7 9   CREATININE 0.7 0.7 0.7   * 68* 111*   PROT 6.8 6.8 6.5   BILITOT 0.5 0.6 0.5   ALKPHOS 75 73 75   ALT 63* 74* 89*   AST 49* 59* 61*         UA: negative     PCR: TLTC     Ammonia: 17 (wnl)     PTT wnl, PT wnl, INR wnl, Fibrinogen wnl     Uric Acid: 5.3     LDH: wnl

## 2022-07-20 NOTE — ANESTHESIA PREPROCEDURE EVALUATION
[facility]  Anesthesia Pre-Operative Evaluation         Patient Name: Chelsie Green  YOB: 1990  MRN: [id number]    2022      Chelsie Green is a 32 y.o. female  @ 31w2d who presents to the OB ED with RUQ and elevated Bps (not yet gHTN). IUP c/b transaminitis, benign thrombocytopenia. PMHx includes UTI/cysitis, heart murmur, scoliosis, and gHTN in the past. Denies cHTN, asthma, DM, bleeding diathesis, anticoag, prior spinal surgery.      Recent Labs     22  1152 22  1508 22  1359   ALT 63*  --  74*   AST 49*  --  59*   PLT  --  144* 122*       OB History    Para Term  AB Living   2 1 1     1   SAB IAB Ectopic Multiple Live Births         0 1      # Outcome Date GA Lbr Isidoro/2nd Weight Sex Delivery Anes PTL Lv   2 Current            1 Term 19 37w6d / 01:00 3.204 kg (7 lb 1 oz) M Vag-Spont EPI N JOSE       Review of patient's allergies indicates:  No Known Allergies    Wt Readings from Last 1 Encounters:   22 2108 73.2 kg (161 lb 6 oz)       BP Readings from Last 3 Encounters:   22 127/69   22 137/75   22 118/70       Patient Active Problem List   Diagnosis    ASCUS (atypical squamous cells of undetermined significance) on Pap smear    Chronic right-sided low back pain with sciatica    Episcleritis of right eye    High grade squamous intraepithelial lesion (HGSIL), grade 3 OSVALDO, on biopsy of cervix    Transaminitis    History of Gestational hypertension    Elevated blood pressure    Group B Streptococcus urinary tract infection affecting pregnancy in third trimester    Benign gestational thrombocytopenia in third trimester    31 weeks gestation of pregnancy       Past Surgical History:   Procedure Laterality Date    breast augmentation      BREAST SURGERY      COSMETIC SURGERY      NOSE SURGERY      correct fracture of nose    TONSILLECTOMY, ADENOIDECTOMY         Social History     Socioeconomic  History    Marital status:    Tobacco Use    Smoking status: Never Smoker    Smokeless tobacco: Never Used   Substance and Sexual Activity    Alcohol use: Not Currently     Alcohol/week: 2.0 standard drinks     Types: 2 Glasses of wine per week     Comment: once a week    Drug use: No    Sexual activity: Yes     Partners: Male     Birth control/protection: OCP   Social History Narrative    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 no children         Chemistry        Component Value Date/Time     07/19/2022 1359    K 3.9 07/19/2022 1359     07/19/2022 1359    CO2 22 (L) 07/19/2022 1359    BUN 7 07/19/2022 1359    CREATININE 0.7 07/19/2022 1359    GLU 68 (L) 07/19/2022 1359        Component Value Date/Time    CALCIUM 9.9 07/19/2022 1359    ALKPHOS 73 07/19/2022 1359    AST 59 (H) 07/19/2022 1359    ALT 74 (H) 07/19/2022 1359    BILITOT 0.6 07/19/2022 1359    ESTGFRAFRICA >60 07/19/2022 1359    EGFRNONAA >60 07/19/2022 1359            Lab Results   Component Value Date    WBC 10.92 07/19/2022    HGB 12.3 07/19/2022    HCT 35.0 (L) 07/19/2022    MCV 89 07/19/2022     (L) 07/19/2022       Recent Labs     07/19/22  1619   INR 0.9   APTT 27.8           Pre-op Assessment    I have reviewed the Patient Summary Reports.     I have reviewed the Nursing Notes. I have reviewed the NPO Status.   I have reviewed the Medications.     Review of Systems  Anesthesia Hx:  No problems with previous Anesthesia  Denies Family Hx of Anesthesia complications.   Denies Personal Hx of Anesthesia complications.   Social:  Non-Smoker    Hematology/Oncology:  Hematology Normal   Oncology Normal     EENT/Dental:EENT/Dental Normal   Cardiovascular:  Cardiovascular Normal     Pulmonary:  Pulmonary Normal    Renal/:  Renal/ Normal     Hepatic/GI:  Hepatic/GI Normal    Musculoskeletal:  Musculoskeletal Normal    Neurological:  Neurology Normal    Endocrine:  Endocrine Normal    Psych:  Psychiatric  Normal           Physical Exam  General: Well nourished, Cooperative and Alert    Airway:  Mallampati: III   Mouth Opening: Normal  TM Distance: Normal  Tongue: Normal  Neck ROM: Normal ROM    Dental:  Intact    Chest/Lungs:  Normal Respiratory Rate    Heart:  Rate: Normal  Rhythm: Regular Rhythm        Anesthesia Plan  Type of Anesthesia, risks & benefits discussed:    Anesthesia Type: Gen ETT, MAC, Epidural, Spinal, CSE  Intra-op Monitoring Plan: Standard ASA Monitors  Post Op Pain Control Plan: multimodal analgesia, IV/PO Opioids PRN, epidural analgesia and intrathecal opioid  Airway Plan: Video, Post-Induction  Informed Consent: Informed consent signed with the Patient and all parties understand the risks and agree with anesthesia plan.  All questions answered.   ASA Score: 3  Day of Surgery Review of History & Physical: H&P Update referred to the surgeon/provider.    Ready For Surgery From Anesthesia Perspective.     .

## 2022-07-20 NOTE — ASSESSMENT & PLAN NOTE
- In the setting of gHTN with liver enzymes twice the upper limit of normal, patient now meets criteria for pre-eclampsia with severe features.  - BP: (101-154)/(62-81) 133/72  - CBC: 12/36/111  - CMP: Cr 0.7 AST/ALT 61/89   - Work up for transminitis    - Hepatitis Panel: negative    - RUQ Ultrasound: No acute cholecystitis. Hepatic parenchymal changes- possible steatosis or other infiltrative process. Possible hemangioma.    - Ammonia: wnl   - Pepcid BID and oxy IR 5mg q 6 hrs PRN for symptomatic relief   - CBC/ CMP for tomorrow AM. If liver enzymes continue to be elevated, will plan for induction of labor in the setting of pre-eclampsia with severe features. Induction to be 12 hours after last dose of BMZ.

## 2022-07-21 ENCOUNTER — ANESTHESIA (OUTPATIENT)
Dept: OBSTETRICS AND GYNECOLOGY | Facility: OTHER | Age: 32
End: 2022-07-21
Payer: COMMERCIAL

## 2022-07-21 ENCOUNTER — ANESTHESIA EVENT (OUTPATIENT)
Dept: OBSTETRICS AND GYNECOLOGY | Facility: OTHER | Age: 32
End: 2022-07-21
Payer: COMMERCIAL

## 2022-07-21 PROBLEM — B95.1 GROUP B STREPTOCOCCUS URINARY TRACT INFECTION AFFECTING PREGNANCY IN THIRD TRIMESTER: Status: RESOLVED | Noted: 2022-07-19 | Resolved: 2022-07-21

## 2022-07-21 PROBLEM — O23.43 GROUP B STREPTOCOCCUS URINARY TRACT INFECTION AFFECTING PREGNANCY IN THIRD TRIMESTER: Status: RESOLVED | Noted: 2022-07-19 | Resolved: 2022-07-21

## 2022-07-21 PROBLEM — Z3A.31 31 WEEKS GESTATION OF PREGNANCY: Status: RESOLVED | Noted: 2022-07-19 | Resolved: 2022-07-21

## 2022-07-21 LAB
ABO + RH BLD: NORMAL
ALBUMIN SERPL BCP-MCNC: 3.1 G/DL (ref 3.5–5.2)
ALP SERPL-CCNC: 86 U/L (ref 55–135)
ALT SERPL W/O P-5'-P-CCNC: 81 U/L (ref 10–44)
ANION GAP SERPL CALC-SCNC: 13 MMOL/L (ref 8–16)
APTT BLDCRRT: 25.6 SEC (ref 21–32)
AST SERPL-CCNC: 44 U/L (ref 10–40)
BASOPHILS # BLD AUTO: 0.03 K/UL (ref 0–0.2)
BASOPHILS NFR BLD: 0.2 % (ref 0–1.9)
BILIRUB SERPL-MCNC: 0.3 MG/DL (ref 0.1–1)
BLD GP AB SCN CELLS X3 SERPL QL: NORMAL
BUN SERPL-MCNC: 8 MG/DL (ref 6–20)
CALCIUM SERPL-MCNC: 9.2 MG/DL (ref 8.7–10.5)
CHLORIDE SERPL-SCNC: 106 MMOL/L (ref 95–110)
CO2 SERPL-SCNC: 21 MMOL/L (ref 23–29)
CREAT SERPL-MCNC: 0.7 MG/DL (ref 0.5–1.4)
DIFFERENTIAL METHOD: ABNORMAL
EOSINOPHIL # BLD AUTO: 0 K/UL (ref 0–0.5)
EOSINOPHIL NFR BLD: 0.1 % (ref 0–8)
ERYTHROCYTE [DISTWIDTH] IN BLOOD BY AUTOMATED COUNT: 13.2 % (ref 11.5–14.5)
EST. GFR  (AFRICAN AMERICAN): >60 ML/MIN/1.73 M^2
EST. GFR  (NON AFRICAN AMERICAN): >60 ML/MIN/1.73 M^2
FIBRINOGEN PPP-MCNC: 495 MG/DL (ref 182–400)
GLUCOSE SERPL-MCNC: 111 MG/DL (ref 70–110)
HCO3 UR-SCNC: 24.8 MMOL/L (ref 24–28)
HCT VFR BLD AUTO: 39.1 % (ref 37–48.5)
HCT VFR BLD CALC: 53 %PCV (ref 36–54)
HGB BLD-MCNC: 13.5 G/DL (ref 12–16)
HGB BLD-MCNC: 18 G/DL
IMM GRANULOCYTES # BLD AUTO: 0.18 K/UL (ref 0–0.04)
IMM GRANULOCYTES NFR BLD AUTO: 1.4 % (ref 0–0.5)
INR PPP: 0.8 (ref 0.8–1.2)
LYMPHOCYTES # BLD AUTO: 2.5 K/UL (ref 1–4.8)
LYMPHOCYTES NFR BLD: 19.5 % (ref 18–48)
MCH RBC QN AUTO: 31 PG (ref 27–31)
MCHC RBC AUTO-ENTMCNC: 34.5 G/DL (ref 32–36)
MCV RBC AUTO: 90 FL (ref 82–98)
MONOCYTES # BLD AUTO: 0.5 K/UL (ref 0.3–1)
MONOCYTES NFR BLD: 4 % (ref 4–15)
NEUTROPHILS # BLD AUTO: 9.4 K/UL (ref 1.8–7.7)
NEUTROPHILS NFR BLD: 74.8 % (ref 38–73)
NRBC BLD-RTO: 0 /100 WBC
PCO2 BLDA: 49.6 MMHG (ref 35–45)
PH SMN: 7.31 [PH] (ref 7.35–7.45)
PLATELET # BLD AUTO: 148 K/UL (ref 150–450)
PMV BLD AUTO: 11.1 FL (ref 9.2–12.9)
PO2 BLDA: 27 MMHG (ref 80–100)
POC BE: -2 MMOL/L
POC IONIZED CALCIUM: 1.34 MMOL/L (ref 1.06–1.42)
POC SATURATED O2: 43 % (ref 95–100)
POC TCO2: 26 MMOL/L (ref 23–27)
POTASSIUM BLD-SCNC: 4.3 MMOL/L (ref 3.5–5.1)
POTASSIUM SERPL-SCNC: 4 MMOL/L (ref 3.5–5.1)
PROT SERPL-MCNC: 7.3 G/DL (ref 6–8.4)
PROTHROMBIN TIME: 9.2 SEC (ref 9–12.5)
RBC # BLD AUTO: 4.35 M/UL (ref 4–5.4)
SAMPLE: ABNORMAL
SODIUM BLD-SCNC: 134 MMOL/L (ref 136–145)
SODIUM SERPL-SCNC: 140 MMOL/L (ref 136–145)
WBC # BLD AUTO: 12.62 K/UL (ref 3.9–12.7)

## 2022-07-21 PROCEDURE — 27200710 HC EPIDURAL INFUSION PUMP SET: Performed by: ANESTHESIOLOGY

## 2022-07-21 PROCEDURE — 80053 COMPREHEN METABOLIC PANEL: CPT

## 2022-07-21 PROCEDURE — 25000003 PHARM REV CODE 250

## 2022-07-21 PROCEDURE — 72200006 HC VAGINAL DELIVERY LEVEL III

## 2022-07-21 PROCEDURE — 99900035 HC TECH TIME PER 15 MIN (STAT)

## 2022-07-21 PROCEDURE — 63600175 PHARM REV CODE 636 W HCPCS: Performed by: STUDENT IN AN ORGANIZED HEALTH CARE EDUCATION/TRAINING PROGRAM

## 2022-07-21 PROCEDURE — 96375 TX/PRO/DX INJ NEW DRUG ADDON: CPT | Performed by: OBSTETRICS & GYNECOLOGY

## 2022-07-21 PROCEDURE — 25000003 PHARM REV CODE 250: Performed by: STUDENT IN AN ORGANIZED HEALTH CARE EDUCATION/TRAINING PROGRAM

## 2022-07-21 PROCEDURE — 96361 HYDRATE IV INFUSION ADD-ON: CPT | Performed by: OBSTETRICS & GYNECOLOGY

## 2022-07-21 PROCEDURE — 59409 PRA ETRICAL CARE,VAG DELIV ONLY: ICD-10-PCS | Mod: QY,,, | Performed by: ANESTHESIOLOGY

## 2022-07-21 PROCEDURE — C1751 CATH, INF, PER/CENT/MIDLINE: HCPCS | Performed by: ANESTHESIOLOGY

## 2022-07-21 PROCEDURE — 86850 RBC ANTIBODY SCREEN: CPT

## 2022-07-21 PROCEDURE — 11000001 HC ACUTE MED/SURG PRIVATE ROOM

## 2022-07-21 PROCEDURE — 85610 PROTHROMBIN TIME: CPT

## 2022-07-21 PROCEDURE — 59409 OBSTETRICAL CARE: CPT | Mod: QY,,, | Performed by: ANESTHESIOLOGY

## 2022-07-21 PROCEDURE — 96368 THER/DIAG CONCURRENT INF: CPT | Performed by: OBSTETRICS & GYNECOLOGY

## 2022-07-21 PROCEDURE — 59400 OBSTETRICAL CARE: CPT | Mod: ,,, | Performed by: OBSTETRICS & GYNECOLOGY

## 2022-07-21 PROCEDURE — 96365 THER/PROPH/DIAG IV INF INIT: CPT | Performed by: OBSTETRICS & GYNECOLOGY

## 2022-07-21 PROCEDURE — 82803 BLOOD GASES ANY COMBINATION: CPT

## 2022-07-21 PROCEDURE — 63600175 PHARM REV CODE 636 W HCPCS

## 2022-07-21 PROCEDURE — 62326 NJX INTERLAMINAR LMBR/SAC: CPT | Performed by: STUDENT IN AN ORGANIZED HEALTH CARE EDUCATION/TRAINING PROGRAM

## 2022-07-21 PROCEDURE — 85025 COMPLETE CBC W/AUTO DIFF WBC: CPT

## 2022-07-21 PROCEDURE — 36416 COLLJ CAPILLARY BLOOD SPEC: CPT

## 2022-07-21 PROCEDURE — 59400 PR FULL ROUT OBSTE CARE,VAGINAL DELIV: ICD-10-PCS | Mod: ,,, | Performed by: OBSTETRICS & GYNECOLOGY

## 2022-07-21 PROCEDURE — 85384 FIBRINOGEN ACTIVITY: CPT

## 2022-07-21 PROCEDURE — 85730 THROMBOPLASTIN TIME PARTIAL: CPT

## 2022-07-21 PROCEDURE — 51702 INSERT TEMP BLADDER CATH: CPT

## 2022-07-21 PROCEDURE — 36415 COLL VENOUS BLD VENIPUNCTURE: CPT

## 2022-07-21 RX ORDER — OXYTOCIN/RINGER'S LACTATE 30/500 ML
0-30 PLASTIC BAG, INJECTION (ML) INTRAVENOUS CONTINUOUS
Status: DISCONTINUED | OUTPATIENT
Start: 2022-07-21 | End: 2022-07-21

## 2022-07-21 RX ORDER — CARBOPROST TROMETHAMINE 250 UG/ML
250 INJECTION, SOLUTION INTRAMUSCULAR
Status: CANCELLED | OUTPATIENT
Start: 2022-07-21

## 2022-07-21 RX ORDER — PROCHLORPERAZINE EDISYLATE 5 MG/ML
5 INJECTION INTRAMUSCULAR; INTRAVENOUS EVERY 6 HOURS PRN
Status: DISCONTINUED | OUTPATIENT
Start: 2022-07-21 | End: 2022-07-23 | Stop reason: HOSPADM

## 2022-07-21 RX ORDER — SODIUM CHLORIDE, SODIUM LACTATE, POTASSIUM CHLORIDE, CALCIUM CHLORIDE 600; 310; 30; 20 MG/100ML; MG/100ML; MG/100ML; MG/100ML
INJECTION, SOLUTION INTRAVENOUS CONTINUOUS
Status: DISCONTINUED | OUTPATIENT
Start: 2022-07-21 | End: 2022-07-21

## 2022-07-21 RX ORDER — FAMOTIDINE 10 MG/ML
20 INJECTION INTRAVENOUS ONCE
Status: DISCONTINUED | OUTPATIENT
Start: 2022-07-22 | End: 2022-07-22

## 2022-07-21 RX ORDER — HYDROCODONE BITARTRATE AND ACETAMINOPHEN 5; 325 MG/1; MG/1
1 TABLET ORAL EVERY 4 HOURS PRN
Status: DISCONTINUED | OUTPATIENT
Start: 2022-07-21 | End: 2022-07-23 | Stop reason: HOSPADM

## 2022-07-21 RX ORDER — BUPIVACAINE HYDROCHLORIDE 2.5 MG/ML
INJECTION, SOLUTION EPIDURAL; INFILTRATION; INTRACAUDAL
Status: DISPENSED
Start: 2022-07-21 | End: 2022-07-22

## 2022-07-21 RX ORDER — DIPHENHYDRAMINE HCL 25 MG
25 CAPSULE ORAL EVERY 4 HOURS PRN
Status: DISCONTINUED | OUTPATIENT
Start: 2022-07-21 | End: 2022-07-23 | Stop reason: HOSPADM

## 2022-07-21 RX ORDER — PROCHLORPERAZINE MALEATE 5 MG
10 TABLET ORAL ONCE
Status: COMPLETED | OUTPATIENT
Start: 2022-07-21 | End: 2022-07-21

## 2022-07-21 RX ORDER — CALCIUM GLUCONATE 98 MG/ML
1 INJECTION, SOLUTION INTRAVENOUS
Status: DISCONTINUED | OUTPATIENT
Start: 2022-07-21 | End: 2022-07-23 | Stop reason: HOSPADM

## 2022-07-21 RX ORDER — FENTANYL/BUPIVACAINE/NS/PF 2MCG/ML-.1
PLASTIC BAG, INJECTION (ML) INJECTION CONTINUOUS
Status: DISCONTINUED | OUTPATIENT
Start: 2022-07-22 | End: 2022-07-22

## 2022-07-21 RX ORDER — MISOPROSTOL 200 UG/1
800 TABLET ORAL
Status: CANCELLED | OUTPATIENT
Start: 2022-07-21

## 2022-07-21 RX ORDER — LIDOCAINE HYDROCHLORIDE AND EPINEPHRINE 15; 5 MG/ML; UG/ML
INJECTION, SOLUTION EPIDURAL
Status: DISCONTINUED | OUTPATIENT
Start: 2022-07-21 | End: 2022-07-21

## 2022-07-21 RX ORDER — MAGNESIUM SULFATE HEPTAHYDRATE 40 MG/ML
2 INJECTION, SOLUTION INTRAVENOUS CONTINUOUS
Status: DISCONTINUED | OUTPATIENT
Start: 2022-07-21 | End: 2022-07-22

## 2022-07-21 RX ORDER — ACETAMINOPHEN 325 MG/1
650 TABLET ORAL EVERY 6 HOURS PRN
Status: DISCONTINUED | OUTPATIENT
Start: 2022-07-21 | End: 2022-07-23 | Stop reason: HOSPADM

## 2022-07-21 RX ORDER — METHYLERGONOVINE MALEATE 0.2 MG/ML
200 INJECTION INTRAVENOUS
Status: CANCELLED | OUTPATIENT
Start: 2022-07-21

## 2022-07-21 RX ORDER — SODIUM CHLORIDE 0.9 % (FLUSH) 0.9 %
10 SYRINGE (ML) INJECTION
Status: DISCONTINUED | OUTPATIENT
Start: 2022-07-21 | End: 2022-07-23 | Stop reason: HOSPADM

## 2022-07-21 RX ORDER — ONDANSETRON 8 MG/1
8 TABLET, ORALLY DISINTEGRATING ORAL EVERY 8 HOURS PRN
Status: DISCONTINUED | OUTPATIENT
Start: 2022-07-21 | End: 2022-07-21

## 2022-07-21 RX ORDER — MISOPROSTOL 200 UG/1
TABLET ORAL
Status: COMPLETED
Start: 2022-07-21 | End: 2022-07-21

## 2022-07-21 RX ORDER — SIMETHICONE 80 MG
1 TABLET,CHEWABLE ORAL 4 TIMES DAILY PRN
Status: DISCONTINUED | OUTPATIENT
Start: 2022-07-21 | End: 2022-07-21

## 2022-07-21 RX ORDER — MISOPROSTOL 200 UG/1
200 TABLET ORAL EVERY 6 HOURS
Status: COMPLETED | OUTPATIENT
Start: 2022-07-22 | End: 2022-07-22

## 2022-07-21 RX ORDER — PRENATAL WITH FERROUS FUM AND FOLIC ACID 3080; 920; 120; 400; 22; 1.84; 3; 20; 10; 1; 12; 200; 27; 25; 2 [IU]/1; [IU]/1; MG/1; [IU]/1; MG/1; MG/1; MG/1; MG/1; MG/1; MG/1; UG/1; MG/1; MG/1; MG/1; MG/1
1 TABLET ORAL DAILY
Status: DISCONTINUED | OUTPATIENT
Start: 2022-07-22 | End: 2022-07-23 | Stop reason: HOSPADM

## 2022-07-21 RX ORDER — FENTANYL CITRATE 50 UG/ML
INJECTION, SOLUTION INTRAMUSCULAR; INTRAVENOUS
Status: COMPLETED
Start: 2022-07-21 | End: 2022-07-21

## 2022-07-21 RX ORDER — SODIUM CITRATE AND CITRIC ACID MONOHYDRATE 334; 500 MG/5ML; MG/5ML
30 SOLUTION ORAL ONCE
Status: DISCONTINUED | OUTPATIENT
Start: 2022-07-22 | End: 2022-07-22

## 2022-07-21 RX ORDER — FENTANYL CITRATE 50 UG/ML
INJECTION, SOLUTION INTRAMUSCULAR; INTRAVENOUS
Status: DISCONTINUED | OUTPATIENT
Start: 2022-07-21 | End: 2022-07-21

## 2022-07-21 RX ORDER — DIPHENHYDRAMINE HYDROCHLORIDE 50 MG/ML
25 INJECTION INTRAMUSCULAR; INTRAVENOUS EVERY 4 HOURS PRN
Status: DISCONTINUED | OUTPATIENT
Start: 2022-07-21 | End: 2022-07-23 | Stop reason: HOSPADM

## 2022-07-21 RX ORDER — CALCIUM CARBONATE 200(500)MG
500 TABLET,CHEWABLE ORAL 3 TIMES DAILY PRN
Status: DISCONTINUED | OUTPATIENT
Start: 2022-07-21 | End: 2022-07-21

## 2022-07-21 RX ORDER — OXYTOCIN/RINGER'S LACTATE 30/500 ML
334 PLASTIC BAG, INJECTION (ML) INTRAVENOUS ONCE
Status: DISCONTINUED | OUTPATIENT
Start: 2022-07-21 | End: 2022-07-21

## 2022-07-21 RX ORDER — OXYTOCIN/RINGER'S LACTATE 30/500 ML
95 PLASTIC BAG, INJECTION (ML) INTRAVENOUS ONCE
Status: DISCONTINUED | OUTPATIENT
Start: 2022-07-21 | End: 2022-07-22

## 2022-07-21 RX ORDER — MAGNESIUM SULFATE HEPTAHYDRATE 40 MG/ML
6 INJECTION, SOLUTION INTRAVENOUS ONCE
Status: COMPLETED | OUTPATIENT
Start: 2022-07-21 | End: 2022-07-21

## 2022-07-21 RX ORDER — PROCHLORPERAZINE EDISYLATE 5 MG/ML
5 INJECTION INTRAMUSCULAR; INTRAVENOUS EVERY 6 HOURS PRN
Status: DISCONTINUED | OUTPATIENT
Start: 2022-07-21 | End: 2022-07-21

## 2022-07-21 RX ORDER — CARBOPROST TROMETHAMINE 250 UG/ML
INJECTION, SOLUTION INTRAMUSCULAR
Status: DISCONTINUED
Start: 2022-07-21 | End: 2022-07-21 | Stop reason: WASHOUT

## 2022-07-21 RX ORDER — FENTANYL/BUPIVACAINE/NS/PF 2MCG/ML-.1
PLASTIC BAG, INJECTION (ML) INJECTION CONTINUOUS PRN
Status: DISCONTINUED | OUTPATIENT
Start: 2022-07-21 | End: 2022-07-21

## 2022-07-21 RX ORDER — IBUPROFEN 600 MG/1
600 TABLET ORAL EVERY 6 HOURS PRN
Status: DISCONTINUED | OUTPATIENT
Start: 2022-07-21 | End: 2022-07-23 | Stop reason: HOSPADM

## 2022-07-21 RX ORDER — HYDROCORTISONE 25 MG/G
CREAM TOPICAL 3 TIMES DAILY PRN
Status: DISCONTINUED | OUTPATIENT
Start: 2022-07-21 | End: 2022-07-23 | Stop reason: HOSPADM

## 2022-07-21 RX ORDER — HYDROCODONE BITARTRATE AND ACETAMINOPHEN 10; 325 MG/1; MG/1
1 TABLET ORAL EVERY 4 HOURS PRN
Status: DISCONTINUED | OUTPATIENT
Start: 2022-07-21 | End: 2022-07-23 | Stop reason: HOSPADM

## 2022-07-21 RX ORDER — FENTANYL/BUPIVACAINE/NS/PF 2MCG/ML-.1
PLASTIC BAG, INJECTION (ML) INJECTION
Status: COMPLETED
Start: 2022-07-21 | End: 2022-07-21

## 2022-07-21 RX ORDER — OXYTOCIN/RINGER'S LACTATE 30/500 ML
95 PLASTIC BAG, INJECTION (ML) INTRAVENOUS ONCE
Status: DISCONTINUED | OUTPATIENT
Start: 2022-07-21 | End: 2022-07-21

## 2022-07-21 RX ORDER — SIMETHICONE 80 MG
1 TABLET,CHEWABLE ORAL EVERY 6 HOURS PRN
Status: DISCONTINUED | OUTPATIENT
Start: 2022-07-21 | End: 2022-07-23 | Stop reason: HOSPADM

## 2022-07-21 RX ORDER — DIPHENOXYLATE HYDROCHLORIDE AND ATROPINE SULFATE 2.5; .025 MG/1; MG/1
1 TABLET ORAL 4 TIMES DAILY PRN
Status: CANCELLED | OUTPATIENT
Start: 2022-07-21

## 2022-07-21 RX ORDER — SODIUM CHLORIDE 9 MG/ML
INJECTION, SOLUTION INTRAVENOUS
Status: DISCONTINUED | OUTPATIENT
Start: 2022-07-21 | End: 2022-07-21

## 2022-07-21 RX ORDER — ONDANSETRON 8 MG/1
8 TABLET, ORALLY DISINTEGRATING ORAL EVERY 8 HOURS PRN
Status: DISCONTINUED | OUTPATIENT
Start: 2022-07-21 | End: 2022-07-23 | Stop reason: HOSPADM

## 2022-07-21 RX ORDER — LIDOCAINE HYDROCHLORIDE 10 MG/ML
10 INJECTION INFILTRATION; PERINEURAL ONCE AS NEEDED
Status: DISCONTINUED | OUTPATIENT
Start: 2022-07-21 | End: 2022-07-21

## 2022-07-21 RX ORDER — CEFAZOLIN SODIUM 2 G/50ML
2 SOLUTION INTRAVENOUS ONCE
Status: COMPLETED | OUTPATIENT
Start: 2022-07-21 | End: 2022-07-21

## 2022-07-21 RX ORDER — METHYLERGONOVINE MALEATE 0.2 MG/ML
INJECTION INTRAVENOUS
Status: DISCONTINUED
Start: 2022-07-21 | End: 2022-07-21 | Stop reason: WASHOUT

## 2022-07-21 RX ORDER — DOCUSATE SODIUM 100 MG/1
200 CAPSULE, LIQUID FILLED ORAL 2 TIMES DAILY PRN
Status: DISCONTINUED | OUTPATIENT
Start: 2022-07-21 | End: 2022-07-23 | Stop reason: HOSPADM

## 2022-07-21 RX ORDER — TRANEXAMIC ACID 100 MG/ML
1000 INJECTION, SOLUTION INTRAVENOUS ONCE AS NEEDED
Status: DISCONTINUED | OUTPATIENT
Start: 2022-07-21 | End: 2022-07-21

## 2022-07-21 RX ADMIN — DEXTROSE 3 MILLION UNITS: 50 INJECTION, SOLUTION INTRAVENOUS at 11:07

## 2022-07-21 RX ADMIN — MISOPROSTOL 800 MCG: 200 TABLET ORAL at 08:07

## 2022-07-21 RX ADMIN — SODIUM CHLORIDE, SODIUM LACTATE, POTASSIUM CHLORIDE, AND CALCIUM CHLORIDE: 600; 310; 30; 20 INJECTION, SOLUTION INTRAVENOUS at 11:07

## 2022-07-21 RX ADMIN — PROCHLORPERAZINE MALEATE 10 MG: 5 TABLET ORAL at 05:07

## 2022-07-21 RX ADMIN — Medication 2 MILLI-UNITS/MIN: at 11:07

## 2022-07-21 RX ADMIN — MAGNESIUM SULFATE IN WATER 2 G/HR: 40 INJECTION, SOLUTION INTRAVENOUS at 11:07

## 2022-07-21 RX ADMIN — PROCHLORPERAZINE EDISYLATE 5 MG: 5 INJECTION INTRAMUSCULAR; INTRAVENOUS at 04:07

## 2022-07-21 RX ADMIN — FENTANYL CITRATE 100 MCG: 50 INJECTION, SOLUTION INTRAMUSCULAR; INTRAVENOUS at 03:07

## 2022-07-21 RX ADMIN — Medication 10 ML/HR: at 03:07

## 2022-07-21 RX ADMIN — SODIUM CHLORIDE, SODIUM LACTATE, POTASSIUM CHLORIDE, AND CALCIUM CHLORIDE: .6; .31; .03; .02 INJECTION, SOLUTION INTRAVENOUS at 06:07

## 2022-07-21 RX ADMIN — MAGNESIUM SULFATE HEPTAHYDRATE 6 G: 40 INJECTION, SOLUTION INTRAVENOUS at 06:07

## 2022-07-21 RX ADMIN — LIDOCAINE HYDROCHLORIDE,EPINEPHRINE BITARTRATE 3 ML: 15; .005 INJECTION, SOLUTION EPIDURAL; INFILTRATION; INTRACAUDAL; PERINEURAL at 03:07

## 2022-07-21 RX ADMIN — Medication 6 ML: at 03:07

## 2022-07-21 RX ADMIN — MAGNESIUM SULFATE IN WATER 2 G/HR: 40 INJECTION, SOLUTION INTRAVENOUS at 07:07

## 2022-07-21 RX ADMIN — MISOPROSTOL 50 MCG: 100 TABLET ORAL at 07:07

## 2022-07-21 RX ADMIN — CEFAZOLIN SODIUM 2 G: 2 SOLUTION INTRAVENOUS at 10:07

## 2022-07-21 RX ADMIN — DEXTROSE 3 MILLION UNITS: 50 INJECTION, SOLUTION INTRAVENOUS at 03:07

## 2022-07-21 RX ADMIN — DEXTROSE 5 MILLION UNITS: 50 INJECTION, SOLUTION INTRAVENOUS at 06:07

## 2022-07-21 NOTE — ANESTHESIA PREPROCEDURE EVALUATION
[facility]  Anesthesia Pre-Operative Evaluation         Patient Name: Chelsie Green  YOB: 1990  MRN: [id number]    2022      Chelsie Green is a 32 y.o. female  @ 31w2d who presents to the OB ED with RUQ and elevated Bps (not yet gHTN). IUP c/b transaminitis, benign thrombocytopenia. PMHx includes UTI/cysitis, heart murmur, scoliosis, and gHTN in the past. Denies cHTN, asthma, DM, bleeding diathesis, anticoag, prior spinal surgery.      Recent Labs     22  1152 22  1508 22  1359 22  0411 22  0412 22  0507   ALT 63*  --  74* 89*  --  81*   AST 49*  --  59* 61*  --  44*   PLT  --  144* 122*  --  111* 148*       OB History    Para Term  AB Living   2 1 1     1   SAB IAB Ectopic Multiple Live Births         0 1      # Outcome Date GA Lbr Isidoro/2nd Weight Sex Delivery Anes PTL Lv   2 Current            1 Term 19 37w6d / 01:00 3.204 kg (7 lb 1 oz) M Vag-Spont EPI N JOSE       Review of patient's allergies indicates:  No Known Allergies    Wt Readings from Last 1 Encounters:   22 2108 73.2 kg (161 lb 6 oz)       BP Readings from Last 3 Encounters:   22 132/71   22 137/75   22 118/70       Patient Active Problem List   Diagnosis    ASCUS (atypical squamous cells of undetermined significance) on Pap smear    Chronic right-sided low back pain with sciatica    Episcleritis of right eye    High grade squamous intraepithelial lesion (HGSIL), grade 3 OSVALDO, on biopsy of cervix    History of Gestational hypertension    Group B Streptococcus urinary tract infection affecting pregnancy in third trimester    Benign gestational thrombocytopenia in third trimester    31 weeks gestation of pregnancy    Pre-eclampsia with Severe Features       Past Surgical History:   Procedure Laterality Date    breast augmentation      BREAST SURGERY      COSMETIC SURGERY      NOSE SURGERY      correct fracture of nose     TONSILLECTOMY, ADENOIDECTOMY         Social History     Socioeconomic History    Marital status:    Tobacco Use    Smoking status: Never Smoker    Smokeless tobacco: Never Used   Substance and Sexual Activity    Alcohol use: Not Currently     Alcohol/week: 2.0 standard drinks     Types: 2 Glasses of wine per week     Comment: once a week    Drug use: No    Sexual activity: Yes     Partners: Male     Birth control/protection: OCP   Social History Narrative    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 no children         Chemistry        Component Value Date/Time     07/21/2022 0507    K 4.0 07/21/2022 0507     07/21/2022 0507    CO2 21 (L) 07/21/2022 0507    BUN 8 07/21/2022 0507    CREATININE 0.7 07/21/2022 0507     (H) 07/21/2022 0507        Component Value Date/Time    CALCIUM 9.2 07/21/2022 0507    ALKPHOS 86 07/21/2022 0507    AST 44 (H) 07/21/2022 0507    ALT 81 (H) 07/21/2022 0507    BILITOT 0.3 07/21/2022 0507    ESTGFRAFRICA >60 07/21/2022 0507    EGFRNONAA >60 07/21/2022 0507            Lab Results   Component Value Date    WBC 12.62 07/21/2022    HGB 13.5 07/21/2022    HCT 39.1 07/21/2022    MCV 90 07/21/2022     (L) 07/21/2022       Recent Labs     07/19/22  1619 07/21/22  0507   INR 0.9 0.8   APTT 27.8 25.6           Pre-op Assessment    I have reviewed the Patient Summary Reports.     I have reviewed the Nursing Notes. I have reviewed the NPO Status.   I have reviewed the Medications.     Review of Systems  Anesthesia Hx:  No problems with previous Anesthesia  Denies Family Hx of Anesthesia complications.   Denies Personal Hx of Anesthesia complications.   Social:  Non-Smoker    Hematology/Oncology:  Hematology Normal   Oncology Normal     EENT/Dental:EENT/Dental Normal   Cardiovascular:  Cardiovascular Normal     Pulmonary:  Pulmonary Normal    Renal/:  Renal/ Normal     Hepatic/GI:  Hepatic/GI Normal    Musculoskeletal:  Musculoskeletal  Normal    Neurological:  Neurology Normal    Endocrine:  Endocrine Normal    Psych:  Psychiatric Normal           Physical Exam  General: Well nourished, Cooperative and Alert    Airway:  Mallampati: III   Mouth Opening: Normal  TM Distance: Normal  Tongue: Normal  Neck ROM: Normal ROM    Dental:  Intact    Chest/Lungs:  Normal Respiratory Rate    Heart:  Rate: Normal  Rhythm: Regular Rhythm        Anesthesia Plan  Type of Anesthesia, risks & benefits discussed:    Anesthesia Type: Gen ETT, MAC, Epidural, Spinal, CSE  Intra-op Monitoring Plan: Standard ASA Monitors  Post Op Pain Control Plan: multimodal analgesia, IV/PO Opioids PRN, epidural analgesia and intrathecal opioid  Airway Plan: Video, Post-Induction  Informed Consent: Informed consent signed with the Patient and all parties understand the risks and agree with anesthesia plan.  All questions answered.   ASA Score: 3  Day of Surgery Review of History & Physical: H&P Update referred to the surgeon/provider.    Ready For Surgery From Anesthesia Perspective.     .

## 2022-07-21 NOTE — CARE UPDATE
Resident to bedside for routine cervical exam. No complaints at this time     FHT: 125 bpm, mod variability, + accels, - decels; Cat 1 tracing, reactive and reassuring   TOCO: not tracing well on TOCO     SVE: 3/50/-3, ortiz balloon removed     - Will start pitocin per augmentation protocol   - Will continue to closely monitor

## 2022-07-21 NOTE — CARE UPDATE
Patient moved to L&D for induction of labor in the setting of pre-eclampsia with severe features (LFTs) at 31w4d. This AM patient complaining of headache without vision changes. Asymptomatic for other symptoms of pre-eclampsia at this time.     Temp:  [96.4 °F (35.8 °C)-98.5 °F (36.9 °C)] 98.5 °F (36.9 °C)  Pulse:  [71-92] 86  Resp:  [16-18] 16  SpO2:  [92 %-99 %] 93 %  BP: (116-138)/(59-77) 129/66     FHT: 125 bpm, mod variability, + accels, - decels; appropriate for gestational age   TOCO: quiet     Recent Labs   Lab 07/19/22  1359 07/20/22  0411 07/21/22  0507    137 140   K 3.9 4.5 4.0    106 106   CO2 22* 19* 21*   BUN 7 9 8   CREATININE 0.7 0.7 0.7   GLU 68* 111* 111*   PROT 6.8 6.5 7.3   BILITOT 0.6 0.5 0.3   ALKPHOS 73 75 86   ALT 74* 89* 81*   AST 59* 61* 44*      Recent Labs   Lab 07/19/22  1359 07/20/22  0412 07/21/22  0507   WBC 10.92 11.04 12.62   HGB 12.3 12.5 13.5   HCT 35.0* 35.9* 39.1   MCV 89 89 90   * 111* 148*      1. IOL:   - S/p BMZ   - Meredith balloon placed   - Will cytotec for induction agent     2. Pre-E w/ Severe Features:   - VSS   - Magnesium for seizure prophylaxis   - Will continue to closely monitor

## 2022-07-21 NOTE — PROGRESS NOTES
LABOR NOTE    S:  Complaints: No.  Epidural working:  Not applicable.   Resident to bedside for routine cervical exam.     O: /68   Pulse 72   Temp 97.6 °F (36.4 °C) (Oral)   Resp 16   LMP 12/15/2021   SpO2 (!) 94%   Breastfeeding No       FHT: 125 bpm, mod variability, + accels, - decels;  Cat 1 (reassuring)  CTX: irregular, pit at 8 mU/min   SVE: 5/80/-3      ASSESSMENT:   32 y.o.  at 31w4d, IOL 2/2 Pre-E    FHT reassuring    Active Hospital Problems    Diagnosis  POA    Pre-eclampsia with Severe Features [O14.93]  No    Group B Streptococcus urinary tract infection affecting pregnancy in third trimester [O23.43, B95.1]  Unknown    Benign gestational thrombocytopenia in third trimester [O99.113, D69.6]  Unknown    31 weeks gestation of pregnancy [Z3A.31]  Not Applicable    History of Gestational hypertension [O13.3]  Yes      Resolved Hospital Problems    Diagnosis Date Resolved POA    Transaminitis [R74.01] 2022 Yes    Elevated blood pressure [R03.0] 2022 No     TIMELINE:   1100: 3/50/-3, start pitocin   1500: 5/80/-2, pit at 8 mU/min     PLAN:    1. IOL:   - Continue Close Maternal/Fetal Monitoring  - Pitocin Augmentation per protocol  - AROM after epidural   - Recheck 2-4 hours or PRN    2. Pre-Eclampsia:   - Asymptomatic   - BP: (116-138)/(59-77) 130/68  - Continue magnesium for seizure prophylaxis   - Mag checks q 4 hrs     Yvonne Conner MD/MPH  OB/GYN PGY-2  [facility]

## 2022-07-21 NOTE — ANESTHESIA PROCEDURE NOTES
Epidural    Patient location during procedure: OB   Reason for block: primary anesthetic   Reason for block: labor analgesia requested by patient and obstetrician  Diagnosis: IUP   Start time: 7/21/2022 3:31 PM  Timeout: 7/21/2022 3:30 PM  End time: 7/21/2022 3:45 PM  Surgery related to: Vaginal Delivery    Staffing  Performing Provider: Marco Leong MD  Authorizing Provider: Yasemin Car MD        Preanesthetic Checklist  Completed: patient identified, IV checked, site marked, risks and benefits discussed, surgical consent, monitors and equipment checked, pre-op evaluation, timeout performed, anesthesia consent given, hand hygiene performed and patient being monitored  Preparation  Patient position: sitting  Prep: ChloraPrep  Patient monitoring: Pulse Ox  Reason for block: primary anesthetic   Epidural  Skin Anesthetic: lidocaine 1%  Skin Wheal: 3 mL  Administration type: continuous  Approach: midline  Interspace: L3-4    Injection technique: JOVON saline  Needle and Epidural Catheter  Needle type: Tuohy   Needle gauge: 17  Needle length: 3.5 inches  Needle insertion depth: 4.5 cm  Catheter type: [employer]  Catheter size: 19 G  Catheter at skin depth: 9 cm  Insertion Attempts: 1  Test dose: 3 mL of lidocaine 1.5% with Epi 1-to-200,000  Additional Documentation: incremental injection, negative aspiration for heme and CSF, no paresthesia on injection, no signs/symptoms of IV or SA injection, no significant pain on injection and no significant complaints from patient  Needle localization: anatomical landmarks  Medications:  Volume per aspiration: 5 mL  Time between aspirations: 5 minutes   Assessment  Ease of block: easy  Patient's tolerance of the procedure: comfortable throughout block and no complaints No inadvertent dural puncture with Tuohy.  Dural puncture performed with spinal needle.

## 2022-07-21 NOTE — PLAN OF CARE
Problem: Adult Inpatient Plan of Care  Goal: Plan of Care Review  Outcome: Ongoing, Progressing  Goal: Optimal Comfort and Wellbeing  Outcome: Ongoing, Progressing  Goal: Readiness for Transition of Care  Outcome: Ongoing, Progressing     Problem: Maternal-Fetal Wellbeing  Goal: Optimal Maternal-Fetal Wellbeing  Outcome: Ongoing, Progressing

## 2022-07-22 LAB
HCO3 UR-SCNC: 23.1 MMOL/L (ref 24–28)
HCT VFR BLD CALC: 52 %PCV (ref 36–54)
HGB BLD-MCNC: 18 G/DL
PCO2 BLDA: 44.5 MMHG (ref 35–45)
PH SMN: 7.32 [PH] (ref 7.35–7.45)
PO2 BLDA: 34 MMHG (ref 80–100)
POC BE: -3 MMOL/L
POC IONIZED CALCIUM: 1.33 MMOL/L (ref 1.06–1.42)
POC SATURATED O2: 60 % (ref 95–100)
POC TCO2: 24 MMOL/L (ref 23–27)
POTASSIUM BLD-SCNC: 4.2 MMOL/L (ref 3.5–5.1)
SAMPLE: ABNORMAL
SODIUM BLD-SCNC: 133 MMOL/L (ref 136–145)

## 2022-07-22 PROCEDURE — 25000003 PHARM REV CODE 250: Performed by: STUDENT IN AN ORGANIZED HEALTH CARE EDUCATION/TRAINING PROGRAM

## 2022-07-22 PROCEDURE — 88307 TISSUE EXAM BY PATHOLOGIST: CPT | Performed by: PATHOLOGY

## 2022-07-22 PROCEDURE — 88307 PR  SURG PATH,LEVEL V: ICD-10-PCS | Mod: 26,,, | Performed by: PATHOLOGY

## 2022-07-22 PROCEDURE — 11000001 HC ACUTE MED/SURG PRIVATE ROOM

## 2022-07-22 PROCEDURE — 88307 TISSUE EXAM BY PATHOLOGIST: CPT | Mod: 26,,, | Performed by: PATHOLOGY

## 2022-07-22 PROCEDURE — 63600175 PHARM REV CODE 636 W HCPCS

## 2022-07-22 RX ORDER — SODIUM CHLORIDE, SODIUM LACTATE, POTASSIUM CHLORIDE, CALCIUM CHLORIDE 600; 310; 30; 20 MG/100ML; MG/100ML; MG/100ML; MG/100ML
INJECTION, SOLUTION INTRAVENOUS CONTINUOUS
Status: DISCONTINUED | OUTPATIENT
Start: 2022-07-21 | End: 2022-07-22

## 2022-07-22 RX ADMIN — MISOPROSTOL 200 MCG: 200 TABLET ORAL at 03:07

## 2022-07-22 RX ADMIN — PRENATAL VIT W/ FE FUMARATE-FA TAB 27-0.8 MG 1 TABLET: 27-0.8 TAB at 09:07

## 2022-07-22 RX ADMIN — IBUPROFEN 600 MG: 600 TABLET, FILM COATED ORAL at 05:07

## 2022-07-22 RX ADMIN — DOCUSATE SODIUM 200 MG: 100 CAPSULE, LIQUID FILLED ORAL at 05:07

## 2022-07-22 RX ADMIN — SODIUM CHLORIDE, SODIUM LACTATE, POTASSIUM CHLORIDE, AND CALCIUM CHLORIDE: 600; 310; 30; 20 INJECTION, SOLUTION INTRAVENOUS at 11:07

## 2022-07-22 RX ADMIN — MISOPROSTOL 200 MCG: 200 TABLET ORAL at 11:07

## 2022-07-22 RX ADMIN — MISOPROSTOL 200 MCG: 200 TABLET ORAL at 06:07

## 2022-07-22 NOTE — ANESTHESIA POSTPROCEDURE EVALUATION
Anesthesia Post Evaluation    Patient: Chelsie Green    Procedure(s) Performed: * No procedures listed *    Final Anesthesia Type: epidural      Patient location during evaluation: labor & delivery  Patient participation: Yes- Able to Participate  Level of consciousness: awake and alert  Post-procedure vital signs: reviewed and stable  Pain management: adequate  Airway patency: patent  BEE mitigation strategies: Use of major conduction anesthesia (spinal/epidural) or peripheral nerve block and Multimodal analgesia  PONV status at discharge: No PONV  Anesthetic complications: no      Cardiovascular status: hemodynamically stable  Respiratory status: unassisted, spontaneous ventilation and room air  Hydration status: euvolemic            Vitals Value Taken Time   /64 07/22/22 0710   Temp 37.2 °C (99 °F) 07/22/22 0710   Pulse 78 07/22/22 0952   Resp 18 07/22/22 0710   SpO2 99 % 07/22/22 0951   Vitals shown include unvalidated device data.      No case tracking events are documented in the log.      Pain/Bob Score: Pain Rating Prior to Med Admin: 2 (7/22/2022  5:54 AM)

## 2022-07-22 NOTE — NURSING
Pt delivered @ 2004 on 7/2. Order for specimen of placenta to be sent to pathology placed at 0700 on 7/22. Placenta not in specimen refrigerator. Unable to send specimen placenta to pathology. Dr. Costa notified.

## 2022-07-22 NOTE — NURSING
Preparation and Hygiene:  1. Shower daily.  2. Wear a clean bra each day and wash daily in warm soapy water.  3. Change wet or moist breast pads frequently.  Moist pads can promote growth of germs.  Actively wash your hands, paying close attention to the area around and under your fingernails, thoroughly with soap and water for 15 seconds before pumping or handling your milk.  Re-wash your hands if you touch anything (scratching your nose, answering the phone, etc) while pumping or handling your milk.   4. Before pumping your breasts, assemble the pump collection kit and have ready the sterile container and labels.  Place these items on a clean surface next to the breast pump.  5. Each time after you have finished pumping, take apart all of the parts of the breast pump collection kit and place them in a separate cleaning container (do not place them in the sink).  Be sure to remove the yellow valve from the breast shield and separate the white membrane from the yellow valve.  Rinse all of these parts with cool water.  Then use a new sponge and/or bottle brush and dishwashing detergent to clean the parts.  Rinse off the soapy water with cool water and air dry on a clean towel covered with a clean cloth.  All parts may also be washed after each use in the top rack of a .  6. Once each day, sanitize all of the parts of the breast pump collection kit.  This can be done by boiling the kit parts for 10 minutes or by using a Quick Clean Micro-Steam Bag made by [employer]  7. If condensation appears in the tubing, continue to run the pump with the tubing attached for 1-2 minutes or until the tubing is dry.   8. Notify your babys nurse or doctor if you become ill or need to take any medication, even over-the-counter medicines.  Collection and Storage of Expressed Breast milk:         1. Pump your breasts at least 8 or more times every 24 hours.  Double pump (both breasts at the same time) for at least 15-20  minutes using the most suction that is comfortable.    2. Write the date and time of pumping and the name of any medications you are taking on the babys pre-printed hospital identification label.   3.    Do not touch the inside of the storage containers or lids.  4.        Tightly screw the lid onto the container and place immediately into the refrigerator for daily use.  5.    Expressed breast milk should be refrigerated or frozen within 4 hours of pumping.  6.        Do not store expressed breast milk on the door of your refrigerator or freezer             where the temperature is warmer.   7.        Refrigerated milk may be stored for up to 7 days.  At this point it can be moved to the freezer for 6 -12 months.  8.        Thaw frozen breast milk in overnight in the refrigerator.  Once milk is thawed it must be used within 24 hours.  9.        Refrigerated breast milk needs to be warmed to room temperature.  Warm by leaving unrefrigerated until it reached room temperature, or place sealed bottle into a cup of warm (not boiling) water or use a bottle warmer.               Never warm breast milk in a microwave or boiling water.    For any questions or concerns call the Lactation Department at ***

## 2022-07-22 NOTE — PROGRESS NOTES
POSTPARTUM PROGRESS NOTE    Subjective:     PPD/POD#: 1   Procedure:    EGA: 31w4d   N/V: No   F/C: No   Abd Pain: Mild, well-controlled with oral pain medication   Lochia: Mild   Voiding: Yes   Ambulating: Yes   Bowel fnc: Yes   Breastfeeding: No   Contraception: Discuss with primary OB    Circumcision: N/A, female     Objective:      Temp:  [97.5 °F (36.4 °C)-99.8 °F (37.7 °C)] 99 °F (37.2 °C)  Pulse:  [] 73  Resp:  [16-18] 18  SpO2:  [89 %-100 %] 95 %  BP: (100-158)/(55-83) 122/64    Lung: Normal respiratory effort   Abdomen: Soft, appropriately tender   Uterus: Firm, no fundal tenderness   Incision: N/A   : Deferred   Extremities: No edema     Lab Review    Recent Labs   Lab 22  1359 22  0411 22  0507    137 140   K 3.9 4.5 4.0    106 106   CO2 22* 19* 21*   BUN 7 9 8   CREATININE 0.7 0.7 0.7   GLU 68* 111* 111*   PROT 6.8 6.5 7.3   BILITOT 0.6 0.5 0.3   ALKPHOS 73 75 86   ALT 74* 89* 81*   AST 59* 61* 44*       Recent Labs   Lab 22  1359 22  0412 22  0507 22  2045   WBC 10.92 11.04 12.62  --    HGB 12.3 12.5 13.5  --    HCT 35.0* 35.9* 39.1 53   MCV 89 89 90  --    * 111* 148*  --          I/O    Intake/Output Summary (Last 24 hours) at 2022 0942  Last data filed at 2022 0623  Gross per 24 hour   Intake 3415.94 ml   Output 4455 ml   Net -1039.06 ml        Assessment and Plan:   Postpartum care:  - Patient doing well.  - Continue routine management and advances.    PreE w/SF  - BP as above  - asymptomatic  - preE labs as above  - UOP: adequate  - Mag: continue  - Hypertensive agent not indicated at this time    Yvonne Conner MD/MPH  OB/GYN PGY-2  [facility]

## 2022-07-22 NOTE — LACTATION NOTE
This note was copied from a baby's chart.  Lactation Note:   Met father at bedside; Introduced self. He reports mom has initiated pumping,brought a bottle of 15-20ml colostrum in just now! She  their 4y/o as well. Discussed the importance of frequent pumping in first two weeks to establish a full breast milk supply. Encouraged pumping 8 or more times in 24 hours until baby can begin breastfeeding directly, to ensure mom can establish full supply.  Discussed pumping every 2-3 hours with only one 5-hour break without pumping for sleep. Pumping supplies brought to bedside. Mom to visit asap once off Mg.

## 2022-07-22 NOTE — NURSING
[employer] pump, tubing, collections containers and labels brought to bedside.  Discussed proper pump setting of initiation phase.  Instructed on proper usage of pump and to adjust suction according to maximum comfort level.  Verified appropriate flange fit.  Educated on the frequency and duration of pumping in order to promote and maintain a full milk supply.  Hands on pumping technique reviewed.  Encouraged hand expression after pumping.  Instructed on cleaning of breast pump parts.  Written instructions also given.  Pt verbalized understanding and appropriate recall for proper milk handling, collection, labeling, storage and transportation.

## 2022-07-22 NOTE — L&D DELIVERY NOTE
[facility] - Labor & Delivery  Vaginal Delivery   Operative Note    SUMMARY      cephalic after approximately 1 minute of maternal pushing.  Under epidural anesthesia.  Infant delivered mentum anterior over intact perineum.  There was no nuchal.  Infant tolerated delivery well. Cord was clamped and cut & infant handed off to waiting NICU staff for evaluation.   Umbilical arterial and venous blood gas obtained. Venous blood obtained.   Periurethral and posterior vaginal abrasion found to be hemostatic.  Attempted to delivery placenta spontaneously. After several minutes of active management w/ fundal massage and cord traction, repeat exam was performed and uterus was found to be mostly clamped around placenta. Able to assist with evacuation of placenta manually. Full sweep was performed after placental delivery and remaining membranes were evacuated. IV pitocin was given.  Recurrent uterine atony with significant bleeding despite repeated episodes of uterine massage - cytotec 800mcg per rectum administered. Improvement in uterine tone and bleeding noted.  Patient tolerated delivery well.  EBL 1155 cc  Patient tolerated delivery well. Sponge needle and lap counted correctly x2.    Diamond Wall M.D.  OB/GYN PGY-4    Indications:  (normal spontaneous vaginal delivery)  Pregnancy complicated by:   Patient Active Problem List   Diagnosis    ASCUS (atypical squamous cells of undetermined significance) on Pap smear    Chronic right-sided low back pain with sciatica    Episcleritis of right eye    High grade squamous intraepithelial lesion (HGSIL), grade 3 OSVALDO, on biopsy of cervix    History of Gestational hypertension    Benign gestational thrombocytopenia in third trimester    Pre-eclampsia with Severe Features     (normal spontaneous vaginal delivery)    Postpartum hemorrhage     Admitting GA: 31w4d    Delivery Information for Renetta Green    Birth information:  YOB: 2021   Time  of birth: 8:04 PM   Sex: female   Head Delivery Date/Time: 2022  8:04 PM   Delivery type: Vaginal, Spontaneous   Gestational Age: 31w4d    Delivery Providers    Delivering clinician: Julie R. Jeansonne, MD   Provider Role    MD Gabriela Machuca, KAELYN Chan, ST Alex             Measurements    Weight: 1200 g  Weight (lbs): 2 lb 10.3 oz  Length:          Apgars    Living status: Living  Apgars:  1 min.:  5 min.:  10 min.:  15 min.:  20 min.:    Skin color:  1  1       Heart rate:  2  2       Reflex irritability:  2  2       Muscle tone:  2  2       Respiratory effort:  2  2       Total:  9  9       Apgars assigned by: NICU         Operative Delivery    Forceps attempted?: No  Vacuum extractor attempted?: No         Shoulder Dystocia    Shoulder dystocia present?: No           Presentation    Presentation: Vertex  Position: Middle Occiput Posterior           Interventions/Resuscitation    Method: NICU Attended       Cord    Vessels: 3 vessels  Complications: None  Delayed Cord Clamping?: No  Cord Clamped Date/Time: 2022  8:04 PM  Cord Blood Disposition: Sent with Baby  Gases Sent?: Yes  Stem Cell Collection (by MD): No       Placenta    Placenta delivery date/time: 2022  Placenta removal: Manual removal  Placenta appearance: Intact  Placenta disposition: pathology           Labor Events:       labor: Yes     Labor Onset Date/Time:         Dilation Complete Date/Time:         Start Pushing Date/Time: 2022 20:00       Start Pushing Date/Time: 2022 20:00     Rupture Date/Time: 22         Rupture type:          Fluid Amount:       Fluid Color: Clear      Fluid Odor:       Membrane Status: ARM (Artificial Rupture)               steroids: Full Course     Antibiotics given for GBS: Yes     Induction: balloon dilation (Meredith);misoprostol     Indications for induction:  Severe Preeclampsia     Augmentation:  oxytocin     Indications for augmentation: Ineffective Contraction Pattern     Labor complications: None     Additional complications:          Cervical ripening:                     Delivery:      Episiotomy: None     Indication for Episiotomy:       Perineal Lacerations: None Repaired:      Periurethral Laceration:   Repaired:     Labial Laceration:   Repaired:     Sulcus Laceration:   Repaired:     Vaginal Laceration:   Repaired:     Cervical Laceration:   Repaired:     Repair suture: None     Repair # of packets:       Last Value - EBL - Nursing (mL):       Sum - EBL - Nursing (mL): 0     Last Value - EBL - Anesthesia (mL):      Calculated QBL (mL): 1155      Vaginal Sweep Performed: Yes     Surgicount Correct: Yes       Other providers:       Anesthesia    Method: Epidural          Details (if applicable):  Trial of Labor      Categorization:      Priority:     Indications for :     Incision Type:       Additional  information:  Forceps:    Vacuum:    Breech:    Observed anomalies    Other (Comments):

## 2022-07-22 NOTE — PROGRESS NOTES
LABOR NOTE    S:  Complaints: No.  Epidural working:  Not applicable.   Resident to bedside for routine cervical exam.     O: /72   Pulse 94   Temp 98.4 °F (36.9 °C) (Oral)   Resp 16   LMP 12/15/2021   SpO2 100%   Breastfeeding No       FHT: 145 bpm, min variability, - accels, + variable decels after AROM;  Cat 2 overall reassuring  CTX: irregular, pit at 8 mU/min   SVE: 7/90/0, AROM clr      ASSESSMENT:   32 y.o.  at 31w4d, IOL 2/2 Pre-E    FHT reassuring    Active Hospital Problems    Diagnosis  POA    Pre-eclampsia with Severe Features [O14.93]  No    Group B Streptococcus urinary tract infection affecting pregnancy in third trimester [O23.43, B95.1]  Unknown    Benign gestational thrombocytopenia in third trimester [O99.113, D69.6]  Unknown    31 weeks gestation of pregnancy [Z3A.31]  Not Applicable    History of Gestational hypertension [O13.3]  Yes      Resolved Hospital Problems    Diagnosis Date Resolved POA    Transaminitis [R74.01] 2022 Yes    Elevated blood pressure [R03.0] 2022 No     TIMELINE:   1100: 3/50/-3, start pitocin   1500: 5/80/-2, pit at 8 mU/min   1930: 7/90/0, AROM clr    PLAN:    1. IOL:   - Continue Close Maternal/Fetal Monitoring  - Will reposition as patient having repeat variables  - Pitocin Augmentation per protocol  - Recheck in 1 hour    2. Pre-Eclampsia:   - Asymptomatic   -BP: (107-158)/(55-83) 135/72  - Continue magnesium for seizure prophylaxis   - Mag checks q 4 hrs     Diamond Wall M.D.  OB/GYN PGY-4

## 2022-07-23 ENCOUNTER — PATIENT MESSAGE (OUTPATIENT)
Dept: LACTATION | Facility: CLINIC | Age: 32
End: 2022-07-23
Payer: COMMERCIAL

## 2022-07-23 VITALS
BODY MASS INDEX: 26.78 KG/M2 | HEART RATE: 72 BPM | SYSTOLIC BLOOD PRESSURE: 135 MMHG | HEIGHT: 64 IN | OXYGEN SATURATION: 97 % | TEMPERATURE: 98 F | DIASTOLIC BLOOD PRESSURE: 76 MMHG | WEIGHT: 156.88 LBS | RESPIRATION RATE: 1 BRPM

## 2022-07-23 LAB
ALBUMIN SERPL BCP-MCNC: 2.3 G/DL (ref 3.5–5.2)
ALP SERPL-CCNC: 61 U/L (ref 55–135)
ALT SERPL W/O P-5'-P-CCNC: 63 U/L (ref 10–44)
ANION GAP SERPL CALC-SCNC: 6 MMOL/L (ref 8–16)
AST SERPL-CCNC: 45 U/L (ref 10–40)
BASOPHILS # BLD AUTO: 0.01 K/UL (ref 0–0.2)
BASOPHILS NFR BLD: 0.1 % (ref 0–1.9)
BILIRUB SERPL-MCNC: 0.2 MG/DL (ref 0.1–1)
BUN SERPL-MCNC: 9 MG/DL (ref 6–20)
CALCIUM SERPL-MCNC: 7.3 MG/DL (ref 8.7–10.5)
CHLORIDE SERPL-SCNC: 105 MMOL/L (ref 95–110)
CO2 SERPL-SCNC: 28 MMOL/L (ref 23–29)
CREAT SERPL-MCNC: 0.6 MG/DL (ref 0.5–1.4)
DIFFERENTIAL METHOD: ABNORMAL
EOSINOPHIL # BLD AUTO: 0.1 K/UL (ref 0–0.5)
EOSINOPHIL NFR BLD: 0.5 % (ref 0–8)
ERYTHROCYTE [DISTWIDTH] IN BLOOD BY AUTOMATED COUNT: 13.1 % (ref 11.5–14.5)
EST. GFR  (AFRICAN AMERICAN): >60 ML/MIN/1.73 M^2
EST. GFR  (NON AFRICAN AMERICAN): >60 ML/MIN/1.73 M^2
GLUCOSE SERPL-MCNC: 79 MG/DL (ref 70–110)
HCT VFR BLD AUTO: 25.3 % (ref 37–48.5)
HGB BLD-MCNC: 8.7 G/DL (ref 12–16)
IMM GRANULOCYTES # BLD AUTO: 0.07 K/UL (ref 0–0.04)
IMM GRANULOCYTES NFR BLD AUTO: 0.7 % (ref 0–0.5)
LYMPHOCYTES # BLD AUTO: 3.2 K/UL (ref 1–4.8)
LYMPHOCYTES NFR BLD: 33 % (ref 18–48)
MCH RBC QN AUTO: 31.2 PG (ref 27–31)
MCHC RBC AUTO-ENTMCNC: 34.4 G/DL (ref 32–36)
MCV RBC AUTO: 91 FL (ref 82–98)
MONOCYTES # BLD AUTO: 0.7 K/UL (ref 0.3–1)
MONOCYTES NFR BLD: 7.1 % (ref 4–15)
NEUTROPHILS # BLD AUTO: 5.7 K/UL (ref 1.8–7.7)
NEUTROPHILS NFR BLD: 58.6 % (ref 38–73)
NRBC BLD-RTO: 0 /100 WBC
PLATELET # BLD AUTO: 96 K/UL (ref 150–450)
PMV BLD AUTO: 10.4 FL (ref 9.2–12.9)
POTASSIUM SERPL-SCNC: 3.9 MMOL/L (ref 3.5–5.1)
PROT SERPL-MCNC: 5.5 G/DL (ref 6–8.4)
RBC # BLD AUTO: 2.79 M/UL (ref 4–5.4)
SODIUM SERPL-SCNC: 139 MMOL/L (ref 136–145)
WBC # BLD AUTO: 9.66 K/UL (ref 3.9–12.7)

## 2022-07-23 PROCEDURE — 25000003 PHARM REV CODE 250: Performed by: STUDENT IN AN ORGANIZED HEALTH CARE EDUCATION/TRAINING PROGRAM

## 2022-07-23 PROCEDURE — 80053 COMPREHEN METABOLIC PANEL: CPT | Performed by: STUDENT IN AN ORGANIZED HEALTH CARE EDUCATION/TRAINING PROGRAM

## 2022-07-23 PROCEDURE — 85025 COMPLETE CBC W/AUTO DIFF WBC: CPT | Performed by: STUDENT IN AN ORGANIZED HEALTH CARE EDUCATION/TRAINING PROGRAM

## 2022-07-23 PROCEDURE — 36415 COLL VENOUS BLD VENIPUNCTURE: CPT | Performed by: STUDENT IN AN ORGANIZED HEALTH CARE EDUCATION/TRAINING PROGRAM

## 2022-07-23 RX ORDER — IBUPROFEN 600 MG/1
600 TABLET ORAL EVERY 6 HOURS PRN
Qty: 30 TABLET | Refills: 1 | Status: SHIPPED | OUTPATIENT
Start: 2022-07-23 | End: 2023-11-08

## 2022-07-23 RX ORDER — HYDROCODONE BITARTRATE AND ACETAMINOPHEN 5; 325 MG/1; MG/1
1 TABLET ORAL EVERY 6 HOURS PRN
Qty: 12 TABLET | Refills: 0 | Status: SHIPPED | OUTPATIENT
Start: 2022-07-23 | End: 2022-08-02

## 2022-07-23 RX ORDER — FERROUS SULFATE 325(65) MG
325 TABLET, DELAYED RELEASE (ENTERIC COATED) ORAL DAILY
Qty: 60 TABLET | Refills: 0 | Status: SHIPPED | OUTPATIENT
Start: 2022-07-23 | End: 2023-11-08

## 2022-07-23 RX ORDER — AMOXICILLIN 250 MG
1 CAPSULE ORAL DAILY
Qty: 60 TABLET | Refills: 0 | Status: SHIPPED | OUTPATIENT
Start: 2022-07-23 | End: 2023-11-08

## 2022-07-23 RX ADMIN — PRENATAL VIT W/ FE FUMARATE-FA TAB 27-0.8 MG 1 TABLET: 27-0.8 TAB at 10:07

## 2022-07-23 RX ADMIN — DOCUSATE SODIUM 200 MG: 100 CAPSULE, LIQUID FILLED ORAL at 10:07

## 2022-07-23 NOTE — LACTATION NOTE
LC rounds. Pt reports pumping frequently in last 24 hours, ~ 6 session, obtaining 35-45mL using maintenance mode. Support and encouragement provided. Reinforced importance of frequent pumping especially in first 6 weeks.     LC did DC teaching for NICU mother pumping for her baby. Pt has NICU Mothers Lactation Booklet for lactation. Reviewed how to work pump, keep track of pumpings, label breastmilk, clean pump parts and safely store and transport milk. Pt aware to pump 8 or more times a day for 15-20 minutes. Pt is using a Ameda Lashon pump at home and is aware that she can use the [employer] breastpump at the baby's bedside when she visits. Mother has lactation phone number to call for further questions. Pt verbalized understanding and questions answered.

## 2022-07-23 NOTE — PROGRESS NOTES
RN spoke w/Dr. Mascorro and pt was cleared to visit the NICU. Pt is s/p magnesium as of today @ 2004. Pt vs stable.

## 2022-07-23 NOTE — DISCHARGE INSTRUCTIONS
Notify your health care provider if you experience any of the following:    - temperature >100.4  - persistent nausea and vomiting or diarrhea    - severe uncontrolled pain   - redness, tenderness, or signs of infection (pain, swelling, redness, odor or green/yellow discharge around incision site)   - difficulty breathing or increased cough  - severe persistent headache  - worsening rash   - persistent dizziness, light-headedness, or visual disturbances  - increased confusion or weakness

## 2022-07-23 NOTE — DISCHARGE SUMMARY
Delivery Discharge Summary  Obstetrics      Primary OB Clinician: Shannon Barrios MD      Admission date: 2022  Discharge date: 2022    Disposition: To home, self care    Discharge Diagnosis List:      Patient Active Problem List   Diagnosis    ASCUS (atypical squamous cells of undetermined significance) on Pap smear    Chronic right-sided low back pain with sciatica    Episcleritis of right eye    High grade squamous intraepithelial lesion (HGSIL), grade 3 OSVALDO, on biopsy of cervix    History of Gestational hypertension    Benign gestational thrombocytopenia in third trimester    Pre-eclampsia with Severe Features     (normal spontaneous vaginal delivery)    Postpartum hemorrhage       Procedure:     Hospital Course:  Chelsie Green is a 32 y.o. now , PPD #2 who was admitted to [facility] service on 2022 at 31w2d for elevated BPs and transamnitis/RUQ pain. Patient was subsequently diagnosed with PreE w/ SF and IOL was initiated.    Labor course was uncomplicated and resulted in , which was complicated by postpartum hemorrhage (QBL 1155mL).     Please see delivery note for further details. Her postpartum course was uncomplicated. She received 24h of MgSO4 postpartum. On discharge day, patient's pain is controlled with oral pain medications. Pt is tolerating ambulation without SOB or CP, and regular diet without N/V. Reports lochia is mild. Denies any HA, vision changes, F/C, LE swelling. Denies any breast pain/soreness.    Pt in stable condition and ready for discharge. She has been instructed to start and/or continue medications and follow up with her obstetrics provider as listed below.    Pertinent studies:  CBC  Recent Labs   Lab 22  0412 22  0507 22  2045 22  2047 22  0444   WBC 11.04 12.62  --   --  9.66   HGB 12.5 13.5  --   --  8.7*   HCT 35.9* 39.1 53 52 25.3*   MCV 89 90  --   --  91   * 148*  --   --  96*          Immunization  History   Administered Date(s) Administered    COVID-19, MRNA, LN-S, PF (Pfizer) (Purple Cap) 12/16/2020, 01/06/2021, 09/27/2021    DTaP 1991, 1991, 03/26/1992, 06/24/1992, 01/28/1993, 02/23/1995    HIB 1991, 1991, 03/26/1992, 06/24/1992, 01/28/1993    HPV Quadrivalent 02/12/2007, 05/03/2007, 08/14/2007    Hepatitis A, Pediatric/Adolescent, 2 Dose 09/11/2005    Hepatitis B 11/04/1994    Hepatitis B, Adult 1991, 08/01/2013, 09/11/2013, 01/31/2014    Hepatitis B, Pediatric/Adolescent 04/08/1993, 10/07/1994, 05/11/1995    IPV 1991, 1991, 01/28/1993    Influenza 10/16/2013    Influenza - Quadrivalent - PF *Preferred* (6 months and older) 10/29/2003, 01/13/2006, 11/21/2007, 12/23/2008, 02/27/2012, 10/10/2014, 09/09/2016, 09/16/2017, 10/22/2018, 10/02/2019, 10/09/2020    MMR 10/07/1994, 02/23/1995    Meningococcal Conjugate (MCV4O) 06/24/2013    Meningococcal Conjugate (MCV4P) 01/13/2006, 04/04/2016    OPV 02/23/1995    PPD Test 05/18/2012, 05/21/2012, 05/03/2013, 03/28/2014    Td (Adult), Unspecified Formulation 10/29/2003, 09/11/2005    Tdap 12/23/2008, 12/05/2018, 09/24/2019, 06/23/2022    Tetanus 12/23/2008        Delivery:    Episiotomy: None   Lacerations: None   Repair suture: None   Repair # of packets:     Blood loss (ml):       Birth information:  YOB: 2021   Time of birth: 8:04 PM   Sex: female   Delivery type: Vaginal, Spontaneous   Gestational Age: 31w4d    Delivery Clinician:      Other providers:       Additional  information:  Forceps:    Vacuum:    Breech:    Observed anomalies      Living?:           APGARS  One minute Five minutes Ten minutes   Skin color:         Heart rate:         Grimace:         Muscle tone:         Breathing:         Totals: 9  9        Placenta: Delivered:       appearance      Patient Instructions:   Current Discharge Medication List      START taking these medications    Details   ferrous sulfate 325  (65 FE) MG EC tablet Take 1 tablet (325 mg total) by mouth once daily.  Qty: 60 tablet, Refills: 0      HYDROcodone-acetaminophen (NORCO) 5-325 mg per tablet Take 1 tablet by mouth every 6 (six) hours as needed for Pain.  Qty: 12 tablet, Refills: 0      ibuprofen (ADVIL,MOTRIN) 600 MG tablet Take 1 tablet (600 mg total) by mouth every 6 (six) hours as needed.  Qty: 30 tablet, Refills: 1      senna-docusate 8.6-50 mg (SENNA WITH DOCUSATE SODIUM) 8.6-50 mg per tablet Take 1 tablet by mouth once daily.  Qty: 60 tablet, Refills: 0         CONTINUE these medications which have NOT CHANGED    Details   ondansetron (ZOFRAN-ODT) 4 MG TbDL Dissolve 1 tablet (4 mg total) by mouth every 6 (six) hours as needed (nausea).  Qty: 30 tablet, Refills: 0    Associated Diagnoses: Nausea and vomiting in pregnancy      oxyCODONE-acetaminophen (PERCOCET) 5-325 mg per tablet Take 1 tablet by mouth every 4 (four) hours as needed for Pain.  Qty: 15 tablet, Refills: 0    Comments: Quantity prescribed more than 7 day supply? No      prenatal 105-iron-folic ac-dha 30 mg iron- 1.4 mg-300 mg Cmpk Take by mouth.      prochlorperazine (COMPAZINE) 5 MG tablet Take 1 tablet (5 mg total) by mouth every 6 (six) hours as needed for Nausea.  Qty: 30 tablet, Refills: 2    Associated Diagnoses: Pregnancy headache in second trimester      promethazine (PHENERGAN) 25 MG tablet Take 1 tablet (25 mg total) by mouth every 4 (four) hours.  Qty: 30 tablet, Refills: 2             Discharge Procedure Orders   Diet Adult Regular     No driving until:   Order Comments: Able to safely slam on the breaks without pain and not taking narcotic pain medications     Pelvic Rest   Order Comments: Pelvic rest (nothing in the vagina) for 4-6 weeks.     Notify your health care provider if you experience any of the following:  temperature >100.4     Notify your health care provider if you experience any of the following:  persistent nausea and vomiting or diarrhea     Notify  your health care provider if you experience any of the following:  severe uncontrolled pain     Notify your health care provider if you experience any of the following:  redness, tenderness, or signs of infection (pain, swelling, redness, odor or green/yellow discharge around incision site)     Notify your health care provider if you experience any of the following:  difficulty breathing or increased cough     Notify your health care provider if you experience any of the following:  severe persistent headache     Notify your health care provider if you experience any of the following:  worsening rash     Notify your health care provider if you experience any of the following:  persistent dizziness, light-headedness, or visual disturbances     Notify your health care provider if you experience any of the following:  increased confusion or weakness     Notify your health care provider if you experience any of the following:   Order Comments: Heavy vaginal bleeding saturating more than 1 pad per hour for at least 2 hours.     Activity as tolerated        Follow-up Information     Shannon Barrios MD Follow up in 1 week(s).    Specialty: Obstetrics and Gynecology  Why: For blood pressure check  Contact information:  [address] [id number] [phone]             Shannon Barrios MD Follow up in 6 week(s).    Specialty: Obstetrics and Gynecology  Why: Postpartum visit  Contact information:  [address] [id number] [phone]                          Oswaldo Kapadia M.D.  OB/GYN PGY-3

## 2022-07-23 NOTE — PROGRESS NOTES
Pt slept throughout the night. Pt woke up occasionally to pump. Pt now s/p mag and her Cytotec series. Uterus is firm and midline. Pt able to void without difficulty. Pt has no questions at this time. Safety maintained and call light is within reach.

## 2022-07-25 ENCOUNTER — LAB VISIT (OUTPATIENT)
Dept: LAB | Facility: OTHER | Age: 32
End: 2022-07-25
Attending: OBSTETRICS & GYNECOLOGY
Payer: COMMERCIAL

## 2022-07-25 ENCOUNTER — PATIENT MESSAGE (OUTPATIENT)
Dept: OBSTETRICS AND GYNECOLOGY | Facility: CLINIC | Age: 32
End: 2022-07-25
Payer: COMMERCIAL

## 2022-07-25 DIAGNOSIS — O14.20 HEMOLYSIS, ELEVATED LIVER ENZYMES, AND LOW PLATELET (HELLP) SYNDROME DURING PREGNANCY, ANTEPARTUM: ICD-10-CM

## 2022-07-25 DIAGNOSIS — O14.20 HEMOLYSIS, ELEVATED LIVER ENZYMES, AND LOW PLATELET (HELLP) SYNDROME DURING PREGNANCY, ANTEPARTUM: Primary | ICD-10-CM

## 2022-07-25 LAB
ALBUMIN SERPL BCP-MCNC: 3.1 G/DL (ref 3.5–5.2)
ALP SERPL-CCNC: 72 U/L (ref 55–135)
ALT SERPL W/O P-5'-P-CCNC: 55 U/L (ref 10–44)
ANION GAP SERPL CALC-SCNC: 12 MMOL/L (ref 8–16)
AST SERPL-CCNC: 31 U/L (ref 10–40)
BASOPHILS # BLD AUTO: 0.04 K/UL (ref 0–0.2)
BASOPHILS NFR BLD: 0.3 % (ref 0–1.9)
BILIRUB SERPL-MCNC: 0.4 MG/DL (ref 0.1–1)
BUN SERPL-MCNC: 9 MG/DL (ref 6–20)
CALCIUM SERPL-MCNC: 9.6 MG/DL (ref 8.7–10.5)
CHLORIDE SERPL-SCNC: 104 MMOL/L (ref 95–110)
CO2 SERPL-SCNC: 25 MMOL/L (ref 23–29)
CREAT SERPL-MCNC: 0.7 MG/DL (ref 0.5–1.4)
DIFFERENTIAL METHOD: ABNORMAL
EOSINOPHIL # BLD AUTO: 0.3 K/UL (ref 0–0.5)
EOSINOPHIL NFR BLD: 2.2 % (ref 0–8)
ERYTHROCYTE [DISTWIDTH] IN BLOOD BY AUTOMATED COUNT: 13.7 % (ref 11.5–14.5)
EST. GFR  (AFRICAN AMERICAN): >60 ML/MIN/1.73 M^2
EST. GFR  (NON AFRICAN AMERICAN): >60 ML/MIN/1.73 M^2
GLUCOSE SERPL-MCNC: 101 MG/DL (ref 70–110)
HCT VFR BLD AUTO: 30.9 % (ref 37–48.5)
HGB BLD-MCNC: 10.6 G/DL (ref 12–16)
IMM GRANULOCYTES # BLD AUTO: 0.14 K/UL (ref 0–0.04)
IMM GRANULOCYTES NFR BLD AUTO: 1.2 % (ref 0–0.5)
LYMPHOCYTES # BLD AUTO: 3.6 K/UL (ref 1–4.8)
LYMPHOCYTES NFR BLD: 29.8 % (ref 18–48)
MCH RBC QN AUTO: 31.3 PG (ref 27–31)
MCHC RBC AUTO-ENTMCNC: 34.3 G/DL (ref 32–36)
MCV RBC AUTO: 91 FL (ref 82–98)
MONOCYTES # BLD AUTO: 0.6 K/UL (ref 0.3–1)
MONOCYTES NFR BLD: 4.9 % (ref 4–15)
NEUTROPHILS # BLD AUTO: 7.5 K/UL (ref 1.8–7.7)
NEUTROPHILS NFR BLD: 61.6 % (ref 38–73)
NRBC BLD-RTO: 0 /100 WBC
PLATELET # BLD AUTO: 221 K/UL (ref 150–450)
PMV BLD AUTO: 10.3 FL (ref 9.2–12.9)
POTASSIUM SERPL-SCNC: 3.8 MMOL/L (ref 3.5–5.1)
PROT SERPL-MCNC: 7.1 G/DL (ref 6–8.4)
RBC # BLD AUTO: 3.39 M/UL (ref 4–5.4)
SODIUM SERPL-SCNC: 141 MMOL/L (ref 136–145)
WBC # BLD AUTO: 12.11 K/UL (ref 3.9–12.7)

## 2022-07-25 PROCEDURE — 80053 COMPREHEN METABOLIC PANEL: CPT | Performed by: OBSTETRICS & GYNECOLOGY

## 2022-07-25 PROCEDURE — 36415 COLL VENOUS BLD VENIPUNCTURE: CPT | Performed by: OBSTETRICS & GYNECOLOGY

## 2022-07-25 PROCEDURE — 85025 COMPLETE CBC W/AUTO DIFF WBC: CPT | Performed by: OBSTETRICS & GYNECOLOGY

## 2022-07-25 NOTE — TELEPHONE ENCOUNTER
Spoke to patient for 6 week postpartum. Does patient need to repeat labs for platelets and LFT's at any point? Also do you recommend checking BP on connected MOM or BP check in office?

## 2022-07-25 NOTE — TELEPHONE ENCOUNTER
Spoke with patient.  Pt is doing well and asymptomatic at this time.  Will plan to get CBC and CMP to look at platelets, as well as make sure LFTs are trending downwards.  Pt to take BP daily with connected mom.  Will follow up BP readings.     Shannon Barrios MD  Obstetrics & Gynecology

## 2022-07-29 LAB
FINAL PATHOLOGIC DIAGNOSIS: NORMAL
Lab: NORMAL

## 2022-08-07 ENCOUNTER — PATIENT MESSAGE (OUTPATIENT)
Dept: OBSTETRICS AND GYNECOLOGY | Facility: CLINIC | Age: 32
End: 2022-08-07
Payer: COMMERCIAL

## 2022-08-08 ENCOUNTER — TELEPHONE (OUTPATIENT)
Dept: OBSTETRICS AND GYNECOLOGY | Facility: CLINIC | Age: 32
End: 2022-08-08
Payer: COMMERCIAL

## 2022-08-08 ENCOUNTER — PATIENT MESSAGE (OUTPATIENT)
Dept: INTERNAL MEDICINE | Facility: CLINIC | Age: 32
End: 2022-08-08
Payer: COMMERCIAL

## 2022-08-08 DIAGNOSIS — N61.0 MASTITIS: Primary | ICD-10-CM

## 2022-08-08 RX ORDER — DICLOXACILLIN SODIUM 250 MG/1
500 CAPSULE ORAL 4 TIMES DAILY
Qty: 80 CAPSULE | Refills: 0 | Status: SHIPPED | OUTPATIENT
Start: 2022-08-08 | End: 2022-08-18 | Stop reason: SDUPTHER

## 2022-08-18 ENCOUNTER — POSTPARTUM VISIT (OUTPATIENT)
Dept: OBSTETRICS AND GYNECOLOGY | Facility: CLINIC | Age: 32
End: 2022-08-18
Payer: COMMERCIAL

## 2022-08-18 ENCOUNTER — PATIENT MESSAGE (OUTPATIENT)
Dept: OBSTETRICS AND GYNECOLOGY | Facility: CLINIC | Age: 32
End: 2022-08-18

## 2022-08-18 ENCOUNTER — HOSPITAL ENCOUNTER (OUTPATIENT)
Dept: RADIOLOGY | Facility: OTHER | Age: 32
Discharge: HOME | End: 2022-08-18
Attending: REGISTERED NURSE
Payer: COMMERCIAL

## 2022-08-18 VITALS
DIASTOLIC BLOOD PRESSURE: 64 MMHG | BODY MASS INDEX: 24.84 KG/M2 | WEIGHT: 145.5 LBS | SYSTOLIC BLOOD PRESSURE: 108 MMHG | HEIGHT: 64 IN

## 2022-08-18 DIAGNOSIS — N61.0 MASTITIS: Primary | ICD-10-CM

## 2022-08-18 DIAGNOSIS — N61.0 MASTITIS: ICD-10-CM

## 2022-08-18 PROCEDURE — 99999 PR PBB SHADOW E&M-EST. PATIENT-LVL III: CPT | Mod: PBBFAC,,, | Performed by: REGISTERED NURSE

## 2022-08-18 PROCEDURE — 99213 PR OFFICE/OUTPT VISIT, EST, LEVL III, 20-29 MIN: ICD-10-PCS | Mod: S$GLB,,, | Performed by: REGISTERED NURSE

## 2022-08-18 PROCEDURE — 76642 ULTRASOUND BREAST LIMITED: CPT | Mod: 26,LT,, | Performed by: RADIOLOGY

## 2022-08-18 PROCEDURE — 1111F DSCHRG MED/CURRENT MED MERGE: CPT | Mod: CPTII,S$GLB,, | Performed by: REGISTERED NURSE

## 2022-08-18 PROCEDURE — 99999 PR PBB SHADOW E&M-EST. PATIENT-LVL III: ICD-10-PCS | Mod: PBBFAC,,, | Performed by: REGISTERED NURSE

## 2022-08-18 PROCEDURE — 76642 US BREAST LEFT LIMITED: ICD-10-PCS | Mod: 26,LT,, | Performed by: RADIOLOGY

## 2022-08-18 PROCEDURE — 76642 ULTRASOUND BREAST LIMITED: CPT | Mod: TC,LT

## 2022-08-18 PROCEDURE — 99213 OFFICE O/P EST LOW 20 MIN: CPT | Mod: S$GLB,,, | Performed by: REGISTERED NURSE

## 2022-08-18 PROCEDURE — 1111F PR DISCHARGE MEDS RECONCILED W/ CURRENT OUTPATIENT MED LIST: ICD-10-PCS | Mod: CPTII,S$GLB,, | Performed by: REGISTERED NURSE

## 2022-08-18 RX ORDER — CEPHALEXIN 500 MG/1
500 CAPSULE ORAL 4 TIMES DAILY
Qty: 40 CAPSULE | Refills: 0 | Status: SHIPPED | OUTPATIENT
Start: 2022-08-18 | End: 2022-08-28

## 2022-08-18 NOTE — PROGRESS NOTES
Chelsie Green  complains of left breast pain that began 13 days ago.  She began antibioitcs on  (3 days later) and is on her last day of antibiotics today. The area of concern (to her left axilla, which she reports was red and warm to the touch) has resolved, but area to inner left breast is now red and warm. She also reports area to inner right nipple is starting to feel irritated. She complains of fever prior to starting Dicloxicillin and is unsure of fever currently due to taking Ibuprofen and Tylenol around the clock.  She is exclusively pumping for her baby in the NICU.  She denies breast mass.        ROS:  GENERAL: No fever, chills, fatigability or weight loss.  VULVAR: No pain, no lesions and no itching.  VAGINAL: No relaxation, no itching, no discharge, no abnormal bleeding and no lesions.  ABDOMEN: No abdominal pain. Denies nausea. Denies vomiting. No diarrhea. No constipation  BREAST: Denies pain. No lumps. No discharge.  URINARY: No incontinence, no nocturia, no frequency and no dysuria.  CARDIOVASCULAR: No chest pain. No shortness of breath. No leg cramps.  NEUROLOGICAL: no headaches. No vision changes.    APPEARANCE: Well nourished, well developed, White female in no acute distress.  NODES: no cervical, supraclavicular, or inguinal lymphadenopathy  BREASTS: Symmetrical, no skin changes or visible lesions. No palpable masses, nipple discharge or adenopathy bilaterally.  + red streaks and erythema to left breast- inner quadrant. + tenderness to palpation of  left breast. No abscess  ABDOMEN: Soft. No tenderness or masses. No distention. No hernias palpated. No CVA tenderness.  PELVIS: Deferred      Chelsie was seen today for breast pain.    Diagnoses and all orders for this visit:    Mastitis  -     Cancel: US Breast Left Complete; Future  -     cephALEXin (KEFLEX) 500 MG capsule; Take 1 capsule (500 mg total) by mouth 4 (four) times daily. for 10 days  -     US Breast Left Limited;  Future      Stat US of left breast  Keflex 500 mg QID X 10 days  Discussed warm compresses/ warm shower before nursing.  Discussed increase pumping time from 15 to 20 minutes per pumping session to ensure full emptying of breasts.  Increased rest and increase PO hydration  Tylenol PRN for fever        ROSALBA Trent

## 2022-09-06 ENCOUNTER — POSTPARTUM VISIT (OUTPATIENT)
Dept: OBSTETRICS AND GYNECOLOGY | Facility: CLINIC | Age: 32
End: 2022-09-06
Payer: COMMERCIAL

## 2022-09-06 VITALS
HEIGHT: 64 IN | BODY MASS INDEX: 24.28 KG/M2 | SYSTOLIC BLOOD PRESSURE: 108 MMHG | DIASTOLIC BLOOD PRESSURE: 58 MMHG | WEIGHT: 142.19 LBS

## 2022-09-06 DIAGNOSIS — O14.93 PRE-ECLAMPSIA IN THIRD TRIMESTER: ICD-10-CM

## 2022-09-06 DIAGNOSIS — D06.9 HIGH GRADE SQUAMOUS INTRAEPITHELIAL LESION (HGSIL), GRADE 3 CIN, ON BIOPSY OF CERVIX: ICD-10-CM

## 2022-09-06 DIAGNOSIS — Z30.8 ENCOUNTER FOR OTHER CONTRACEPTIVE MANAGEMENT: ICD-10-CM

## 2022-09-06 PROCEDURE — 0503F POSTPARTUM CARE VISIT: CPT | Mod: CPTII,S$GLB,, | Performed by: OBSTETRICS & GYNECOLOGY

## 2022-09-06 PROCEDURE — 0503F PR POSTPARTUM CARE VISIT: ICD-10-PCS | Mod: CPTII,S$GLB,, | Performed by: OBSTETRICS & GYNECOLOGY

## 2022-09-06 PROCEDURE — 87624 HPV HI-RISK TYP POOLED RSLT: CPT | Performed by: OBSTETRICS & GYNECOLOGY

## 2022-09-06 PROCEDURE — 88175 CYTOPATH C/V AUTO FLUID REDO: CPT | Performed by: OBSTETRICS & GYNECOLOGY

## 2022-09-06 PROCEDURE — 99999 PR PBB SHADOW E&M-EST. PATIENT-LVL III: ICD-10-PCS | Mod: PBBFAC,,, | Performed by: OBSTETRICS & GYNECOLOGY

## 2022-09-06 PROCEDURE — 87625 HPV TYPES 16 & 18 ONLY: CPT | Mod: 59 | Performed by: OBSTETRICS & GYNECOLOGY

## 2022-09-06 PROCEDURE — 99999 PR PBB SHADOW E&M-EST. PATIENT-LVL III: CPT | Mod: PBBFAC,,, | Performed by: OBSTETRICS & GYNECOLOGY

## 2022-09-06 NOTE — PROGRESS NOTES
"CC: Post-partum follow-up    Chelsie Green is a 32 y.o. female  presents for post-partum visit s/p a MADHURI.    Delivery Date: 2022  Delivery MD: Julie Jeansonne, MD  Gender: female  Birth Weight: 2 pounds 10 ounces  Breast Feeding: YES  Depression: NO  Contraception: phexxi, vasectomy    Pregnancy was complicated by:  HELLP/ Pre-e with severe features requiring PTD at 31 weeks, cervical dysplasia      BP (!) 108/58   Ht 5' 4" (1.626 m)   Wt 64.5 kg (142 lb 3.2 oz)   LMP 2022 (Exact Date)   BMI 24.41 kg/m²     ROS:  GENERAL: No fever, chills, fatigability or weight loss.  VULVAR: No pain, no lesions and no itching.  VAGINAL: No relaxation, no itching, no discharge, no abnormal bleeding and no lesions.  ABDOMEN: No abdominal pain. Denies nausea. Denies vomiting. No diarrhea. No constipation  BREAST: Denies pain. No lumps. No discharge.  URINARY: No incontinence, no nocturia, no frequency and no dysuria.  CARDIOVASCULAR: No chest pain. No shortness of breath. No leg cramps.  NEUROLOGICAL: No headaches. No vision changes.    PHYSICAL EXAM:  PELVIC: Perineum well healed.  No signs of dehiscence, infection or bleeding.  Non-tender,. No cervical lesions or masses.     PROCEDURES:  - Pap smear        Diagnosis:  1.  (normal spontaneous vaginal delivery)    2. Pre-eclampsia with Severe Features    3. High grade squamous intraepithelial lesion (HGSIL), grade 3 OSVALDO, on biopsy of cervix    4. Encounter for other contraceptive management        Plan:     Orders Placed This Encounter    HPV High Risk Genotypes, PCR    Liquid-Based Pap Smear, Screening    lactic acid-citric-potassium 1.8-1-0.4 % Gel     Chelsie was seen today for postpartum care.    Diagnoses and all orders for this visit:     (normal spontaneous vaginal delivery)  - Doing well postpartum.  No restrictions moving forward.  - No signs of PP depression  - Breastfeeding      Pre-eclampsia with Severe Features  - BP normal " today.  Not on any medications  - No need for medication at this time  - Asymptomatic    High grade squamous intraepithelial lesion (HGSIL), grade 3 OSVALDO, on biopsy of cervix  - Cotesting today with plans for colpo if needed  -     Liquid-Based Pap Smear, Screening  -     HPV High Risk Genotypes, PCR    Encounter for other contraceptive management  - Plan for  to get vasectomy.  Will start on phexxi  -     lactic acid-citric-potassium 1.8-1-0.4 % Gel; Place 1 applicator vaginally as needed.        Orders Placed This Encounter   Procedures    HPV High Risk Genotypes, PCR       Follow up based on results.    Shannon Barrios MD  Obstetrics & Gynecology

## 2022-09-07 ENCOUNTER — TELEPHONE (OUTPATIENT)
Dept: PHARMACY | Facility: CLINIC | Age: 32
End: 2022-09-07
Payer: COMMERCIAL

## 2022-09-14 ENCOUNTER — PATIENT MESSAGE (OUTPATIENT)
Dept: OBSTETRICS AND GYNECOLOGY | Facility: CLINIC | Age: 32
End: 2022-09-14
Payer: COMMERCIAL

## 2022-09-14 LAB
CLINICAL INFO: ABNORMAL
CYTO CVX: ABNORMAL
CYTOLOGIST CVX/VAG CYTO: ABNORMAL
CYTOLOGIST CVX/VAG CYTO: ABNORMAL
CYTOLOGY CMNT CVX/VAG CYTO-IMP: ABNORMAL
CYTOLOGY PAP THIN PREP EXPLANATION: ABNORMAL
DATE OF PREVIOUS PAP: ABNORMAL
DATE PREVIOUS BX: YES
GEN CATEG CVX/VAG CYTO-IMP: ABNORMAL
HPV I/H RISK 4 DNA CVX QL NAA+PROBE: DETECTED
HPV16 DNA CVX QL PROBE+SIG AMP: DETECTED
HPV18 DNA CVX QL PROBE+SIG AMP: NOT DETECTED
LMP START DATE: ABNORMAL
MICROORGANISM CVX/VAG CYTO: ABNORMAL
PATHOLOGIST CVX/VAG CYTO: ABNORMAL
SERVICE CMNT-IMP: ABNORMAL
SPECIMEN SOURCE CVX/VAG CYTO: ABNORMAL
STAT OF ADQ CVX/VAG CYTO-IMP: ABNORMAL

## 2022-09-20 ENCOUNTER — PATIENT MESSAGE (OUTPATIENT)
Dept: OBSTETRICS AND GYNECOLOGY | Facility: CLINIC | Age: 32
End: 2022-09-20
Payer: COMMERCIAL

## 2022-09-21 ENCOUNTER — TELEPHONE (OUTPATIENT)
Dept: PHARMACY | Facility: CLINIC | Age: 32
End: 2022-09-21
Payer: COMMERCIAL

## 2022-09-21 NOTE — TELEPHONE ENCOUNTER
Spoke to patient and advised that irregular bleeding can occur when fluctuating the frequency of breastfeeding. Advised bleeding is likely happening since she is weaning. Given bleeding precautions.

## 2022-10-24 PROBLEM — O13.3 GESTATIONAL HYPERTENSION, THIRD TRIMESTER: Status: RESOLVED | Noted: 2022-07-19 | Resolved: 2022-10-24

## 2022-10-24 PROBLEM — O14.93 PRE-ECLAMPSIA IN THIRD TRIMESTER: Status: RESOLVED | Noted: 2022-07-20 | Resolved: 2022-10-24

## 2022-11-07 ENCOUNTER — TELEPHONE (OUTPATIENT)
Dept: OBSTETRICS AND GYNECOLOGY | Facility: CLINIC | Age: 32
End: 2022-11-07
Payer: COMMERCIAL

## 2022-11-09 ENCOUNTER — PATIENT MESSAGE (OUTPATIENT)
Dept: OBSTETRICS AND GYNECOLOGY | Facility: CLINIC | Age: 32
End: 2022-11-09
Payer: COMMERCIAL

## 2022-11-09 ENCOUNTER — TELEPHONE (OUTPATIENT)
Dept: OBSTETRICS AND GYNECOLOGY | Facility: CLINIC | Age: 32
End: 2022-11-09
Payer: COMMERCIAL

## 2022-12-22 ENCOUNTER — TELEPHONE (OUTPATIENT)
Dept: OBSTETRICS AND GYNECOLOGY | Facility: CLINIC | Age: 32
End: 2022-12-22
Payer: COMMERCIAL

## 2022-12-22 NOTE — TELEPHONE ENCOUNTER
----- Message from Melly Means sent at 12/22/2022  9:27 AM CST -----  Regarding: CAll BAck  Name of Who is Calling:KEATON JURADO [[id number]]              What is the request in detail: Patient requesting to reschedule her procedure she has today 12- @ 3:45p. Please assist              Can the clinic reply by [facility]: No              [facility] Back [facility]:[phone]

## 2022-12-23 ENCOUNTER — PATIENT MESSAGE (OUTPATIENT)
Dept: OBSTETRICS AND GYNECOLOGY | Facility: CLINIC | Age: 32
End: 2022-12-23
Payer: COMMERCIAL

## 2022-12-30 RX ORDER — DROSPIRENONE AND ETHINYL ESTRADIOL 0.02-3(28)
1 KIT ORAL DAILY
Qty: 28 TABLET | Refills: 11 | Status: SHIPPED | OUTPATIENT
Start: 2022-12-30 | End: 2023-08-15 | Stop reason: SDUPTHER

## 2023-01-06 NOTE — ASSESSMENT & PLAN NOTE
- Platelets downward trendin > 122 ()   - Uric Acid wnl   - Coags wnl (PT-INR, PTT, Fibrinogen)   - Will repeat CBC tomorrow AM      O-T Advancement Flap Text: The defect edges were debeveled with a #15 scalpel blade.  Given the location of the defect, shape of the defect and the proximity to free margins an O-T advancement flap was deemed most appropriate.  Using a sterile surgical marker, an appropriate advancement flap was drawn incorporating the defect and placing the expected incisions within the relaxed skin tension lines where possible.    The area thus outlined was incised deep to adipose tissue with a #15 scalpel blade.  The skin margins were undermined to an appropriate distance in all directions utilizing iris scissors.

## 2023-01-26 ENCOUNTER — PROCEDURE VISIT (OUTPATIENT)
Dept: OBSTETRICS AND GYNECOLOGY | Facility: CLINIC | Age: 33
End: 2023-01-26
Payer: COMMERCIAL

## 2023-01-26 VITALS
SYSTOLIC BLOOD PRESSURE: 128 MMHG | WEIGHT: 143.31 LBS | DIASTOLIC BLOOD PRESSURE: 76 MMHG | BODY MASS INDEX: 24.46 KG/M2 | HEIGHT: 64 IN

## 2023-01-26 DIAGNOSIS — F41.9 ANXIETY: Primary | ICD-10-CM

## 2023-01-26 DIAGNOSIS — D06.9 HIGH GRADE SQUAMOUS INTRAEPITHELIAL LESION (HGSIL), GRADE 3 CIN, ON BIOPSY OF CERVIX: ICD-10-CM

## 2023-01-26 LAB
B-HCG UR QL: NEGATIVE
CTP QC/QA: YES

## 2023-01-26 PROCEDURE — 88342 IMHCHEM/IMCYTCHM 1ST ANTB: CPT | Mod: 59 | Performed by: PATHOLOGY

## 2023-01-26 PROCEDURE — 81025 URINE PREGNANCY TEST: CPT | Mod: S$GLB,,, | Performed by: OBSTETRICS & GYNECOLOGY

## 2023-01-26 PROCEDURE — 88342 IMHCHEM/IMCYTCHM 1ST ANTB: CPT | Mod: 26,,, | Performed by: PATHOLOGY

## 2023-01-26 PROCEDURE — 88305 TISSUE EXAM BY PATHOLOGIST: CPT | Mod: 26,,, | Performed by: PATHOLOGY

## 2023-01-26 PROCEDURE — 81025 POCT URINE PREGNANCY: ICD-10-PCS | Mod: S$GLB,,, | Performed by: OBSTETRICS & GYNECOLOGY

## 2023-01-26 PROCEDURE — 88305 TISSUE EXAM BY PATHOLOGIST: ICD-10-PCS | Mod: 26,,, | Performed by: PATHOLOGY

## 2023-01-26 PROCEDURE — 88342 CHG IMMUNOCYTOCHEMISTRY: ICD-10-PCS | Mod: 26,,, | Performed by: PATHOLOGY

## 2023-01-26 PROCEDURE — 88305 TISSUE EXAM BY PATHOLOGIST: CPT | Performed by: PATHOLOGY

## 2023-01-26 RX ORDER — BUSPIRONE HYDROCHLORIDE 5 MG/1
5 TABLET ORAL 2 TIMES DAILY
Qty: 60 TABLET | Refills: 11 | Status: SHIPPED | OUTPATIENT
Start: 2023-01-26 | End: 2023-11-08

## 2023-01-26 NOTE — PROCEDURES
Procedures    COLPOSCOPY:    Chelsie Green is a 32 y.o. female   presents for colposcopy.  Patient's last menstrual period was 2023..  Her most recent pap smear shows low-grade squamous intraepithelial neoplasia (LGSIL - encompassing HPV,mild dysplasia,OSVALDO I).  However, prior to this, pt had HGSIL pap with OSVALDO 3 on colposcopy.  This was during pregnancy.  Pt is now postpartum and the last pap smear was done postpartum showing LGSIL    The abnormal test findings were discussed, as well as HPV infection, need for colposcopy and possible biopsies to determine the plan of care, treatments available, the minimal risk of bleeding and infection with colposcopy, and alternatives to colposcopy and she agrees to proceed.      UPT is negative    COLPOSCOPY EXAM:   TIME OUT PERFORMED.     acetowhite lesion(s) noted at 2, 4, 7 and 10 o'clock and abnormal vessels noted at 10 o'clock    Biopsy was taken at 2, 4 ,7 and 10 o'clock.  ECC was performed    Hemostasis was adequate with application of Monsel's solution.  The speculum was removed.  The patient did tolerate the procedure well.    All collected specimens sent to pathology for histologic analysis.    Post-colposcopy counseling:  The patient was instructed to manage post-colposcopy cramping with NSAIDs or Tylenol, or with a prescription per the medication card.  Avoid intercourse, douching, or tampons in the vagina for at least 2-3 days.  Expect a clumpy blackish discharge due to Monsel's solution application for several days.  Report heavy bleeding, worsening pain or pain that does not respond to above medications, or foul-smelling vaginal discharge. HPV vaccine recommended according to FDA age guidelines.  Importance of follow-up stressed.      Follow up based on colposcopy results.      Shannon Barrios MD  Obstetrics & Gynecology

## 2023-02-06 LAB
COMMENT: NORMAL
FINAL PATHOLOGIC DIAGNOSIS: NORMAL
GROSS: NORMAL
Lab: NORMAL

## 2023-02-28 RX ORDER — HYDROXYZINE PAMOATE 25 MG/1
25 CAPSULE ORAL EVERY 6 HOURS PRN
Qty: 30 CAPSULE | Refills: 3 | Status: SHIPPED | OUTPATIENT
Start: 2023-02-28 | End: 2023-11-08

## 2023-03-17 ENCOUNTER — PATIENT MESSAGE (OUTPATIENT)
Dept: RESEARCH | Facility: HOSPITAL | Age: 33
End: 2023-03-17
Payer: COMMERCIAL

## 2023-05-30 ENCOUNTER — PATIENT MESSAGE (OUTPATIENT)
Dept: INTERNAL MEDICINE | Facility: CLINIC | Age: 33
End: 2023-05-30
Payer: COMMERCIAL

## 2023-05-30 DIAGNOSIS — Z00.00 ENCOUNTER FOR PREVENTIVE HEALTH EXAMINATION: Primary | ICD-10-CM

## 2023-05-31 ENCOUNTER — PATIENT MESSAGE (OUTPATIENT)
Dept: INTERNAL MEDICINE | Facility: CLINIC | Age: 33
End: 2023-05-31
Payer: COMMERCIAL

## 2023-06-01 ENCOUNTER — LAB VISIT (OUTPATIENT)
Dept: LAB | Facility: HOSPITAL | Age: 33
End: 2023-06-01
Attending: PHYSICIAN ASSISTANT
Payer: COMMERCIAL

## 2023-06-01 DIAGNOSIS — Z00.00 ENCOUNTER FOR PREVENTIVE HEALTH EXAMINATION: ICD-10-CM

## 2023-06-01 LAB
25(OH)D3+25(OH)D2 SERPL-MCNC: 50 NG/ML (ref 30–96)
ALBUMIN SERPL BCP-MCNC: 4.2 G/DL (ref 3.5–5.2)
ALP SERPL-CCNC: 35 U/L (ref 55–135)
ALT SERPL W/O P-5'-P-CCNC: 15 U/L (ref 10–44)
ANION GAP SERPL CALC-SCNC: 7 MMOL/L (ref 8–16)
AST SERPL-CCNC: 14 U/L (ref 10–40)
BASOPHILS # BLD AUTO: 0.02 K/UL (ref 0–0.2)
BASOPHILS NFR BLD: 0.3 % (ref 0–1.9)
BILIRUB SERPL-MCNC: 0.3 MG/DL (ref 0.1–1)
BUN SERPL-MCNC: 13 MG/DL (ref 6–20)
CALCIUM SERPL-MCNC: 9.8 MG/DL (ref 8.7–10.5)
CHLORIDE SERPL-SCNC: 105 MMOL/L (ref 95–110)
CHOLEST SERPL-MCNC: 159 MG/DL (ref 120–199)
CHOLEST/HDLC SERPL: 2.4 {RATIO} (ref 2–5)
CO2 SERPL-SCNC: 26 MMOL/L (ref 23–29)
CREAT SERPL-MCNC: 0.9 MG/DL (ref 0.5–1.4)
DIFFERENTIAL METHOD: NORMAL
EOSINOPHIL # BLD AUTO: 0.1 K/UL (ref 0–0.5)
EOSINOPHIL NFR BLD: 1.7 % (ref 0–8)
ERYTHROCYTE [DISTWIDTH] IN BLOOD BY AUTOMATED COUNT: 12.1 % (ref 11.5–14.5)
EST. GFR  (NO RACE VARIABLE): >60 ML/MIN/1.73 M^2
ESTIMATED AVG GLUCOSE: 97 MG/DL (ref 68–131)
FERRITIN SERPL-MCNC: 60 NG/ML (ref 20–300)
GLUCOSE SERPL-MCNC: 107 MG/DL (ref 70–110)
HBA1C MFR BLD: 5 % (ref 4–5.6)
HCT VFR BLD AUTO: 41.7 % (ref 37–48.5)
HDLC SERPL-MCNC: 65 MG/DL (ref 40–75)
HDLC SERPL: 40.9 % (ref 20–50)
HGB BLD-MCNC: 14.1 G/DL (ref 12–16)
IMM GRANULOCYTES # BLD AUTO: 0.01 K/UL (ref 0–0.04)
IMM GRANULOCYTES NFR BLD AUTO: 0.2 % (ref 0–0.5)
IRON SERPL-MCNC: 80 UG/DL (ref 30–160)
LDLC SERPL CALC-MCNC: 74.8 MG/DL (ref 63–159)
LYMPHOCYTES # BLD AUTO: 2.5 K/UL (ref 1–4.8)
LYMPHOCYTES NFR BLD: 43.4 % (ref 18–48)
MCH RBC QN AUTO: 28.5 PG (ref 27–31)
MCHC RBC AUTO-ENTMCNC: 33.8 G/DL (ref 32–36)
MCV RBC AUTO: 84 FL (ref 82–98)
MONOCYTES # BLD AUTO: 0.4 K/UL (ref 0.3–1)
MONOCYTES NFR BLD: 6.3 % (ref 4–15)
NEUTROPHILS # BLD AUTO: 2.8 K/UL (ref 1.8–7.7)
NEUTROPHILS NFR BLD: 48.1 % (ref 38–73)
NONHDLC SERPL-MCNC: 94 MG/DL
NRBC BLD-RTO: 0 /100 WBC
PLATELET # BLD AUTO: 227 K/UL (ref 150–450)
PMV BLD AUTO: 10.1 FL (ref 9.2–12.9)
POTASSIUM SERPL-SCNC: 4.2 MMOL/L (ref 3.5–5.1)
PROT SERPL-MCNC: 7.6 G/DL (ref 6–8.4)
RBC # BLD AUTO: 4.94 M/UL (ref 4–5.4)
SATURATED IRON: 17 % (ref 20–50)
SODIUM SERPL-SCNC: 138 MMOL/L (ref 136–145)
TOTAL IRON BINDING CAPACITY: 462 UG/DL (ref 250–450)
TRANSFERRIN SERPL-MCNC: 312 MG/DL (ref 200–375)
TRIGL SERPL-MCNC: 96 MG/DL (ref 30–150)
TSH SERPL DL<=0.005 MIU/L-ACNC: 2.15 UIU/ML (ref 0.4–4)
VIT B12 SERPL-MCNC: 714 PG/ML (ref 210–950)
WBC # BLD AUTO: 5.85 K/UL (ref 3.9–12.7)

## 2023-06-01 PROCEDURE — 36415 COLL VENOUS BLD VENIPUNCTURE: CPT | Performed by: PHYSICIAN ASSISTANT

## 2023-06-01 PROCEDURE — 84443 ASSAY THYROID STIM HORMONE: CPT | Performed by: PHYSICIAN ASSISTANT

## 2023-06-01 PROCEDURE — 82306 VITAMIN D 25 HYDROXY: CPT | Performed by: PHYSICIAN ASSISTANT

## 2023-06-01 PROCEDURE — 80061 LIPID PANEL: CPT | Performed by: PHYSICIAN ASSISTANT

## 2023-06-01 PROCEDURE — 84466 ASSAY OF TRANSFERRIN: CPT | Performed by: PHYSICIAN ASSISTANT

## 2023-06-01 PROCEDURE — 82607 VITAMIN B-12: CPT | Performed by: PHYSICIAN ASSISTANT

## 2023-06-01 PROCEDURE — 80053 COMPREHEN METABOLIC PANEL: CPT | Performed by: PHYSICIAN ASSISTANT

## 2023-06-01 PROCEDURE — 83036 HEMOGLOBIN GLYCOSYLATED A1C: CPT | Performed by: PHYSICIAN ASSISTANT

## 2023-06-01 PROCEDURE — 82728 ASSAY OF FERRITIN: CPT | Performed by: PHYSICIAN ASSISTANT

## 2023-06-01 PROCEDURE — 85025 COMPLETE CBC W/AUTO DIFF WBC: CPT | Performed by: PHYSICIAN ASSISTANT

## 2023-08-15 RX ORDER — DROSPIRENONE AND ETHINYL ESTRADIOL 0.02-3(28)
1 KIT ORAL DAILY
Qty: 28 TABLET | Refills: 11 | Status: SHIPPED | OUTPATIENT
Start: 2023-08-15 | End: 2023-11-08

## 2023-11-06 ENCOUNTER — PATIENT MESSAGE (OUTPATIENT)
Dept: INTERNAL MEDICINE | Facility: CLINIC | Age: 33
End: 2023-11-06
Payer: COMMERCIAL

## 2023-11-08 ENCOUNTER — OFFICE VISIT (OUTPATIENT)
Dept: INTERNAL MEDICINE | Facility: CLINIC | Age: 33
End: 2023-11-08
Payer: COMMERCIAL

## 2023-11-08 VITALS
OXYGEN SATURATION: 95 % | BODY MASS INDEX: 23.22 KG/M2 | DIASTOLIC BLOOD PRESSURE: 62 MMHG | SYSTOLIC BLOOD PRESSURE: 112 MMHG | HEART RATE: 55 BPM | WEIGHT: 136 LBS | HEIGHT: 64 IN

## 2023-11-08 DIAGNOSIS — Z00.00 ROUTINE GENERAL MEDICAL EXAMINATION AT A HEALTH CARE FACILITY: Primary | ICD-10-CM

## 2023-11-08 PROCEDURE — 3074F PR MOST RECENT SYSTOLIC BLOOD PRESSURE < 130 MM HG: ICD-10-PCS | Mod: CPTII,S$GLB,, | Performed by: INTERNAL MEDICINE

## 2023-11-08 PROCEDURE — 3078F DIAST BP <80 MM HG: CPT | Mod: CPTII,S$GLB,, | Performed by: INTERNAL MEDICINE

## 2023-11-08 PROCEDURE — 3044F HG A1C LEVEL LT 7.0%: CPT | Mod: CPTII,S$GLB,, | Performed by: INTERNAL MEDICINE

## 2023-11-08 PROCEDURE — 3044F PR MOST RECENT HEMOGLOBIN A1C LEVEL <7.0%: ICD-10-PCS | Mod: CPTII,S$GLB,, | Performed by: INTERNAL MEDICINE

## 2023-11-08 PROCEDURE — 3008F PR BODY MASS INDEX (BMI) DOCUMENTED: ICD-10-PCS | Mod: CPTII,S$GLB,, | Performed by: INTERNAL MEDICINE

## 2023-11-08 PROCEDURE — 1159F MED LIST DOCD IN RCRD: CPT | Mod: CPTII,S$GLB,, | Performed by: INTERNAL MEDICINE

## 2023-11-08 PROCEDURE — 99395 PREV VISIT EST AGE 18-39: CPT | Mod: S$GLB,,, | Performed by: INTERNAL MEDICINE

## 2023-11-08 PROCEDURE — 99395 PR PREVENTIVE VISIT,EST,18-39: ICD-10-PCS | Mod: S$GLB,,, | Performed by: INTERNAL MEDICINE

## 2023-11-08 PROCEDURE — 3008F BODY MASS INDEX DOCD: CPT | Mod: CPTII,S$GLB,, | Performed by: INTERNAL MEDICINE

## 2023-11-08 PROCEDURE — 3078F PR MOST RECENT DIASTOLIC BLOOD PRESSURE < 80 MM HG: ICD-10-PCS | Mod: CPTII,S$GLB,, | Performed by: INTERNAL MEDICINE

## 2023-11-08 PROCEDURE — 1159F PR MEDICATION LIST DOCUMENTED IN MEDICAL RECORD: ICD-10-PCS | Mod: CPTII,S$GLB,, | Performed by: INTERNAL MEDICINE

## 2023-11-08 PROCEDURE — 99999 PR PBB SHADOW E&M-EST. PATIENT-LVL III: CPT | Mod: PBBFAC,,, | Performed by: INTERNAL MEDICINE

## 2023-11-08 PROCEDURE — 99999 PR PBB SHADOW E&M-EST. PATIENT-LVL III: ICD-10-PCS | Mod: PBBFAC,,, | Performed by: INTERNAL MEDICINE

## 2023-11-08 PROCEDURE — 3074F SYST BP LT 130 MM HG: CPT | Mod: CPTII,S$GLB,, | Performed by: INTERNAL MEDICINE

## 2023-11-08 NOTE — PROGRESS NOTES
"Chief Complaint: Annual exam     HPI: This is a 33 year old woman who presents for her annual exam.     She is a pharmacist at [employer].     She is  - 2 children - son age 4 and daughter age 1 (Born in July 2022 as a premature at 31 4/7 weeks - hospitalized 5 weeks    Mood is good.  She is busy.  Sleep is good.  Children are sleeping through the night.    She has intermittent carpal tunnel syndrome symptoms bilaterally.   She does not wear braces. Symptoms do not wake her up at night.  She will get numbness and shooting pain at times.   .    Periods are montly - lasting 4-5 days - first day heavy then light.         REVIEW OF SYSTEMS: No fevers, chills, night sweats, fatigue, visual change, hearing loss, sinus congestion, sore throat, chest pain, shortness of breath, nausea, vomiting, constipation, diarrhea, dysuria, hematuria, polydipsia, polyuria, joint pain, muscle pain, headaches,.               Past Medical History    Diagnosis  Date      Scoliosis          didn' t need brace      Heart murmur          evaluated as a child, told not to be a problem      Abnormal Pap smear of cervix                     Past Surgical History    Procedure  Laterality  Date      Tonsillectomy, adenoidectomy          Nose surgery            correct fracture of nose      Breast augmentation                 Meds and allergies: updated on EPIC     Social History: updated     Family History: updated      Physical exam:  /62 (BP Location: Right arm, Patient Position: Sitting, BP Method: Medium (Manual))   Pulse (!) 55   Ht 5' 4" (1.626 m)   Wt 61.7 kg (136 lb)   SpO2 95%   BMI 23.34 kg/m²     General: alert, oriented x 3, no apparent distress.  Affect normal  HEENT: Conjunctivae: anicteric, PERRL, EOMI, TM clear, Oralpharynx clear  Neck: supple, no thyroid enlargement, no cervical lymphadenopathy  Resp: effort normal, lungs clear bilaterally  CV: Regular rate and rhythm with 3/6 systolic murmur, No gallops or " rubs, no lower extremity edema,  ABDOMEN: soft, non distended, non tender, bowel sounds present, no hepatosplenomgaly  BREAST: no abn seen, no nodules palpated, no axillary lad      labs 6/1/23 reviewed     Assessment/Plan:  Annual exam - discussed diet, exercise and safety issues.  Pap - LCIS 1/2023 - due 1/2024    She is to return in one year, sooner if issues

## 2023-12-28 ENCOUNTER — PATIENT MESSAGE (OUTPATIENT)
Dept: OBSTETRICS AND GYNECOLOGY | Facility: CLINIC | Age: 33
End: 2023-12-28
Payer: COMMERCIAL

## 2024-02-05 ENCOUNTER — E-VISIT (OUTPATIENT)
Dept: INTERNAL MEDICINE | Facility: CLINIC | Age: 34
End: 2024-02-05
Payer: COMMERCIAL

## 2024-02-05 ENCOUNTER — PATIENT MESSAGE (OUTPATIENT)
Dept: INTERNAL MEDICINE | Facility: CLINIC | Age: 34
End: 2024-02-05

## 2024-02-05 DIAGNOSIS — N39.0 URINARY TRACT INFECTION WITHOUT HEMATURIA, SITE UNSPECIFIED: Primary | ICD-10-CM

## 2024-02-05 PROCEDURE — 99421 OL DIG E/M SVC 5-10 MIN: CPT | Mod: ,,, | Performed by: INTERNAL MEDICINE

## 2024-02-05 RX ORDER — NITROFURANTOIN 25; 75 MG/1; MG/1
100 CAPSULE ORAL 2 TIMES DAILY
Qty: 14 CAPSULE | Refills: 0 | Status: SHIPPED | OUTPATIENT
Start: 2024-02-05 | End: 2024-02-12

## 2024-02-05 NOTE — PROGRESS NOTES
Patient ID: Chelsie Green is a 33 y.o. female.    Chief Complaint: Urinary Tract Infection (Entered automatically based on patient selection in Patient Portal.)    The patient initiated a request through [email or web address] on 2/5/2024 for evaluation and management with a chief complaint of Urinary Tract Infection (Entered automatically based on patient selection in Patient Portal.)     I evaluated the questionnaire submission on 2/5/24.    Ohs Peq Evisit Uti Questionnaire    2/5/2024  7:36 AM CST - Filed by Patient   Do you agree to participate in an E-Visit? Yes   If you have any of the following problems, please present to your local ER or call 911:  I acknowledge   What is the main issue that you would like for your doctor to address today? Urinary freq, urgency, burning   Are you able to take your vital signs? No   Are you currently pregnant, could you be pregnant, or are you breast feeding? None of the above   What symptoms do you currently have? Pain while passing urine;  Change in urine appearance or smell   When did your symptoms first appear? 2/5/2024   List what you have done or taken to help your symptoms. AZO otc   Please indicate whether you have had the following symptoms during the past 24 hours     Urgent urination (a sudden and uncontrollable urge to urinate) Moderate   Frequent urination of small amounts of urine (going to the toilet very often) Moderate   Burning pain when urinating Moderate   Incomplete bladder emptying (still feel like you need to urinate again after urination) None   Pain not associated with urination in the lower abdomen below the belly button) None   What does your urine look like? Cloudy   Blood seen in the urine None   Flank pain (pain in one or both sides of the lower back) None   Abnormal Vaginal Discharge (abnormal amount, color and/or odor) None   Discharge from the urethra (urinary opening) without urination None   High body temperature/fever? None-<99.5   Please rate  how much discomfort you have experience because of the symptoms in the past 24 hours: Moderate   Please indicate how the symptoms have interfered with your every day activities/work in the past 24 hours: Moderate   Please indicate how these symptoms have interfered with your social activities (visiting people, meeting with friends, etc.) in the past 24 hours? None   Are you a diabetic? No   Please indicate whether you have the following at the time of completion of this questionnaire: None of the above   Provide any information you feel is important to your history not asked above    Please attach any relevant images or files (if you have performed a home test for UTI, please submit a photo of results)          Encounter Diagnosis   Name Primary?    Urinary tract infection without hematuria, site unspecified Yes        No orders of the defined types were placed in this encounter.     Medications Ordered This Encounter   Medications    nitrofurantoin, macrocrystal-monohydrate, (MACROBID) 100 MG capsule     Sig: Take 1 capsule (100 mg total) by mouth 2 (two) times daily. for 7 days     Dispense:  14 capsule     Refill:  0        No follow-ups on file.      E-Visit Time Trackin minutes

## 2024-03-18 ENCOUNTER — PATIENT MESSAGE (OUTPATIENT)
Dept: ADMINISTRATIVE | Facility: HOSPITAL | Age: 34
End: 2024-03-18
Payer: COMMERCIAL

## 2024-03-28 ENCOUNTER — OFFICE VISIT (OUTPATIENT)
Dept: OBSTETRICS AND GYNECOLOGY | Facility: CLINIC | Age: 34
End: 2024-03-28
Payer: COMMERCIAL

## 2024-03-28 VITALS
DIASTOLIC BLOOD PRESSURE: 68 MMHG | WEIGHT: 135.81 LBS | SYSTOLIC BLOOD PRESSURE: 106 MMHG | BODY MASS INDEX: 23.18 KG/M2 | HEIGHT: 64 IN

## 2024-03-28 DIAGNOSIS — Z87.410 HISTORY OF CERVICAL DYSPLASIA: ICD-10-CM

## 2024-03-28 DIAGNOSIS — L70.9 ACNE, UNSPECIFIED ACNE TYPE: ICD-10-CM

## 2024-03-28 DIAGNOSIS — Z01.419 ENCOUNTER FOR WELL WOMAN EXAM WITH ROUTINE GYNECOLOGICAL EXAM: Primary | ICD-10-CM

## 2024-03-28 PROCEDURE — 3078F DIAST BP <80 MM HG: CPT | Mod: CPTII,S$GLB,, | Performed by: OBSTETRICS & GYNECOLOGY

## 2024-03-28 PROCEDURE — 1159F MED LIST DOCD IN RCRD: CPT | Mod: CPTII,S$GLB,, | Performed by: OBSTETRICS & GYNECOLOGY

## 2024-03-28 PROCEDURE — 87624 HPV HI-RISK TYP POOLED RSLT: CPT | Performed by: OBSTETRICS & GYNECOLOGY

## 2024-03-28 PROCEDURE — 3074F SYST BP LT 130 MM HG: CPT | Mod: CPTII,S$GLB,, | Performed by: OBSTETRICS & GYNECOLOGY

## 2024-03-28 PROCEDURE — 99999 PR PBB SHADOW E&M-EST. PATIENT-LVL III: CPT | Mod: PBBFAC,,, | Performed by: OBSTETRICS & GYNECOLOGY

## 2024-03-28 PROCEDURE — 3008F BODY MASS INDEX DOCD: CPT | Mod: CPTII,S$GLB,, | Performed by: OBSTETRICS & GYNECOLOGY

## 2024-03-28 PROCEDURE — 88175 CYTOPATH C/V AUTO FLUID REDO: CPT | Performed by: PATHOLOGY

## 2024-03-28 PROCEDURE — 99395 PREV VISIT EST AGE 18-39: CPT | Mod: S$GLB,,, | Performed by: OBSTETRICS & GYNECOLOGY

## 2024-03-28 RX ORDER — SPIRONOLACTONE 50 MG/1
50 TABLET, FILM COATED ORAL DAILY
Qty: 90 TABLET | Refills: 4 | Status: SHIPPED | OUTPATIENT
Start: 2024-03-28 | End: 2025-03-28

## 2024-03-28 NOTE — PROGRESS NOTES
History & Physical  Gynecology      SUBJECTIVE:     Chief Complaint: well woman     History of Present Illness:    Chelsie Green is a 34 y.o. female  here for annual routine Pap and checkup. Patient's last menstrual period was 2024..  She has no unusual complaints.      She describes her periods as regular, lasting 5-6 days. normal flow.  denies break through bleeding.   denies vaginal itching or irritation.  denies vaginal discharge.    She is sexually active with 1 partner  She uses vasectomy for contraception.    History of abnormal pap: Yes - OSVALDO 3 - - was pregnant at the time,  repeat pap and colpo after was OSVALDO 1.  Last pap LGSIL, with OSVALDO 1 on colpo in   Last Pap: 2022  Last MMG: No  Last Colonoscopy:  No        Review of patient's allergies indicates:  No Known Allergies    Past Medical History:   Diagnosis Date    Abnormal Pap smear of cervix     Heart murmur     evaluated as a child, told not to be a problem    Scoliosis     didn' t need brace     Past Surgical History:   Procedure Laterality Date    breast augmentation      BREAST SURGERY      COSMETIC SURGERY      NOSE SURGERY      correct fracture of nose    TONSILLECTOMY, ADENOIDECTOMY       OB History          2    Para   2    Term   1       1    AB        Living   2         SAB        IAB        Ectopic        Multiple   0    Live Births   2               Family History   Problem Relation Age of Onset    Stroke Mother     Hypertension Father     Breast cancer Neg Hx     Colon cancer Neg Hx     Ovarian cancer Neg Hx     Cancer Neg Hx      Social History     Tobacco Use    Smoking status: Never    Smokeless tobacco: Never   Substance Use Topics    Alcohol use: Not Currently     Alcohol/week: 2.0 standard drinks of alcohol     Types: 2 Glasses of wine per week     Comment: once a week    Drug use: No       Current Outpatient Medications   Medication Sig    spironolactone (ALDACTONE) 50 MG tablet  Take 1 tablet (50 mg total) by mouth once daily.     No current facility-administered medications for this visit.         Review of Systems:  Review of Systems   Constitutional:  Negative for activity change, appetite change, chills, fatigue, fever and unexpected weight change.   Respiratory:  Negative for cough, shortness of breath and wheezing.    Cardiovascular:  Negative for chest pain and leg swelling.   Gastrointestinal:  Negative for abdominal pain, constipation, diarrhea, nausea and vomiting.   Endocrine: Negative for hair loss and hot flashes.   Genitourinary:  Negative for decreased libido, dyspareunia, dysuria, frequency, menstrual problem, pelvic pain, vaginal bleeding, vaginal discharge and vaginal pain.   Integumentary:  Negative for acne, hair changes, nipple discharge and breast skin changes.   Neurological:  Negative for headaches.   Psychiatric/Behavioral:  Negative for sleep disturbance.    Breast: Negative for mastodynia, nipple discharge and skin changes       OBJECTIVE:     Physical Exam:  Physical Exam  Constitutional:       Appearance: She is well-developed.   HENT:      Head: Normocephalic and atraumatic.   Eyes:      General: No scleral icterus.        Right eye: No discharge.         Left eye: No discharge.      Conjunctiva/sclera: Conjunctivae normal.   Pulmonary:      Effort: Pulmonary effort is normal.      Breath sounds: No stridor.   Chest:      Chest wall: No mass or tenderness.   Breasts:     Breasts are symmetrical.      Right: No inverted nipple, mass, nipple discharge, skin change or tenderness.      Left: No inverted nipple, mass, nipple discharge, skin change or tenderness.   Abdominal:      General: There is no distension.      Palpations: Abdomen is soft.      Tenderness: There is no abdominal tenderness.   Genitourinary:     Labia:         Right: No rash, tenderness, lesion or injury.         Left: No rash, tenderness, lesion or injury.       Vagina: Normal.      Cervix: No  cervical motion tenderness, discharge or friability.      Adnexa:         Right: No mass, tenderness or fullness.          Left: No mass, tenderness or fullness.        Comments: Normal external genitalia.  Normal hair distribution.  Urethral meatus normal. No cervical lesions or masses.  No vaginal bleeding noted.  No adnexal or uterine tenderness.  No palpable adnexal masses.  Musculoskeletal:         General: Normal range of motion.   Skin:     General: Skin is warm and dry.   Neurological:      Mental Status: She is alert and oriented to person, place, and time.   Psychiatric:         Behavior: Behavior normal.         Thought Content: Thought content normal.         Judgment: Judgment normal.           ASSESSMENT:       ICD-10-CM ICD-9-CM    1. Encounter for well woman exam with routine gynecological exam  Z01.419 V72.31 Liquid-Based Pap Smear, Screening      HPV High Risk Genotypes, PCR      2. Acne, unspecified acne type  L70.9 706.1 spironolactone (ALDACTONE) 50 MG tablet      3. History of cervical dysplasia  Z87.410 V13.22 Liquid-Based Pap Smear, Screening      HPV High Risk Genotypes, PCR             Plan:      Diagnoses and all orders for this visit:    Encounter for well woman exam with routine gynecological exam  -     Liquid-Based Pap Smear, Screening  -     HPV High Risk Genotypes, PCR  - Cotesting today.  Hx of cervical dysplasia.  CIN3 previously.  Improved to OSVALDO 1  - MMG and Cscope not indicated at this time    Acne, unspecified acne type  -     spironolactone (ALDACTONE) 50 MG tablet; Take 1 tablet (50 mg total) by mouth once daily.    History of cervical dysplasia  -     Liquid-Based Pap Smear, Screening  -     HPV High Risk Genotypes, PCR        Orders Placed This Encounter   Procedures    HPV High Risk Genotypes, PCR       No follow-ups on file.     Counseling time: 15 minutes    Shannon Barrios

## 2024-04-05 LAB
HPV HR 12 DNA SPEC QL NAA+PROBE: NEGATIVE
HPV16 AG SPEC QL: NEGATIVE
HPV18 DNA SPEC QL NAA+PROBE: NEGATIVE

## 2024-04-08 LAB
FINAL PATHOLOGIC DIAGNOSIS: ABNORMAL
Lab: ABNORMAL

## 2024-04-13 ENCOUNTER — PATIENT MESSAGE (OUTPATIENT)
Dept: OBSTETRICS AND GYNECOLOGY | Facility: CLINIC | Age: 34
End: 2024-04-13
Payer: COMMERCIAL

## 2024-04-24 ENCOUNTER — PROCEDURE VISIT (OUTPATIENT)
Dept: OBSTETRICS AND GYNECOLOGY | Facility: CLINIC | Age: 34
End: 2024-04-24
Payer: COMMERCIAL

## 2024-04-24 VITALS
WEIGHT: 131.81 LBS | HEIGHT: 64 IN | BODY MASS INDEX: 22.5 KG/M2 | SYSTOLIC BLOOD PRESSURE: 132 MMHG | DIASTOLIC BLOOD PRESSURE: 78 MMHG

## 2024-04-24 DIAGNOSIS — R87.619 ATYPICAL GLANDULAR CELLS ON CERVICAL PAP SMEAR: Primary | ICD-10-CM

## 2024-04-24 LAB
B-HCG UR QL: NEGATIVE
CTP QC/QA: YES

## 2024-04-24 PROCEDURE — 88305 TISSUE EXAM BY PATHOLOGIST: CPT | Mod: 26,,, | Performed by: PATHOLOGY

## 2024-04-24 PROCEDURE — 57454 BX/CURETT OF CERVIX W/SCOPE: CPT | Mod: S$GLB,,, | Performed by: OBSTETRICS & GYNECOLOGY

## 2024-04-24 PROCEDURE — 81025 URINE PREGNANCY TEST: CPT | Mod: S$GLB,,, | Performed by: OBSTETRICS & GYNECOLOGY

## 2024-04-24 PROCEDURE — 88305 TISSUE EXAM BY PATHOLOGIST: CPT | Performed by: PATHOLOGY

## 2024-04-24 PROCEDURE — 88342 IMHCHEM/IMCYTCHM 1ST ANTB: CPT | Mod: 26,,, | Performed by: PATHOLOGY

## 2024-04-24 PROCEDURE — 58110 BX DONE W/COLPOSCOPY ADD-ON: CPT | Mod: S$GLB,,, | Performed by: OBSTETRICS & GYNECOLOGY

## 2024-04-24 PROCEDURE — 88342 IMHCHEM/IMCYTCHM 1ST ANTB: CPT | Mod: 59 | Performed by: PATHOLOGY

## 2024-04-24 NOTE — PROCEDURES
Colposcopy    Date/Time: 2024 1:45 PM    Performed by: Shannon Barrios MD  Authorized by: Shannon Barrios MD      Colposcopy Site:  Cervix  Acrowhite Lesion? Yes    Atypical Vessels: No    Transformation Zone Adequate?: Yes    Biopsy?: Yes         Location:  Cervix ((1 00 and 7 00))  ECC Performed?: Yes    LEEP Performed?: No    Estimated blood loss (cc):  5   Patient tolerated the procedure well with no immediate complications.   Post-operative instructions were provided for the patient.   Patient was discharged and will follow up if any complications occur  Endometrial biopsy    Date/Time: 2024 1:45 PM    Performed by: Shannon Barrios MD  Authorized by: Shannon Barrios MD    Consent:     Consent given by:  Patient    Patient questions answered: yes      Patient agrees, verbalizes understanding, and wants to proceed: yes      Educational handouts given: yes      Instructions and paperwork completed: yes    Indication:     Indications: FREDY    Procedure:     Procedure: endometrial biopsy with Pipelle      A paracervical block was performed: no      An intracervical block was performed: no      The cervix was dilated: no      Uterus sounded: yes      Uterus sound depth (cm):  6.5    Curettes used:  2    Specimen collected: specimen collected and sent to pathology      Patient tolerated procedure well with no complications: yes          COLPOSCOPY:    Chelsie Green is a 34 y.o. female   presents for colposcopy.  Patient's last menstrual period was 04/15/2024..  Her most recent pap smear shows glandular cell abnormality (FREDY).    The abnormal test findings were discussed, as well as HPV infection, need for colposcopy and possible biopsies to determine the plan of care, treatments available, the minimal risk of bleeding and infection with colposcopy, and alternatives to colposcopy and she agrees to proceed.      UPT is negative    COLPOSCOPY EXAM:   TIME OUT PERFORMED.      acetowhite lesion(s) noted at 1 and 7 o'clock    Biopsy was taken at 1 and 7 o'clock.  ECC was performed    A sterile endometrial pipelle was inserted into the uterus to a sound length of 6.5 cm. 2 passes were made with the pipelle and moderate amount of tissue was obtained. The specimen was placed in formalin and sent to Pathology for evaluation.   Hemostasis was adequate with application of Monsel's solution.  The speculum was removed.  The patient did tolerate the procedure well.    All collected specimens sent to pathology for histologic analysis.    Post-colposcopy counseling:  The patient was instructed to manage post-colposcopy cramping with NSAIDs or Tylenol, or with a prescription per the medication card.  Avoid intercourse, douching, or tampons in the vagina for at least 2-3 days.  Expect a clumpy blackish discharge due to Monsel's solution application for several days.  Report heavy bleeding, worsening pain or pain that does not respond to above medications, or foul-smelling vaginal discharge. HPV vaccine recommended according to FDA age guidelines.  Importance of follow-up stressed.      Follow up based on colposcopy results.      Shannon Barrios MD  Obstetrics & Gynecology

## 2024-04-30 ENCOUNTER — PATIENT MESSAGE (OUTPATIENT)
Dept: OBSTETRICS AND GYNECOLOGY | Facility: CLINIC | Age: 34
End: 2024-04-30
Payer: COMMERCIAL

## 2024-04-30 LAB
FINAL PATHOLOGIC DIAGNOSIS: NORMAL
GROSS: NORMAL
Lab: NORMAL

## 2024-05-23 ENCOUNTER — PATIENT MESSAGE (OUTPATIENT)
Dept: OBSTETRICS AND GYNECOLOGY | Facility: CLINIC | Age: 34
End: 2024-05-23
Payer: COMMERCIAL

## 2024-05-23 DIAGNOSIS — Z79.899 ON POTASSIUM WASTING DIURETIC THERAPY: Primary | ICD-10-CM

## 2024-06-03 ENCOUNTER — LAB VISIT (OUTPATIENT)
Dept: LAB | Facility: HOSPITAL | Age: 34
End: 2024-06-03
Attending: OBSTETRICS & GYNECOLOGY
Payer: COMMERCIAL

## 2024-06-03 DIAGNOSIS — Z79.899 ON POTASSIUM WASTING DIURETIC THERAPY: ICD-10-CM

## 2024-06-03 LAB
ALBUMIN SERPL BCP-MCNC: 4.2 G/DL (ref 3.5–5.2)
ALP SERPL-CCNC: 40 U/L (ref 55–135)
ALT SERPL W/O P-5'-P-CCNC: 12 U/L (ref 10–44)
ANION GAP SERPL CALC-SCNC: 5 MMOL/L (ref 8–16)
AST SERPL-CCNC: 16 U/L (ref 10–40)
BILIRUB SERPL-MCNC: 0.8 MG/DL (ref 0.1–1)
BUN SERPL-MCNC: 11 MG/DL (ref 6–20)
CALCIUM SERPL-MCNC: 10.2 MG/DL (ref 8.7–10.5)
CHLORIDE SERPL-SCNC: 106 MMOL/L (ref 95–110)
CO2 SERPL-SCNC: 27 MMOL/L (ref 23–29)
CREAT SERPL-MCNC: 0.8 MG/DL (ref 0.5–1.4)
EST. GFR  (NO RACE VARIABLE): >60 ML/MIN/1.73 M^2
GLUCOSE SERPL-MCNC: 99 MG/DL (ref 70–110)
POTASSIUM SERPL-SCNC: 3.9 MMOL/L (ref 3.5–5.1)
PROT SERPL-MCNC: 7.5 G/DL (ref 6–8.4)
SODIUM SERPL-SCNC: 138 MMOL/L (ref 136–145)

## 2024-06-03 PROCEDURE — 80053 COMPREHEN METABOLIC PANEL: CPT | Performed by: OBSTETRICS & GYNECOLOGY

## 2024-06-03 PROCEDURE — 36415 COLL VENOUS BLD VENIPUNCTURE: CPT | Performed by: OBSTETRICS & GYNECOLOGY

## 2024-06-10 ENCOUNTER — PATIENT MESSAGE (OUTPATIENT)
Dept: INTERNAL MEDICINE | Facility: CLINIC | Age: 34
End: 2024-06-10
Payer: COMMERCIAL

## 2024-07-17 ENCOUNTER — E-VISIT (OUTPATIENT)
Dept: INTERNAL MEDICINE | Facility: CLINIC | Age: 34
End: 2024-07-17
Payer: COMMERCIAL

## 2024-07-17 ENCOUNTER — LAB VISIT (OUTPATIENT)
Dept: LAB | Facility: HOSPITAL | Age: 34
End: 2024-07-17
Attending: INTERNAL MEDICINE
Payer: COMMERCIAL

## 2024-07-17 DIAGNOSIS — N39.0 URINARY TRACT INFECTION WITHOUT HEMATURIA, SITE UNSPECIFIED: Primary | ICD-10-CM

## 2024-07-17 DIAGNOSIS — N39.0 URINARY TRACT INFECTION WITHOUT HEMATURIA, SITE UNSPECIFIED: ICD-10-CM

## 2024-07-17 LAB
BILIRUB UR QL STRIP: NEGATIVE
CLARITY UR: CLEAR
COLOR UR: YELLOW
GLUCOSE UR QL STRIP: NEGATIVE
HGB UR QL STRIP: ABNORMAL
KETONES UR QL STRIP: ABNORMAL
LEUKOCYTE ESTERASE UR QL STRIP: NEGATIVE
NITRITE UR QL STRIP: NEGATIVE
PH UR STRIP: 6 [PH] (ref 5–8)
PROT UR QL STRIP: NEGATIVE
SP GR UR STRIP: 1.01 (ref 1–1.03)
URN SPEC COLLECT METH UR: ABNORMAL
UROBILINOGEN UR STRIP-ACNC: NEGATIVE EU/DL

## 2024-07-17 PROCEDURE — 81003 URINALYSIS AUTO W/O SCOPE: CPT | Performed by: INTERNAL MEDICINE

## 2024-07-17 RX ORDER — NITROFURANTOIN 25; 75 MG/1; MG/1
100 CAPSULE ORAL 2 TIMES DAILY
Qty: 14 CAPSULE | Refills: 0 | Status: SHIPPED | OUTPATIENT
Start: 2024-07-17 | End: 2024-07-24

## 2024-07-17 NOTE — PROGRESS NOTES
Patient ID: Chelsie Green is a 34 y.o. female.    Chief Complaint: Urinary Tract Infection (Entered automatically based on patient selection in [employer].)    The patient initiated a request through [employer] on 7/17/2024 for evaluation and management with a chief complaint of Urinary Tract Infection (Entered automatically based on patient selection in [employer].)     I evaluated the questionnaire submission on 7/17/24.    Ohs Peq Evisit Uti Questionnaire    7/17/2024  3:25 AM CDT - Filed by Patient   Do you agree to participate in an E-Visit? Yes   If you have any of the following symptoms, please present to your local emergency room or call 911:  I acknowledge   Are you pregnant, could you be pregnant, or are you breast feeding? None of the above   What is the main issue you would like addressed today? Urinary burning   What symptoms do you currently have? Pain while passing urine   When did your symptoms first appear? 7/17/2024   List what you have done or taken to help your symptoms. Azo   Please indicate whether you have had the following symptoms during the past 24 hours     Urgent urination (a sudden and uncontrollable urge to urinate) Moderate   Frequent urination of small amounts of urine (going to the toilet very often) Moderate   Burning pain when urinating Moderate   Incomplete bladder emptying (still feel like you need to urinate again after urination) Moderate   Pain not associated with urination in the lower abdomen below the belly button) None   What does your urine look like? I am not sure   Blood seen in the urine None   Flank pain (pain in one or both sides of the lower back) None   Abnormal Vaginal Discharge (abnormal amount, color and/or odor) None   Discharge from the urethra (urinary opening) without urination None   High body temperature/fever? None-<99.5   Please rate how much discomfort you have experience because of the symptoms in the past 24 hours: Moderate   Please indicate how  the symptoms have interfered with your every day activities/work in the past 24 hours: Moderate   Please indicate how these symptoms have interfered with your social activities (visiting people, meeting with friends, etc.) in the past 24 hours? None   Are you a diabetic? No   Please indicate whether you have the following at the time of completion of this questionnaire: None of the above   Provide any additional information you feel is important.    Please attach any relevant images or files (if you have performed a home test for UTI, please submit a photo of results)    Are you able to take your vital signs? No         Encounter Diagnosis   Name Primary?    Urinary tract infection without hematuria, site unspecified Yes        Orders Placed This Encounter   Procedures    Urine culture     Standing Status:   Future     Standing Expiration Date:   10/15/2025    Urinalysis     Standing Status:   Future     Number of Occurrences:   1     Standing Expiration Date:   9/15/2025     Order Specific Question:   Collection Type     Answer:   Urine, Clean Catch      Medications Ordered This Encounter   Medications    nitrofurantoin, macrocrystal-monohydrate, (MACROBID) 100 MG capsule     Sig: Take 1 capsule (100 mg total) by mouth 2 (two) times daily. for 7 days     Dispense:  14 capsule     Refill:  0        No follow-ups on file.      E-Visit Time Trackin minutes

## 2024-09-09 ENCOUNTER — PATIENT MESSAGE (OUTPATIENT)
Dept: INTERNAL MEDICINE | Facility: CLINIC | Age: 34
End: 2024-09-09
Payer: COMMERCIAL

## 2024-09-09 DIAGNOSIS — N39.0 URINARY TRACT INFECTION WITHOUT HEMATURIA, SITE UNSPECIFIED: ICD-10-CM

## 2024-09-09 DIAGNOSIS — Z00.00 ROUTINE GENERAL MEDICAL EXAMINATION AT A HEALTH CARE FACILITY: Primary | ICD-10-CM

## 2024-09-10 ENCOUNTER — PATIENT MESSAGE (OUTPATIENT)
Dept: INTERNAL MEDICINE | Facility: CLINIC | Age: 34
End: 2024-09-10
Payer: COMMERCIAL

## 2024-09-10 ENCOUNTER — LAB VISIT (OUTPATIENT)
Dept: LAB | Facility: HOSPITAL | Age: 34
End: 2024-09-10
Attending: INTERNAL MEDICINE
Payer: COMMERCIAL

## 2024-09-10 DIAGNOSIS — N39.0 URINARY TRACT INFECTION WITHOUT HEMATURIA, SITE UNSPECIFIED: ICD-10-CM

## 2024-09-10 DIAGNOSIS — R10.9 ABDOMINAL PAIN, UNSPECIFIED ABDOMINAL LOCATION: Primary | ICD-10-CM

## 2024-09-10 LAB
BILIRUB UR QL STRIP: NEGATIVE
CLARITY UR: CLEAR
COLOR UR: COLORLESS
GLUCOSE UR QL STRIP: NEGATIVE
HGB UR QL STRIP: NEGATIVE
KETONES UR QL STRIP: NEGATIVE
LEUKOCYTE ESTERASE UR QL STRIP: NEGATIVE
NITRITE UR QL STRIP: NEGATIVE
PH UR STRIP: 7 [PH] (ref 5–8)
PROT UR QL STRIP: NEGATIVE
SP GR UR STRIP: 1.01 (ref 1–1.03)
URN SPEC COLLECT METH UR: ABNORMAL
UROBILINOGEN UR STRIP-ACNC: NEGATIVE EU/DL

## 2024-09-10 PROCEDURE — 87086 URINE CULTURE/COLONY COUNT: CPT | Performed by: INTERNAL MEDICINE

## 2024-09-10 PROCEDURE — 81003 URINALYSIS AUTO W/O SCOPE: CPT | Performed by: INTERNAL MEDICINE

## 2024-09-12 ENCOUNTER — HOSPITAL ENCOUNTER (OUTPATIENT)
Dept: RADIOLOGY | Facility: HOSPITAL | Age: 34
Discharge: HOME | End: 2024-09-12
Attending: INTERNAL MEDICINE
Payer: COMMERCIAL

## 2024-09-12 DIAGNOSIS — R10.9 ABDOMINAL PAIN, UNSPECIFIED ABDOMINAL LOCATION: ICD-10-CM

## 2024-09-12 LAB — BACTERIA UR CULT: NORMAL

## 2024-09-12 PROCEDURE — 74176 CT ABD & PELVIS W/O CONTRAST: CPT | Mod: TC

## 2024-09-12 PROCEDURE — 74176 CT ABD & PELVIS W/O CONTRAST: CPT | Mod: 26,,, | Performed by: RADIOLOGY

## 2025-01-14 ENCOUNTER — PATIENT MESSAGE (OUTPATIENT)
Dept: OBSTETRICS AND GYNECOLOGY | Facility: CLINIC | Age: 35
End: 2025-01-14
Payer: COMMERCIAL

## 2025-01-15 NOTE — TELEPHONE ENCOUNTER
Spoke to patient regarding recent spotting for 3 days. States last cycle dates was 12/28/24 and this is the first time spotting in a while. Advised patient per Dr. Barrios to monitor spotting. Scheduled annual and pap on March.

## 2025-01-20 ENCOUNTER — PATIENT MESSAGE (OUTPATIENT)
Dept: ADMINISTRATIVE | Facility: HOSPITAL | Age: 35
End: 2025-01-20
Payer: COMMERCIAL

## 2025-03-31 ENCOUNTER — OFFICE VISIT (OUTPATIENT)
Dept: OBSTETRICS AND GYNECOLOGY | Facility: CLINIC | Age: 35
End: 2025-03-31
Payer: COMMERCIAL

## 2025-03-31 VITALS
WEIGHT: 128.19 LBS | SYSTOLIC BLOOD PRESSURE: 128 MMHG | BODY MASS INDEX: 21.89 KG/M2 | DIASTOLIC BLOOD PRESSURE: 78 MMHG | HEIGHT: 64 IN

## 2025-03-31 DIAGNOSIS — Z87.410 HISTORY OF CERVICAL DYSPLASIA: ICD-10-CM

## 2025-03-31 DIAGNOSIS — Z01.419 ENCOUNTER FOR WELL WOMAN EXAM WITH ROUTINE GYNECOLOGICAL EXAM: Primary | ICD-10-CM

## 2025-03-31 DIAGNOSIS — N92.6 IRREGULAR MENSES: ICD-10-CM

## 2025-03-31 PROCEDURE — 3008F BODY MASS INDEX DOCD: CPT | Mod: CPTII,S$GLB,, | Performed by: OBSTETRICS & GYNECOLOGY

## 2025-03-31 PROCEDURE — 1159F MED LIST DOCD IN RCRD: CPT | Mod: CPTII,S$GLB,, | Performed by: OBSTETRICS & GYNECOLOGY

## 2025-03-31 PROCEDURE — 3078F DIAST BP <80 MM HG: CPT | Mod: CPTII,S$GLB,, | Performed by: OBSTETRICS & GYNECOLOGY

## 2025-03-31 PROCEDURE — 88175 CYTOPATH C/V AUTO FLUID REDO: CPT | Mod: TC | Performed by: OBSTETRICS & GYNECOLOGY

## 2025-03-31 PROCEDURE — 87624 HPV HI-RISK TYP POOLED RSLT: CPT | Performed by: OBSTETRICS & GYNECOLOGY

## 2025-03-31 PROCEDURE — 3074F SYST BP LT 130 MM HG: CPT | Mod: CPTII,S$GLB,, | Performed by: OBSTETRICS & GYNECOLOGY

## 2025-03-31 PROCEDURE — 99999 PR PBB SHADOW E&M-EST. PATIENT-LVL III: CPT | Mod: PBBFAC,,, | Performed by: OBSTETRICS & GYNECOLOGY

## 2025-03-31 PROCEDURE — 99395 PREV VISIT EST AGE 18-39: CPT | Mod: S$GLB,,, | Performed by: OBSTETRICS & GYNECOLOGY

## 2025-03-31 RX ORDER — NORETHINDRONE 5 MG/1
5 TABLET ORAL 3 TIMES DAILY
Qty: 45 TABLET | Refills: 0 | Status: SHIPPED | OUTPATIENT
Start: 2025-03-31 | End: 2025-04-16

## 2025-03-31 NOTE — PROGRESS NOTES
History & Physical  Gynecology      SUBJECTIVE:     Chief Complaint: well woman     History of Present Illness:    Chelsie Green is a 35 y.o. female  here for annual routine Pap and checkup. Patient's last menstrual period was 2025 (exact date)..  She has no unusual complaints.      Pt reports that she has been having slightly irregular menses since having COVID in January.   denies break through bleeding.   denies vaginal itching or irritation.  denies vaginal discharge.    She is sexually active with 1 partner  She uses vasectomy for contraception.    History of abnormal pap: Yes - OSVALDO 3 - - was pregnant at the time,  repeat pap and colpo after was OSVALDO 1.  Last pap LGSIL, with OSVALDO 1 on colpo in , AGC with colpo OSVALDO 1-  Last Pap: 3/28/2024, Atypical glandular cells.- Colpo OSVALDO 1  Last MMG: No  Last Colonoscopy:  No        Review of patient's allergies indicates:  No Known Allergies    Past Medical History:   Diagnosis Date    Abnormal Pap smear of cervix     Heart murmur     evaluated as a child, told not to be a problem    Scoliosis     didn' t need brace     Past Surgical History:   Procedure Laterality Date    breast augmentation      BREAST SURGERY      COSMETIC SURGERY      NOSE SURGERY      correct fracture of nose    TONSILLECTOMY, ADENOIDECTOMY       OB History          2    Para   2    Term   1       1    AB        Living   2         SAB        IAB        Ectopic        Multiple   0    Live Births   2               Family History   Problem Relation Name Age of Onset    Hypertension Father Rafa Almonte     Stroke Mother Courtney almonte     Breast cancer Neg Hx      Colon cancer Neg Hx      Ovarian cancer Neg Hx      Cancer Neg Hx      Cervical cancer Neg Hx      Uterine cancer Neg Hx       Social History     Tobacco Use    Smoking status: Never    Smokeless tobacco: Never   Substance Use Topics    Alcohol use: Not Currently     Alcohol/week: 2.0  standard drinks of alcohol     Types: 2 Glasses of wine per week     Comment: once a week    Drug use: No       Current Outpatient Medications   Medication Sig    adapalene-benzoyl-clindamycin (CABTREO) 0.15-3.1-1.2 % Gel Apply topically at bedtime    adapalene-benzoyl-clindamycin (CABTREO) 0.15-3.1-1.2 % Gel Apply to affected area at bedtime    clindamycin (CLEOCIN T) 1 % Swab Apply to afftected area 2 times per day    metroNIDAZOLE (METROGEL) 0.75 % gel Apply to affected area 2 times daily    spironolactone (ALDACTONE) 100 MG tablet Take 1 tablet by mouth daily    norethindrone (AYGESTIN) 5 mg Tab Take 1 tablet (5 mg total) by mouth 3 (three) times daily. for 15 days     No current facility-administered medications for this visit.         Review of Systems:  Review of Systems   Constitutional:  Negative for activity change, appetite change, chills, fatigue, fever and unexpected weight change.   Respiratory:  Negative for cough, shortness of breath and wheezing.    Cardiovascular:  Negative for chest pain and leg swelling.   Gastrointestinal:  Negative for abdominal pain, constipation, diarrhea, nausea and vomiting.   Endocrine: Negative for hair loss and hot flashes.   Genitourinary:  Negative for decreased libido, dyspareunia, dysuria, frequency, menstrual problem, pelvic pain, vaginal bleeding, vaginal discharge and vaginal pain.   Integumentary:  Negative for acne, hair changes, nipple discharge and breast skin changes.   Neurological:  Negative for headaches.   Psychiatric/Behavioral:  Negative for sleep disturbance.    Breast: Negative for mastodynia, nipple discharge and skin changes       OBJECTIVE:     Physical Exam:  Physical Exam  Constitutional:       Appearance: She is well-developed.   HENT:      Head: Normocephalic and atraumatic.   Eyes:      General: No scleral icterus.        Right eye: No discharge.         Left eye: No discharge.      Conjunctiva/sclera: Conjunctivae normal.   Pulmonary:       Effort: Pulmonary effort is normal.      Breath sounds: No stridor.   Chest:      Chest wall: No mass or tenderness.   Breasts:     Breasts are symmetrical.      Right: No inverted nipple, mass, nipple discharge, skin change or tenderness.      Left: No inverted nipple, mass, nipple discharge, skin change or tenderness.   Abdominal:      General: There is no distension.      Palpations: Abdomen is soft.      Tenderness: There is no abdominal tenderness.   Genitourinary:     Labia:         Right: No rash, tenderness, lesion or injury.         Left: No rash, tenderness, lesion or injury.       Vagina: Normal.      Cervix: No cervical motion tenderness, discharge or friability.      Adnexa:         Right: No mass, tenderness or fullness.          Left: No mass, tenderness or fullness.        Comments: Normal external genitalia.  Normal hair distribution.  Urethral meatus normal. No cervical lesions or masses.  No vaginal bleeding noted.  No adnexal or uterine tenderness.  No palpable adnexal masses.  Musculoskeletal:         General: Normal range of motion.   Skin:     General: Skin is warm and dry.   Neurological:      Mental Status: She is alert and oriented to person, place, and time.   Psychiatric:         Behavior: Behavior normal.         Thought Content: Thought content normal.         Judgment: Judgment normal.           ASSESSMENT:       ICD-10-CM ICD-9-CM    1. Encounter for well woman exam with routine gynecological exam  Z01.419 V72.31 Liquid-Based Pap Smear, Screening      2. Irregular menses  N92.6 626.4 norethindrone (AYGESTIN) 5 mg Tab               Plan:      Diagnoses and all orders for this visit:    Encounter for well woman exam with routine gynecological exam  -     Liquid-Based Pap Smear, Screening  -     HPV High Risk Genotypes, PCR  - Cotesting today.  Hx of cervical dysplasia.  CIN3 previously.  Improved to OSVALDO 1.  Last year AGC, Colpo OSVALDO 1  - MMG and Cscope not indicated at this  time    History of cervical dysplasia  -     Liquid-Based Pap Smear, Screening  -     HPV High Risk Genotypes, PCR      No orders of the defined types were placed in this encounter.      No follow-ups on file.     Counseling time: 15 minutes    Shannon Barrios

## 2025-05-21 ENCOUNTER — PATIENT MESSAGE (OUTPATIENT)
Dept: INTERNAL MEDICINE | Facility: CLINIC | Age: 35
End: 2025-05-21
Payer: COMMERCIAL

## 2025-05-23 ENCOUNTER — OFFICE VISIT (OUTPATIENT)
Dept: INTERNAL MEDICINE | Facility: CLINIC | Age: 35
End: 2025-05-23
Payer: COMMERCIAL

## 2025-05-23 VITALS
OXYGEN SATURATION: 100 % | HEIGHT: 64 IN | WEIGHT: 130.94 LBS | BODY MASS INDEX: 22.35 KG/M2 | HEART RATE: 68 BPM | SYSTOLIC BLOOD PRESSURE: 118 MMHG | DIASTOLIC BLOOD PRESSURE: 72 MMHG

## 2025-05-23 DIAGNOSIS — N63.42 SUBAREOLAR MASS OF LEFT BREAST: Primary | ICD-10-CM

## 2025-05-23 DIAGNOSIS — J30.9 ALLERGIC RHINITIS, UNSPECIFIED SEASONALITY, UNSPECIFIED TRIGGER: ICD-10-CM

## 2025-05-23 PROCEDURE — 99999 PR PBB SHADOW E&M-EST. PATIENT-LVL IV: CPT | Mod: PBBFAC,,,

## 2025-05-23 NOTE — PROGRESS NOTES
INTERNAL MEDICINE URGENT CARE NOTE    CHIEF COMPLAINT     Chief Complaint   Patient presents with    Breast Mass     Left breast lump 5 days     HPI     Chelsie Green is a 35 y.o. female who presents for an urgent care visit today.    BREAST CONCERNS:  She reports a left breast lump near nipple area with mild discomfort but denies acute pain. She denies associated symptoms including fever, chills, nipple discharge, skin changes, pain, trauma, weight loss or unusual night sweats. She denies breast tenderness associated with menstrual cycles. Last menstrual period was around April 28th.    MEDICAL HISTORY:  History of mastitis during childhood treated with antibiotics. History of breast augmentation with silicone implants placed submuscularly.    OTHER SYMPTOMS:  She reports post-nasal drip and fatigue, which she attributes to seasonal allergies and having young children. She denies night sweats or nasal congestion.    ROS:  General: -fever, -chills, +fatigue, -weight gain, -weight loss  Eyes: -vision changes, -redness, -discharge  ENT: -ear pain, -nasal congestion, -sore throat, +post nasal drip  Cardiovascular: -chest pain, -palpitations, -lower extremity edema  Respiratory: -cough, -shortness of breath  Gastrointestinal: -abdominal pain, -nausea, -vomiting, -diarrhea, -constipation, -blood in stool  Genitourinary: -dysuria, -hematuria, -frequency  Musculoskeletal: -joint pain, -muscle pain  Skin: -rash, -lesion  Neurological: -headache, -dizziness, -numbness, -tingling  Psychiatric: -anxiety, -depression, -sleep difficulty  Breasts: +breast lump, +breast tenderness      Past Medical History:  Past Medical History:   Diagnosis Date    Abnormal Pap smear of cervix 2012    Heart murmur     evaluated as a child, told not to be a problem    Scoliosis     didn' t need brace     Home Medications:  Prior to Admission medications    Medication Sig Start Date End Date Taking? Authorizing Provider    adapalene-benzoyl-clindamycin (CABTREO) 0.15-3.1-1.2 % Gel Apply topically at bedtime 11/13/24      adapalene-benzoyl-clindamycin (CABTREO) 0.15-3.1-1.2 % Gel Apply to affected area at bedtime 3/19/25      clindamycin (CLEOCIN T) 1 % Swab Apply to afftected area 2 times per day 3/19/25      metroNIDAZOLE (METROGEL) 0.75 % gel Apply to affected area 2 times daily 3/19/25      norethindrone (AYGESTIN) 5 mg Tab Take 1 tablet (5 mg total) by mouth 3 (three) times daily. for 15 days 3/31/25 4/16/25  Shannon Barrios MD   spironolactone (ALDACTONE) 100 MG tablet Take 1 tablet by mouth daily 3/19/25        PHYSICAL EXAM     General: No acute distress. Well-developed. Well-nourished.  Eyes: EOMI. Sclerae anicteric.  HENT: Normocephalic. Atraumatic. Nares patent. Moist oral mucosa.  Cardiovascular: Regular rate. Regular rhythm. No murmurs. No rubs. No gallops. Normal S1, S2.  Respiratory: Normal respiratory effort. Clear to auscultation bilaterally. No rales. No rhonchi. No wheezing.  Breast: Small palpable tissue mass at 4 o'clock position of edge of areola (lateral) of left breast, without erythema, skin changes, nipple discharge, nipple retraction or signs of infection. No palpable axillary or supraclavicular lymphadenopathy.  Musculoskeletal: No  obvious deformity.  Extremities: No lower extremity edema.  Neurological: Alert & oriented x3. No slurred speech. Normal gait.  Psychiatric: Normal mood. Normal affect. Good insight. Good judgment.  Skin: Warm. Dry. No rash.  Lymphatics: No swollen lymph nodes.      Head:      Right side of head: No submandibular or posterior auricular adenopathy.      Left side of head: No submandibular or posterior auricular adenopathy.      Cervical: No cervical adenopathy.      Upper Body:      Right upper body: No supraclavicular, axillary or pectoral adenopathy.      Left upper body: No supraclavicular, axillary or pectoral adenopathy.      Lower Body: No right inguinal adenopathy. No  "left inguinal adenopathy.       /72 (BP Location: Left arm, Patient Position: Sitting)   Pulse 68   Ht 5' 4" (1.626 m)   Wt 59.4 kg (130 lb 15.3 oz)   SpO2 100%   BMI 22.48 kg/m²   ASSESSMENT/PLAN     LUMP IN LEFT BREAST:  - Chelsie reports a lump on the left breast near the nipple area with some discomfort but no pain.  - No associated symptoms such as fever, chills, discharge, streaking, redness, or night sweats.  - Physical exam revealed a small lump, possibly fibrous tissue or ductal change, with no swollen lymph nodes.  - Chelsie has history of previous breast cyst treated with antibiotics.  - Ordered diagnostic mammogram of left breast as soon as possible for further evaluation.    ALLERGIC RHINITIS  - Chelsie reports current post nasal drip   - Symptomatic treatment, increase hydration    I spent a total of 35 minutes on the day of the visit.This includes face to face time and non-face to face time preparing to see the patient (eg, review of tests), obtaining and/or reviewing separately obtained history, documenting clinical information in the electronic or other health record, independently interpreting results and communicating results to the patient/family/caregiver, or care coordinator.     Patient education provided from [employer]. Patient was counseled on when and how to seek emergent care.   This note was generated with the assistance of ambient listening technology. Verbal consent was obtained by the patient and accompanying visitor(s) for the recording of patient appointment to facilitate this note. I attest to having reviewed and edited the generated note for accuracy, though some syntax or spelling errors may persist. Please contact the author of this note for any clarification.       Vielka KNIGHT, APRN, FNP-c   Department of Internal Medicine - [facility]  8:05 AM    "

## 2025-05-28 ENCOUNTER — HOSPITAL ENCOUNTER (OUTPATIENT)
Dept: RADIOLOGY | Facility: HOSPITAL | Age: 35
Discharge: HOME | End: 2025-05-28
Payer: COMMERCIAL

## 2025-05-28 DIAGNOSIS — N63.42 SUBAREOLAR MASS OF LEFT BREAST: ICD-10-CM

## 2025-05-28 PROCEDURE — 77066 DX MAMMO INCL CAD BI: CPT | Mod: TC

## 2025-05-28 PROCEDURE — 77062 BREAST TOMOSYNTHESIS BI: CPT | Mod: 26,,, | Performed by: RADIOLOGY

## 2025-05-28 PROCEDURE — 76642 ULTRASOUND BREAST LIMITED: CPT | Mod: 26,LT,, | Performed by: RADIOLOGY

## 2025-05-28 PROCEDURE — 77066 DX MAMMO INCL CAD BI: CPT | Mod: 26,,, | Performed by: RADIOLOGY

## 2025-05-28 PROCEDURE — 76642 ULTRASOUND BREAST LIMITED: CPT | Mod: TC,LT

## 2025-05-29 ENCOUNTER — RESULTS FOLLOW-UP (OUTPATIENT)
Dept: INTERNAL MEDICINE | Facility: CLINIC | Age: 35
End: 2025-05-29

## 2025-06-10 ENCOUNTER — TELEPHONE (OUTPATIENT)
Dept: INTERNAL MEDICINE | Facility: CLINIC | Age: 35
End: 2025-06-10
Payer: COMMERCIAL

## 2025-06-11 ENCOUNTER — OFFICE VISIT (OUTPATIENT)
Dept: INTERNAL MEDICINE | Facility: CLINIC | Age: 35
End: 2025-06-11
Payer: COMMERCIAL

## 2025-06-11 ENCOUNTER — LAB VISIT (OUTPATIENT)
Dept: LAB | Facility: HOSPITAL | Age: 35
End: 2025-06-11
Payer: COMMERCIAL

## 2025-06-11 VITALS
BODY MASS INDEX: 22.43 KG/M2 | HEART RATE: 80 BPM | WEIGHT: 131.38 LBS | HEIGHT: 64 IN | DIASTOLIC BLOOD PRESSURE: 66 MMHG | SYSTOLIC BLOOD PRESSURE: 122 MMHG

## 2025-06-11 DIAGNOSIS — Z00.00 ENCOUNTER FOR PREVENTIVE HEALTH EXAMINATION: Primary | ICD-10-CM

## 2025-06-11 DIAGNOSIS — R41.840 LACK OF CONCENTRATION: ICD-10-CM

## 2025-06-11 DIAGNOSIS — Z00.00 ENCOUNTER FOR PREVENTIVE HEALTH EXAMINATION: ICD-10-CM

## 2025-06-11 LAB
ABSOLUTE EOSINOPHIL (OHS): 0.11 K/UL
ABSOLUTE MONOCYTE (OHS): 0.42 K/UL (ref 0.3–1)
ABSOLUTE NEUTROPHIL COUNT (OHS): 4.95 K/UL (ref 1.8–7.7)
ALBUMIN SERPL BCP-MCNC: 4.7 G/DL (ref 3.5–5.2)
ALP SERPL-CCNC: 37 UNIT/L (ref 40–150)
ALT SERPL W/O P-5'-P-CCNC: 13 UNIT/L (ref 10–44)
ANION GAP (OHS): 12 MMOL/L (ref 8–16)
AST SERPL-CCNC: 16 UNIT/L (ref 11–45)
BASOPHILS # BLD AUTO: 0.02 K/UL
BASOPHILS NFR BLD AUTO: 0.3 %
BILIRUB SERPL-MCNC: 0.6 MG/DL (ref 0.1–1)
BUN SERPL-MCNC: 11 MG/DL (ref 6–20)
CALCIUM SERPL-MCNC: 9.4 MG/DL (ref 8.7–10.5)
CHLORIDE SERPL-SCNC: 104 MMOL/L (ref 95–110)
CO2 SERPL-SCNC: 23 MMOL/L (ref 23–29)
CREAT SERPL-MCNC: 0.7 MG/DL (ref 0.5–1.4)
CRP SERPL-MCNC: 0.5 MG/L
ERYTHROCYTE [DISTWIDTH] IN BLOOD BY AUTOMATED COUNT: 12.1 % (ref 11.5–14.5)
ERYTHROCYTE [SEDIMENTATION RATE] IN BLOOD BY PHOTOMETRIC METHOD: 2 MM/HR
GFR SERPLBLD CREATININE-BSD FMLA CKD-EPI: >60 ML/MIN/1.73/M2
GLUCOSE SERPL-MCNC: 98 MG/DL (ref 70–110)
HCT VFR BLD AUTO: 39.5 % (ref 37–48.5)
HGB BLD-MCNC: 13 GM/DL (ref 12–16)
IMM GRANULOCYTES # BLD AUTO: 0.02 K/UL (ref 0–0.04)
IMM GRANULOCYTES NFR BLD AUTO: 0.3 % (ref 0–0.5)
LYMPHOCYTES # BLD AUTO: 2.36 K/UL (ref 1–4.8)
MCH RBC QN AUTO: 28.4 PG (ref 27–31)
MCHC RBC AUTO-ENTMCNC: 32.9 G/DL (ref 32–36)
MCV RBC AUTO: 86 FL (ref 82–98)
NUCLEATED RBC (/100WBC) (OHS): 0 /100 WBC
PLATELET # BLD AUTO: 219 K/UL (ref 150–450)
PMV BLD AUTO: 10.4 FL (ref 9.2–12.9)
POTASSIUM SERPL-SCNC: 3.5 MMOL/L (ref 3.5–5.1)
PROT SERPL-MCNC: 7.5 GM/DL (ref 6–8.4)
RBC # BLD AUTO: 4.58 M/UL (ref 4–5.4)
RELATIVE EOSINOPHIL (OHS): 1.4 %
RELATIVE LYMPHOCYTE (OHS): 29.9 % (ref 18–48)
RELATIVE MONOCYTE (OHS): 5.3 % (ref 4–15)
RELATIVE NEUTROPHIL (OHS): 62.8 % (ref 38–73)
SODIUM SERPL-SCNC: 139 MMOL/L (ref 136–145)
TSH SERPL-ACNC: 2.31 UIU/ML (ref 0.4–4)
WBC # BLD AUTO: 7.88 K/UL (ref 3.9–12.7)

## 2025-06-11 PROCEDURE — 3008F BODY MASS INDEX DOCD: CPT | Mod: CPTII,S$GLB,, | Performed by: PHYSICIAN ASSISTANT

## 2025-06-11 PROCEDURE — 85652 RBC SED RATE AUTOMATED: CPT

## 2025-06-11 PROCEDURE — 99395 PREV VISIT EST AGE 18-39: CPT | Mod: S$GLB,,, | Performed by: PHYSICIAN ASSISTANT

## 2025-06-11 PROCEDURE — 82040 ASSAY OF SERUM ALBUMIN: CPT

## 2025-06-11 PROCEDURE — 86140 C-REACTIVE PROTEIN: CPT

## 2025-06-11 PROCEDURE — 36415 COLL VENOUS BLD VENIPUNCTURE: CPT

## 2025-06-11 PROCEDURE — 85025 COMPLETE CBC W/AUTO DIFF WBC: CPT

## 2025-06-11 PROCEDURE — 1159F MED LIST DOCD IN RCRD: CPT | Mod: CPTII,S$GLB,, | Performed by: PHYSICIAN ASSISTANT

## 2025-06-11 PROCEDURE — 84443 ASSAY THYROID STIM HORMONE: CPT

## 2025-06-11 PROCEDURE — 1160F RVW MEDS BY RX/DR IN RCRD: CPT | Mod: CPTII,S$GLB,, | Performed by: PHYSICIAN ASSISTANT

## 2025-06-11 PROCEDURE — 3078F DIAST BP <80 MM HG: CPT | Mod: CPTII,S$GLB,, | Performed by: PHYSICIAN ASSISTANT

## 2025-06-11 PROCEDURE — 99999 PR PBB SHADOW E&M-EST. PATIENT-LVL IV: CPT | Mod: PBBFAC,,, | Performed by: PHYSICIAN ASSISTANT

## 2025-06-11 PROCEDURE — 3074F SYST BP LT 130 MM HG: CPT | Mod: CPTII,S$GLB,, | Performed by: PHYSICIAN ASSISTANT

## 2025-06-11 RX ORDER — BUPROPION HYDROCHLORIDE 150 MG/1
150 TABLET ORAL DAILY
Qty: 30 TABLET | Refills: 2 | Status: SHIPPED | OUTPATIENT
Start: 2025-06-11 | End: 2026-06-11

## 2025-06-11 NOTE — PATIENT INSTRUCTIONS
Please call psychiatry/psychology to schedule an appointment at [phone] as we cannot schedule this appointment for you.     Nigel Holguin,     If you are due for any health screening(s) below please notify me so we can arrange them to be ordered and scheduled. Most healthy patients at your age complete them, but you are free to accept or refuse.     If you can't do it, I'll definitely understand. If you can, I'd certainly appreciate it!    All of your core healthy metrics are met.

## 2025-06-12 ENCOUNTER — RESULTS FOLLOW-UP (OUTPATIENT)
Dept: INTERNAL MEDICINE | Facility: CLINIC | Age: 35
End: 2025-06-12

## 2025-06-12 ENCOUNTER — PATIENT MESSAGE (OUTPATIENT)
Dept: INTERNAL MEDICINE | Facility: CLINIC | Age: 35
End: 2025-06-12
Payer: COMMERCIAL

## 2025-06-12 NOTE — PROGRESS NOTES
Subjective:       Patient ID: Chelsie Green is a 35 y.o. female.        Chief Complaint: No chief complaint on file.    Chelsie Green is an established patient of Kalee Olvera MD here today for annual exam.    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    She notes issues with focus, time blindness, and procrastination throughout her life.  Her father and two brothers have ADHD.  She wonders if she does as well.  She thinks one of her children may have ADHD.  Notes more difficulty of late as pulled in many directions with kids, work, home, etc.  Gets overwhelmed by tasks at times.    BMI 22    Trying to exercise but not doing a lot.  Eats fairly healthy.             Review of Systems   Constitutional:  Negative for chills, diaphoresis, fatigue and fever.   HENT:  Negative for congestion and sore throat.    Eyes:  Negative for visual disturbance.   Respiratory:  Negative for cough, chest tightness and shortness of breath.    Cardiovascular:  Negative for chest pain, palpitations and leg swelling.   Gastrointestinal:  Negative for abdominal pain, blood in stool, constipation, diarrhea, nausea and vomiting.   Genitourinary:  Negative for dysuria, frequency, hematuria and urgency.   Musculoskeletal:  Negative for arthralgias and back pain.   Skin:  Negative for rash.   Neurological:  Negative for dizziness, syncope, weakness and headaches.   Psychiatric/Behavioral:  Positive for decreased concentration. Negative for dysphoric mood and sleep disturbance. The patient is not nervous/anxious.        Objective:      Physical Exam  Vitals and nursing note reviewed.   Constitutional:       Appearance: Normal appearance. She is well-developed.   HENT:      Head: Normocephalic.      Right Ear: Tympanic membrane and external ear normal.      Left Ear: Tympanic membrane and external ear normal.      Nose: No mucosal edema or rhinorrhea.      Mouth/Throat:      Pharynx: Oropharynx is clear.   Eyes:      " Pupils: Pupils are equal, round, and reactive to light.   Cardiovascular:      Rate and Rhythm: Normal rate and regular rhythm.      Heart sounds: Normal heart sounds. No murmur heard.     No friction rub. No gallop.   Pulmonary:      Effort: Pulmonary effort is normal. No respiratory distress.      Breath sounds: Normal breath sounds.   Abdominal:      Palpations: Abdomen is soft.      Tenderness: There is no abdominal tenderness.   Skin:     General: Skin is warm and dry.   Neurological:      Mental Status: She is alert.         Assessment:       1. Encounter for preventive health examination    2. Lack of concentration        Plan:       Diagnoses and all orders for this visit:    Encounter for preventive health examination  -     CBC Auto Differential; Future  -     Comprehensive Metabolic Panel; Future  -     TSH; Future  -     Sedimentation rate; Future  -     C-Reactive Protein (In-House); Future    Lack of concentration  -     Ambulatory referral/consult to Psychiatry; Future  -     buPROPion (WELLBUTRIN XL) 150 MG TB24 tablet; Take 1 tablet (150 mg total) by mouth once daily.    Schedule lab work  Refer for testing for ADHD - [facility] to get testing done  Trial of wellbutrin as above  F/u 4-6 weeks    Pt has been given instructions populated from patient instructions database and has verbalized understanding of the after visit summary and information contained wherein.    Follow up with a primary care provider. May go to ER for acute shortness of breath, lightheadedness, fever, or any other emergent complaints or changes in condition.    "This note will be shared with the patient"    No future appointments.              "

## 2025-09-04 ENCOUNTER — PATIENT MESSAGE (OUTPATIENT)
Dept: INTERNAL MEDICINE | Facility: CLINIC | Age: 35
End: 2025-09-04
Payer: COMMERCIAL